# Patient Record
Sex: MALE | Race: WHITE | Employment: OTHER | URBAN - METROPOLITAN AREA
[De-identification: names, ages, dates, MRNs, and addresses within clinical notes are randomized per-mention and may not be internally consistent; named-entity substitution may affect disease eponyms.]

---

## 2018-05-23 ENCOUNTER — HOSPITAL ENCOUNTER (OUTPATIENT)
Dept: GENERAL RADIOLOGY | Age: 83
Discharge: HOME | End: 2018-05-25
Payer: MEDICARE

## 2018-05-23 ENCOUNTER — HOSPITAL ENCOUNTER (OUTPATIENT)
Age: 83
Discharge: HOME | End: 2018-05-25
Payer: MEDICARE

## 2018-05-23 DIAGNOSIS — M25.475: ICD-10-CM

## 2018-05-23 PROCEDURE — 73630 X-RAY EXAM OF FOOT: CPT

## 2018-06-07 ENCOUNTER — HOSPITAL ENCOUNTER (OUTPATIENT)
Dept: MRI IMAGING | Age: 83
Discharge: HOME | End: 2018-06-09
Payer: MEDICARE

## 2018-06-07 DIAGNOSIS — M25.475: ICD-10-CM

## 2018-06-07 DIAGNOSIS — M19.072 PRIMARY LOCALIZED OSTEOARTHRITIS OF LEFT ANKLE: ICD-10-CM

## 2018-06-07 PROCEDURE — 73721 MRI JNT OF LWR EXTRE W/O DYE: CPT

## 2021-01-01 ENCOUNTER — HOSPITAL ENCOUNTER (OUTPATIENT)
Dept: INFUSION THERAPY | Age: 86
Discharge: HOME | End: 2021-11-16
Payer: MEDICARE

## 2021-01-01 ENCOUNTER — HOSPITAL ENCOUNTER (OUTPATIENT)
Dept: INFUSION THERAPY | Age: 86
End: 2021-01-01

## 2021-01-01 ENCOUNTER — TELEPHONE (OUTPATIENT)
Dept: INFUSION THERAPY | Age: 86
End: 2021-01-01

## 2021-01-01 ENCOUNTER — APPOINTMENT (OUTPATIENT)
Dept: ULTRASOUND IMAGING | Age: 86
DRG: 291 | End: 2021-01-01
Payer: MEDICARE

## 2021-01-01 ENCOUNTER — OFFICE VISIT (OUTPATIENT)
Dept: CARDIOLOGY CLINIC | Age: 86
End: 2021-01-01
Payer: MEDICARE

## 2021-01-01 ENCOUNTER — OFFICE VISIT (OUTPATIENT)
Dept: ONCOLOGY | Age: 86
DRG: 543 | End: 2021-01-01
Payer: MEDICARE

## 2021-01-01 ENCOUNTER — TELEPHONE (OUTPATIENT)
Dept: PULMONOLOGY | Age: 86
End: 2021-01-01

## 2021-01-01 ENCOUNTER — HOSPITAL ENCOUNTER (OUTPATIENT)
Dept: RADIATION ONCOLOGY | Age: 86
Discharge: HOME | End: 2021-10-13
Attending: RADIOLOGY
Payer: MEDICARE

## 2021-01-01 ENCOUNTER — HOSPITAL ENCOUNTER (OUTPATIENT)
Dept: CT IMAGING | Age: 86
Discharge: HOME | End: 2021-07-03
Payer: MEDICARE

## 2021-01-01 ENCOUNTER — IMMUNIZATION (OUTPATIENT)
Dept: PRIMARY CARE CLINIC | Age: 86
End: 2021-01-01
Payer: MEDICARE

## 2021-01-01 ENCOUNTER — APPOINTMENT (OUTPATIENT)
Dept: CT IMAGING | Age: 86
DRG: 543 | End: 2021-01-01
Payer: MEDICARE

## 2021-01-01 ENCOUNTER — HOSPITAL ENCOUNTER (OUTPATIENT)
Dept: RADIATION ONCOLOGY | Age: 86
Discharge: HOME | End: 2021-12-10
Attending: RADIOLOGY
Payer: MEDICARE

## 2021-01-01 ENCOUNTER — HOSPITAL ENCOUNTER (OUTPATIENT)
Dept: RADIATION ONCOLOGY | Age: 86
Discharge: HOME | End: 2021-11-18
Attending: RADIOLOGY
Payer: MEDICARE

## 2021-01-01 ENCOUNTER — HOSPITAL ENCOUNTER (OUTPATIENT)
Dept: RADIATION ONCOLOGY | Age: 86
Discharge: HOME | End: 2021-11-16
Attending: RADIOLOGY
Payer: MEDICARE

## 2021-01-01 ENCOUNTER — HOSPITAL ENCOUNTER (OUTPATIENT)
Dept: INFUSION THERAPY | Age: 86
Discharge: HOME | End: 2021-12-15
Payer: MEDICARE

## 2021-01-01 ENCOUNTER — APPOINTMENT (OUTPATIENT)
Dept: RADIATION ONCOLOGY | Age: 86
End: 2021-01-01
Attending: RADIOLOGY
Payer: MEDICARE

## 2021-01-01 ENCOUNTER — TELEPHONE (OUTPATIENT)
Dept: CASE MANAGEMENT | Age: 86
End: 2021-01-01

## 2021-01-01 ENCOUNTER — ANESTHESIA (OUTPATIENT)
Dept: ENDOSCOPY | Age: 86
End: 2021-01-01
Payer: MEDICARE

## 2021-01-01 ENCOUNTER — HOSPITAL ENCOUNTER (OUTPATIENT)
Dept: RADIATION ONCOLOGY | Age: 86
Discharge: HOME | End: 2021-12-08
Attending: RADIOLOGY
Payer: MEDICARE

## 2021-01-01 ENCOUNTER — HOSPITAL ENCOUNTER (OUTPATIENT)
Dept: INFUSION THERAPY | Age: 86
Discharge: HOME | End: 2021-11-23

## 2021-01-01 ENCOUNTER — TELEPHONE (OUTPATIENT)
Dept: VASCULAR SURGERY | Age: 86
End: 2021-01-01

## 2021-01-01 ENCOUNTER — ANESTHESIA (OUTPATIENT)
Dept: INPATIENT UNIT | Age: 86
End: 2021-01-01

## 2021-01-01 ENCOUNTER — HOSPITAL ENCOUNTER (OUTPATIENT)
Dept: RADIATION ONCOLOGY | Age: 86
Discharge: HOME | End: 2021-11-10
Attending: RADIOLOGY
Payer: MEDICARE

## 2021-01-01 ENCOUNTER — CARE COORDINATION (OUTPATIENT)
Dept: CASE MANAGEMENT | Age: 86
End: 2021-01-01

## 2021-01-01 ENCOUNTER — HOSPITAL ENCOUNTER (OUTPATIENT)
Dept: NUCLEAR MEDICINE | Age: 86
Discharge: HOME | End: 2021-09-25
Payer: MEDICARE

## 2021-01-01 ENCOUNTER — OFFICE VISIT (OUTPATIENT)
Dept: ONCOLOGY | Age: 86
End: 2021-01-01
Payer: MEDICARE

## 2021-01-01 ENCOUNTER — APPOINTMENT (OUTPATIENT)
Dept: GENERAL RADIOLOGY | Age: 86
End: 2021-01-01
Payer: MEDICARE

## 2021-01-01 ENCOUNTER — APPOINTMENT (OUTPATIENT)
Dept: GENERAL RADIOLOGY | Age: 86
End: 2021-01-01
Attending: INTERNAL MEDICINE
Payer: MEDICARE

## 2021-01-01 ENCOUNTER — HOSPITAL ENCOUNTER (OUTPATIENT)
Dept: RADIATION ONCOLOGY | Age: 86
Discharge: HOME | End: 2021-12-02
Attending: RADIOLOGY
Payer: MEDICARE

## 2021-01-01 ENCOUNTER — HOSPITAL ENCOUNTER (OUTPATIENT)
Dept: RADIATION ONCOLOGY | Age: 86
Discharge: HOME | End: 2021-11-15
Attending: RADIOLOGY
Payer: MEDICARE

## 2021-01-01 ENCOUNTER — HOSPITAL ENCOUNTER (OUTPATIENT)
Dept: RADIATION ONCOLOGY | Age: 86
Discharge: HOME | End: 2021-11-30
Attending: RADIOLOGY
Payer: MEDICARE

## 2021-01-01 ENCOUNTER — HOSPITAL ENCOUNTER (OUTPATIENT)
Dept: RADIATION ONCOLOGY | Age: 86
Discharge: HOME | End: 2021-11-09
Attending: RADIOLOGY
Payer: MEDICARE

## 2021-01-01 ENCOUNTER — ANESTHESIA EVENT (OUTPATIENT)
Dept: INPATIENT UNIT | Age: 86
End: 2021-01-01

## 2021-01-01 ENCOUNTER — HOSPITAL ENCOUNTER (OUTPATIENT)
Dept: RADIATION ONCOLOGY | Age: 86
Discharge: HOME | End: 2021-11-11
Attending: RADIOLOGY
Payer: MEDICARE

## 2021-01-01 ENCOUNTER — HOSPITAL ENCOUNTER (OUTPATIENT)
Dept: INFUSION THERAPY | Age: 86
Discharge: HOME | End: 2021-11-09
Payer: MEDICARE

## 2021-01-01 ENCOUNTER — HOSPITAL ENCOUNTER (OUTPATIENT)
Dept: RADIATION ONCOLOGY | Age: 86
Discharge: HOME | End: 2021-11-19
Attending: RADIOLOGY
Payer: MEDICARE

## 2021-01-01 ENCOUNTER — HOSPITAL ENCOUNTER (OUTPATIENT)
Dept: RADIATION ONCOLOGY | Age: 86
Discharge: HOME | End: 2021-12-21
Attending: RADIOLOGY
Payer: MEDICARE

## 2021-01-01 ENCOUNTER — HOSPITAL ENCOUNTER (OUTPATIENT)
Dept: RADIATION ONCOLOGY | Age: 86
Discharge: HOME | End: 2021-12-14
Attending: RADIOLOGY
Payer: MEDICARE

## 2021-01-01 ENCOUNTER — HOSPITAL ENCOUNTER (OUTPATIENT)
Dept: INFUSION THERAPY | Age: 86
Discharge: HOME | End: 2021-12-13
Payer: MEDICARE

## 2021-01-01 ENCOUNTER — HOSPITAL ENCOUNTER (OUTPATIENT)
Dept: RADIATION ONCOLOGY | Age: 86
Discharge: HOME | End: 2021-11-12
Attending: RADIOLOGY
Payer: MEDICARE

## 2021-01-01 ENCOUNTER — ANESTHESIA EVENT (OUTPATIENT)
Dept: ENDOSCOPY | Age: 86
End: 2021-01-01
Payer: MEDICARE

## 2021-01-01 ENCOUNTER — HOSPITAL ENCOUNTER (OUTPATIENT)
Dept: PET IMAGING | Age: 86
Discharge: HOME | End: 2021-09-22
Payer: MEDICARE

## 2021-01-01 ENCOUNTER — HOSPITAL ENCOUNTER (OUTPATIENT)
Dept: RADIATION ONCOLOGY | Age: 86
Discharge: HOME | End: 2021-12-01
Attending: RADIOLOGY
Payer: MEDICARE

## 2021-01-01 ENCOUNTER — APPOINTMENT (OUTPATIENT)
Dept: NUCLEAR MEDICINE | Age: 86
DRG: 291 | End: 2021-01-01
Payer: MEDICARE

## 2021-01-01 ENCOUNTER — HOSPITAL ENCOUNTER (OUTPATIENT)
Dept: RADIATION ONCOLOGY | Age: 86
Discharge: HOME | End: 2021-12-09
Attending: RADIOLOGY
Payer: MEDICARE

## 2021-01-01 ENCOUNTER — HOSPITAL ENCOUNTER (EMERGENCY)
Age: 86
Discharge: HOME | End: 2021-11-20
Payer: MEDICARE

## 2021-01-01 ENCOUNTER — HOSPITAL ENCOUNTER (OUTPATIENT)
Dept: RADIATION ONCOLOGY | Age: 86
Discharge: HOME | End: 2021-11-22
Attending: RADIOLOGY
Payer: MEDICARE

## 2021-01-01 ENCOUNTER — HOSPITAL ENCOUNTER (OUTPATIENT)
Dept: INPATIENT UNIT | Age: 86
Setting detail: OUTPATIENT SURGERY
Discharge: HOME | End: 2021-07-27
Payer: MEDICARE

## 2021-01-01 ENCOUNTER — HOSPITAL ENCOUNTER (OUTPATIENT)
Dept: INFUSION THERAPY | Age: 86
Discharge: HOME | End: 2021-12-07
Payer: MEDICARE

## 2021-01-01 ENCOUNTER — APPOINTMENT (OUTPATIENT)
Dept: CT IMAGING | Age: 86
DRG: 291 | End: 2021-01-01
Payer: MEDICARE

## 2021-01-01 ENCOUNTER — HOSPITAL ENCOUNTER (OUTPATIENT)
Dept: INFUSION THERAPY | Age: 86
Discharge: HOME | End: 2021-11-15
Payer: MEDICARE

## 2021-01-01 ENCOUNTER — HOSPITAL ENCOUNTER (OUTPATIENT)
Dept: RADIATION ONCOLOGY | Age: 86
Discharge: HOME | End: 2021-11-17
Attending: RADIOLOGY
Payer: MEDICARE

## 2021-01-01 ENCOUNTER — HOSPITAL ENCOUNTER (OUTPATIENT)
Dept: INFUSION THERAPY | Age: 86
Discharge: HOME | End: 2021-12-14
Payer: MEDICARE

## 2021-01-01 ENCOUNTER — TELEPHONE (OUTPATIENT)
Dept: CARDIOLOGY CLINIC | Age: 86
End: 2021-01-01

## 2021-01-01 ENCOUNTER — HOSPITAL ENCOUNTER (OUTPATIENT)
Dept: RADIATION ONCOLOGY | Age: 86
Discharge: HOME | End: 2021-12-03
Attending: RADIOLOGY
Payer: MEDICARE

## 2021-01-01 ENCOUNTER — HOSPITAL ENCOUNTER (OUTPATIENT)
Dept: GENERAL RADIOLOGY | Age: 86
Discharge: HOME | End: 2021-07-03
Payer: MEDICARE

## 2021-01-01 ENCOUNTER — HOSPITAL ENCOUNTER (OUTPATIENT)
Dept: INFUSION THERAPY | Age: 86
Discharge: HOME | End: 2021-11-08
Payer: MEDICARE

## 2021-01-01 ENCOUNTER — TELEPHONE (OUTPATIENT)
Dept: ONCOLOGY | Age: 86
End: 2021-01-01

## 2021-01-01 ENCOUNTER — HOSPITAL ENCOUNTER (OUTPATIENT)
Dept: RADIATION ONCOLOGY | Age: 86
Discharge: HOME | End: 2021-12-13
Attending: RADIOLOGY
Payer: MEDICARE

## 2021-01-01 ENCOUNTER — APPOINTMENT (OUTPATIENT)
Dept: GENERAL RADIOLOGY | Age: 86
DRG: 543 | End: 2021-01-01
Payer: MEDICARE

## 2021-01-01 ENCOUNTER — HOSPITAL ENCOUNTER (OUTPATIENT)
Dept: RADIATION ONCOLOGY | Age: 86
Discharge: HOME | End: 2021-11-21
Attending: RADIOLOGY
Payer: MEDICARE

## 2021-01-01 ENCOUNTER — HOSPITAL ENCOUNTER (OUTPATIENT)
Dept: RADIATION ONCOLOGY | Age: 86
Discharge: HOME | End: 2021-12-16
Attending: RADIOLOGY
Payer: MEDICARE

## 2021-01-01 ENCOUNTER — HOSPITAL ENCOUNTER (OUTPATIENT)
Dept: RADIATION ONCOLOGY | Age: 86
Discharge: HOME | End: 2021-10-21
Attending: RADIOLOGY
Payer: MEDICARE

## 2021-01-01 ENCOUNTER — HOSPITAL ENCOUNTER (OUTPATIENT)
Age: 86
Setting detail: OUTPATIENT SURGERY
Discharge: HOME | End: 2021-09-01
Attending: INTERNAL MEDICINE | Admitting: INTERNAL MEDICINE
Payer: MEDICARE

## 2021-01-01 ENCOUNTER — HOSPITAL ENCOUNTER (OUTPATIENT)
Age: 86
Setting detail: OBSERVATION
Discharge: HOME | End: 2021-06-25
Attending: EMERGENCY MEDICINE | Admitting: INTERNAL MEDICINE
Payer: MEDICARE

## 2021-01-01 ENCOUNTER — HOSPITAL ENCOUNTER (OUTPATIENT)
Dept: INFUSION THERAPY | Age: 86
Discharge: HOME | End: 2021-12-17
Payer: MEDICARE

## 2021-01-01 ENCOUNTER — HOSPITAL ENCOUNTER (OUTPATIENT)
Dept: INFUSION THERAPY | Age: 86
Discharge: HOME | DRG: 543 | End: 2021-12-27
Payer: MEDICARE

## 2021-01-01 ENCOUNTER — HOSPITAL ENCOUNTER (OUTPATIENT)
Dept: MRI IMAGING | Age: 86
Discharge: HOME | End: 2021-09-23
Payer: MEDICARE

## 2021-01-01 ENCOUNTER — HOSPITAL ENCOUNTER (OUTPATIENT)
Dept: INFUSION THERAPY | Age: 86
End: 2021-01-01
Payer: MEDICARE

## 2021-01-01 ENCOUNTER — HOSPITAL ENCOUNTER (OUTPATIENT)
Dept: INFUSION THERAPY | Age: 86
Discharge: HOME | End: 2021-11-30
Payer: MEDICARE

## 2021-01-01 ENCOUNTER — HOSPITAL ENCOUNTER (OUTPATIENT)
Dept: INFUSION THERAPY | Age: 86
Discharge: HOME | End: 2021-09-28
Payer: MEDICARE

## 2021-01-01 ENCOUNTER — HOSPITAL ENCOUNTER (OUTPATIENT)
Dept: RADIATION ONCOLOGY | Age: 86
Discharge: HOME | End: 2021-12-17
Attending: RADIOLOGY
Payer: MEDICARE

## 2021-01-01 ENCOUNTER — HOSPITAL ENCOUNTER (OUTPATIENT)
Dept: RADIATION ONCOLOGY | Age: 86
Discharge: HOME | End: 2021-12-15
Attending: RADIOLOGY
Payer: MEDICARE

## 2021-01-01 ENCOUNTER — HOSPITAL ENCOUNTER (INPATIENT)
Age: 86
LOS: 7 days | Discharge: NURSING, REHAB OR LONG-TERM CARE | DRG: 543 | End: 2022-01-05
Attending: EMERGENCY MEDICINE | Admitting: INTERNAL MEDICINE
Payer: MEDICARE

## 2021-01-01 ENCOUNTER — TELEPHONE (OUTPATIENT)
Dept: PHARMACY | Age: 86
End: 2021-01-01

## 2021-01-01 ENCOUNTER — HOSPITAL ENCOUNTER (OUTPATIENT)
Dept: RADIATION ONCOLOGY | Age: 86
Discharge: HOME | End: 2021-11-08
Attending: RADIOLOGY
Payer: MEDICARE

## 2021-01-01 ENCOUNTER — HOSPITAL ENCOUNTER (OUTPATIENT)
Dept: ULTRASOUND IMAGING | Age: 86
Discharge: HOME | End: 2021-11-18
Payer: MEDICARE

## 2021-01-01 ENCOUNTER — HOSPITAL ENCOUNTER (OUTPATIENT)
Dept: INFUSION THERAPY | Age: 86
Discharge: HOME | End: 2021-10-15
Payer: MEDICARE

## 2021-01-01 ENCOUNTER — HOSPITAL ENCOUNTER (OUTPATIENT)
Dept: RADIATION ONCOLOGY | Age: 86
Discharge: HOME | End: 2021-12-07
Attending: RADIOLOGY
Payer: MEDICARE

## 2021-01-01 ENCOUNTER — HOSPITAL ENCOUNTER (OUTPATIENT)
Dept: RADIATION ONCOLOGY | Age: 86
Discharge: HOME | End: 2021-11-24
Attending: RADIOLOGY
Payer: MEDICARE

## 2021-01-01 ENCOUNTER — HOSPITAL ENCOUNTER (OUTPATIENT)
Dept: INFUSION THERAPY | Age: 86
Discharge: HOME | End: 2021-11-29
Payer: MEDICARE

## 2021-01-01 ENCOUNTER — HOSPITAL ENCOUNTER (OUTPATIENT)
Dept: INFUSION THERAPY | Age: 86
Discharge: HOME | DRG: 543 | End: 2021-12-28
Payer: MEDICARE

## 2021-01-01 ENCOUNTER — HOSPITAL ENCOUNTER (INPATIENT)
Age: 86
LOS: 3 days | Discharge: HOME | DRG: 291 | End: 2021-06-18
Attending: EMERGENCY MEDICINE | Admitting: INTERNAL MEDICINE
Payer: MEDICARE

## 2021-01-01 ENCOUNTER — HOSPITAL ENCOUNTER (OUTPATIENT)
Dept: INFUSION THERAPY | Age: 86
Discharge: HOME | End: 2021-12-06
Payer: MEDICARE

## 2021-01-01 ENCOUNTER — HOSPITAL ENCOUNTER (OUTPATIENT)
Dept: INFUSION THERAPY | Age: 86
Discharge: HOME | End: 2021-11-22
Payer: MEDICARE

## 2021-01-01 ENCOUNTER — HOSPITAL ENCOUNTER (OUTPATIENT)
Dept: RADIATION ONCOLOGY | Age: 86
Discharge: HOME | End: 2021-10-04
Payer: MEDICARE

## 2021-01-01 ENCOUNTER — HOSPITAL ENCOUNTER (OUTPATIENT)
Dept: RADIATION ONCOLOGY | Age: 86
Discharge: HOME | End: 2021-11-23
Attending: RADIOLOGY
Payer: MEDICARE

## 2021-01-01 ENCOUNTER — HOSPITAL ENCOUNTER (OUTPATIENT)
Dept: RADIATION ONCOLOGY | Age: 86
Discharge: HOME | End: 2021-12-20
Attending: RADIOLOGY
Payer: MEDICARE

## 2021-01-01 ENCOUNTER — HOSPITAL ENCOUNTER (OUTPATIENT)
Dept: INFUSION THERAPY | Age: 86
Discharge: HOME | End: 2021-12-16
Payer: MEDICARE

## 2021-01-01 ENCOUNTER — APPOINTMENT (OUTPATIENT)
Dept: GENERAL RADIOLOGY | Age: 86
DRG: 291 | End: 2021-01-01
Payer: MEDICARE

## 2021-01-01 ENCOUNTER — OFFICE VISIT (OUTPATIENT)
Dept: VASCULAR SURGERY | Age: 86
End: 2021-01-01
Payer: MEDICARE

## 2021-01-01 ENCOUNTER — HOSPITAL ENCOUNTER (OUTPATIENT)
Dept: CT IMAGING | Age: 86
Discharge: HOME | End: 2021-07-17
Payer: MEDICARE

## 2021-01-01 ENCOUNTER — HOSPITAL ENCOUNTER (OUTPATIENT)
Dept: RADIATION ONCOLOGY | Age: 86
Discharge: HOME | End: 2021-11-29
Attending: RADIOLOGY
Payer: MEDICARE

## 2021-01-01 ENCOUNTER — HOSPITAL ENCOUNTER (OUTPATIENT)
Dept: CT IMAGING | Age: 86
Discharge: HOME | End: 2021-08-26
Payer: MEDICARE

## 2021-01-01 ENCOUNTER — HOSPITAL ENCOUNTER (OUTPATIENT)
Dept: RADIATION ONCOLOGY | Age: 86
Discharge: HOME | End: 2021-12-06
Attending: RADIOLOGY
Payer: MEDICARE

## 2021-01-01 ENCOUNTER — APPOINTMENT (OUTPATIENT)
Dept: NON INVASIVE DIAGNOSTICS | Age: 86
DRG: 291 | End: 2021-01-01
Payer: MEDICARE

## 2021-01-01 VITALS
OXYGEN SATURATION: 99 % | SYSTOLIC BLOOD PRESSURE: 131 MMHG | HEART RATE: 76 BPM | TEMPERATURE: 97.4 F | DIASTOLIC BLOOD PRESSURE: 79 MMHG | RESPIRATION RATE: 16 BRPM

## 2021-01-01 VITALS
WEIGHT: 149.5 LBS | RESPIRATION RATE: 16 BRPM | TEMPERATURE: 97.1 F | DIASTOLIC BLOOD PRESSURE: 71 MMHG | OXYGEN SATURATION: 97 % | BODY MASS INDEX: 22.08 KG/M2 | HEART RATE: 85 BPM | SYSTOLIC BLOOD PRESSURE: 123 MMHG

## 2021-01-01 VITALS
HEART RATE: 74 BPM | DIASTOLIC BLOOD PRESSURE: 78 MMHG | WEIGHT: 159.5 LBS | OXYGEN SATURATION: 96 % | SYSTOLIC BLOOD PRESSURE: 120 MMHG | TEMPERATURE: 97.8 F | BODY MASS INDEX: 23.55 KG/M2 | RESPIRATION RATE: 18 BRPM

## 2021-01-01 VITALS
RESPIRATION RATE: 18 BRPM | TEMPERATURE: 97.1 F | DIASTOLIC BLOOD PRESSURE: 78 MMHG | WEIGHT: 150 LBS | OXYGEN SATURATION: 98 % | SYSTOLIC BLOOD PRESSURE: 122 MMHG | HEART RATE: 103 BPM | BODY MASS INDEX: 22.15 KG/M2

## 2021-01-01 VITALS
HEART RATE: 106 BPM | DIASTOLIC BLOOD PRESSURE: 74 MMHG | WEIGHT: 148.6 LBS | RESPIRATION RATE: 18 BRPM | SYSTOLIC BLOOD PRESSURE: 118 MMHG | TEMPERATURE: 97.1 F | BODY MASS INDEX: 21.94 KG/M2 | OXYGEN SATURATION: 93 %

## 2021-01-01 VITALS
SYSTOLIC BLOOD PRESSURE: 142 MMHG | BODY MASS INDEX: 21.03 KG/M2 | HEART RATE: 59 BPM | DIASTOLIC BLOOD PRESSURE: 78 MMHG | WEIGHT: 142 LBS | HEIGHT: 69 IN | RESPIRATION RATE: 18 BRPM

## 2021-01-01 VITALS — DIASTOLIC BLOOD PRESSURE: 72 MMHG | SYSTOLIC BLOOD PRESSURE: 122 MMHG

## 2021-01-01 VITALS
WEIGHT: 143.7 LBS | HEART RATE: 74 BPM | SYSTOLIC BLOOD PRESSURE: 124 MMHG | OXYGEN SATURATION: 94 % | TEMPERATURE: 97.1 F | BODY MASS INDEX: 21.22 KG/M2 | DIASTOLIC BLOOD PRESSURE: 84 MMHG | RESPIRATION RATE: 18 BRPM

## 2021-01-01 VITALS
TEMPERATURE: 97.6 F | RESPIRATION RATE: 18 BRPM | SYSTOLIC BLOOD PRESSURE: 154 MMHG | DIASTOLIC BLOOD PRESSURE: 92 MMHG | HEART RATE: 72 BPM

## 2021-01-01 VITALS
SYSTOLIC BLOOD PRESSURE: 131 MMHG | DIASTOLIC BLOOD PRESSURE: 92 MMHG | RESPIRATION RATE: 16 BRPM | DIASTOLIC BLOOD PRESSURE: 64 MMHG | OXYGEN SATURATION: 100 % | HEART RATE: 66 BPM | SYSTOLIC BLOOD PRESSURE: 176 MMHG

## 2021-01-01 VITALS
OXYGEN SATURATION: 97 % | TEMPERATURE: 97.8 F | SYSTOLIC BLOOD PRESSURE: 124 MMHG | RESPIRATION RATE: 18 BRPM | BODY MASS INDEX: 23.07 KG/M2 | HEART RATE: 88 BPM | DIASTOLIC BLOOD PRESSURE: 82 MMHG | WEIGHT: 156.2 LBS

## 2021-01-01 VITALS
RESPIRATION RATE: 20 BRPM | BODY MASS INDEX: 21.12 KG/M2 | WEIGHT: 143 LBS | DIASTOLIC BLOOD PRESSURE: 85 MMHG | TEMPERATURE: 98.4 F | HEART RATE: 62 BPM | OXYGEN SATURATION: 92 % | SYSTOLIC BLOOD PRESSURE: 136 MMHG

## 2021-01-01 VITALS
RESPIRATION RATE: 18 BRPM | TEMPERATURE: 97.8 F | SYSTOLIC BLOOD PRESSURE: 147 MMHG | DIASTOLIC BLOOD PRESSURE: 82 MMHG | HEART RATE: 72 BPM

## 2021-01-01 VITALS
OXYGEN SATURATION: 98 % | HEART RATE: 85 BPM | DIASTOLIC BLOOD PRESSURE: 67 MMHG | TEMPERATURE: 97.2 F | SYSTOLIC BLOOD PRESSURE: 114 MMHG | RESPIRATION RATE: 17 BRPM | BODY MASS INDEX: 21.53 KG/M2 | WEIGHT: 145.8 LBS

## 2021-01-01 VITALS
HEART RATE: 52 BPM | BODY MASS INDEX: 21.03 KG/M2 | WEIGHT: 142 LBS | OXYGEN SATURATION: 96 % | RESPIRATION RATE: 22 BRPM | TEMPERATURE: 97.4 F | DIASTOLIC BLOOD PRESSURE: 70 MMHG | HEIGHT: 69 IN | SYSTOLIC BLOOD PRESSURE: 140 MMHG

## 2021-01-01 VITALS
RESPIRATION RATE: 16 BRPM | DIASTOLIC BLOOD PRESSURE: 71 MMHG | HEART RATE: 111 BPM | HEIGHT: 69 IN | TEMPERATURE: 97 F | WEIGHT: 150 LBS | SYSTOLIC BLOOD PRESSURE: 119 MMHG | BODY MASS INDEX: 22.22 KG/M2 | OXYGEN SATURATION: 96 %

## 2021-01-01 VITALS
DIASTOLIC BLOOD PRESSURE: 70 MMHG | RESPIRATION RATE: 18 BRPM | BODY MASS INDEX: 22.31 KG/M2 | HEIGHT: 69 IN | WEIGHT: 150.6 LBS | SYSTOLIC BLOOD PRESSURE: 110 MMHG | HEART RATE: 70 BPM

## 2021-01-01 VITALS
SYSTOLIC BLOOD PRESSURE: 131 MMHG | HEART RATE: 82 BPM | WEIGHT: 149.2 LBS | DIASTOLIC BLOOD PRESSURE: 71 MMHG | HEIGHT: 69 IN | TEMPERATURE: 97.3 F | BODY MASS INDEX: 22.1 KG/M2 | OXYGEN SATURATION: 99 %

## 2021-01-01 VITALS
HEART RATE: 81 BPM | WEIGHT: 148.5 LBS | DIASTOLIC BLOOD PRESSURE: 71 MMHG | TEMPERATURE: 97.2 F | SYSTOLIC BLOOD PRESSURE: 133 MMHG | OXYGEN SATURATION: 96 % | BODY MASS INDEX: 22 KG/M2 | HEIGHT: 69 IN

## 2021-01-01 VITALS
SYSTOLIC BLOOD PRESSURE: 158 MMHG | DIASTOLIC BLOOD PRESSURE: 102 MMHG | OXYGEN SATURATION: 97 % | WEIGHT: 156 LBS | HEIGHT: 68 IN | BODY MASS INDEX: 23.64 KG/M2 | RESPIRATION RATE: 16 BRPM | HEART RATE: 112 BPM

## 2021-01-01 VITALS
DIASTOLIC BLOOD PRESSURE: 83 MMHG | HEIGHT: 69 IN | WEIGHT: 143.9 LBS | BODY MASS INDEX: 21.31 KG/M2 | RESPIRATION RATE: 16 BRPM | SYSTOLIC BLOOD PRESSURE: 155 MMHG | HEART RATE: 122 BPM

## 2021-01-01 VITALS
TEMPERATURE: 97.5 F | SYSTOLIC BLOOD PRESSURE: 151 MMHG | WEIGHT: 147.13 LBS | BODY MASS INDEX: 21.79 KG/M2 | HEART RATE: 71 BPM | OXYGEN SATURATION: 96 % | RESPIRATION RATE: 16 BRPM | DIASTOLIC BLOOD PRESSURE: 92 MMHG | HEIGHT: 69 IN

## 2021-01-01 VITALS
WEIGHT: 145 LBS | BODY MASS INDEX: 21.48 KG/M2 | RESPIRATION RATE: 14 BRPM | DIASTOLIC BLOOD PRESSURE: 72 MMHG | SYSTOLIC BLOOD PRESSURE: 142 MMHG | OXYGEN SATURATION: 96 % | HEIGHT: 69 IN | HEART RATE: 96 BPM | TEMPERATURE: 97 F

## 2021-01-01 VITALS
WEIGHT: 145 LBS | HEIGHT: 69 IN | BODY MASS INDEX: 21.48 KG/M2 | SYSTOLIC BLOOD PRESSURE: 182 MMHG | DIASTOLIC BLOOD PRESSURE: 86 MMHG | OXYGEN SATURATION: 99 % | RESPIRATION RATE: 14 BRPM | HEART RATE: 57 BPM

## 2021-01-01 VITALS
SYSTOLIC BLOOD PRESSURE: 115 MMHG | TEMPERATURE: 97.4 F | WEIGHT: 142 LBS | RESPIRATION RATE: 20 BRPM | DIASTOLIC BLOOD PRESSURE: 64 MMHG | HEIGHT: 69 IN | HEART RATE: 69 BPM | OXYGEN SATURATION: 96 % | BODY MASS INDEX: 21.03 KG/M2

## 2021-01-01 VITALS
BODY MASS INDEX: 23.31 KG/M2 | OXYGEN SATURATION: 95 % | TEMPERATURE: 97.2 F | WEIGHT: 157.4 LBS | HEIGHT: 69 IN | HEART RATE: 95 BPM | SYSTOLIC BLOOD PRESSURE: 155 MMHG | DIASTOLIC BLOOD PRESSURE: 89 MMHG

## 2021-01-01 VITALS
TEMPERATURE: 98 F | OXYGEN SATURATION: 97 % | HEIGHT: 69 IN | BODY MASS INDEX: 21.8 KG/M2 | HEART RATE: 94 BPM | SYSTOLIC BLOOD PRESSURE: 121 MMHG | DIASTOLIC BLOOD PRESSURE: 72 MMHG | WEIGHT: 147.2 LBS | RESPIRATION RATE: 16 BRPM

## 2021-01-01 VITALS
RESPIRATION RATE: 16 BRPM | DIASTOLIC BLOOD PRESSURE: 97 MMHG | WEIGHT: 159 LBS | SYSTOLIC BLOOD PRESSURE: 165 MMHG | HEART RATE: 93 BPM | OXYGEN SATURATION: 93 % | BODY MASS INDEX: 23.55 KG/M2 | HEIGHT: 69 IN | TEMPERATURE: 97.3 F

## 2021-01-01 VITALS — SYSTOLIC BLOOD PRESSURE: 120 MMHG | DIASTOLIC BLOOD PRESSURE: 64 MMHG | OXYGEN SATURATION: 100 %

## 2021-01-01 VITALS
SYSTOLIC BLOOD PRESSURE: 161 MMHG | RESPIRATION RATE: 16 BRPM | HEART RATE: 68 BPM | DIASTOLIC BLOOD PRESSURE: 93 MMHG | TEMPERATURE: 96.5 F

## 2021-01-01 VITALS
DIASTOLIC BLOOD PRESSURE: 68 MMHG | TEMPERATURE: 97.1 F | BODY MASS INDEX: 22.3 KG/M2 | OXYGEN SATURATION: 97 % | SYSTOLIC BLOOD PRESSURE: 148 MMHG | WEIGHT: 151 LBS | RESPIRATION RATE: 18 BRPM | HEART RATE: 87 BPM

## 2021-01-01 VITALS
HEART RATE: 87 BPM | DIASTOLIC BLOOD PRESSURE: 74 MMHG | SYSTOLIC BLOOD PRESSURE: 128 MMHG | TEMPERATURE: 97.1 F | WEIGHT: 148 LBS | BODY MASS INDEX: 21.86 KG/M2 | RESPIRATION RATE: 18 BRPM | OXYGEN SATURATION: 99 %

## 2021-01-01 VITALS
SYSTOLIC BLOOD PRESSURE: 156 MMHG | HEART RATE: 70 BPM | TEMPERATURE: 98.2 F | DIASTOLIC BLOOD PRESSURE: 97 MMHG | RESPIRATION RATE: 18 BRPM

## 2021-01-01 DIAGNOSIS — C34.91 NON-SMALL CELL CANCER OF RIGHT LUNG (HCC): Primary | ICD-10-CM

## 2021-01-01 DIAGNOSIS — N18.30 STAGE 3 CHRONIC KIDNEY DISEASE, UNSPECIFIED WHETHER STAGE 3A OR 3B CKD (HCC): ICD-10-CM

## 2021-01-01 DIAGNOSIS — I71.40 ABDOMINAL AORTIC ANEURYSM (AAA) WITHOUT RUPTURE: ICD-10-CM

## 2021-01-01 DIAGNOSIS — Z01.812 PRE-OPERATIVE LABORATORY EXAMINATION: Primary | ICD-10-CM

## 2021-01-01 DIAGNOSIS — J44.9 CHRONIC OBSTRUCTIVE PULMONARY DISEASE, UNSPECIFIED COPD TYPE (HCC): Chronic | ICD-10-CM

## 2021-01-01 DIAGNOSIS — C34.90 MALIGNANT NEOPLASM OF LUNG, UNSPECIFIED LATERALITY, UNSPECIFIED PART OF LUNG (HCC): Primary | ICD-10-CM

## 2021-01-01 DIAGNOSIS — J96.01 ACUTE RESPIRATORY FAILURE WITH HYPOXIA (HCC): ICD-10-CM

## 2021-01-01 DIAGNOSIS — R91.8 LUNG MASS: ICD-10-CM

## 2021-01-01 DIAGNOSIS — J41.1 MUCOPURULENT CHRONIC BRONCHITIS (HCC): Chronic | ICD-10-CM

## 2021-01-01 DIAGNOSIS — I10 ESSENTIAL HYPERTENSION: Primary | ICD-10-CM

## 2021-01-01 DIAGNOSIS — R06.02 SHORTNESS OF BREATH: ICD-10-CM

## 2021-01-01 DIAGNOSIS — R91.8 LUNG MASS: Primary | ICD-10-CM

## 2021-01-01 DIAGNOSIS — I48.91 ATRIAL FIBRILLATION, UNSPECIFIED TYPE (HCC): Chronic | ICD-10-CM

## 2021-01-01 DIAGNOSIS — C34.90 MALIGNANT NEOPLASM OF LUNG, UNSPECIFIED LATERALITY, UNSPECIFIED PART OF LUNG (HCC): ICD-10-CM

## 2021-01-01 DIAGNOSIS — R60.0 LOWER LEG EDEMA: ICD-10-CM

## 2021-01-01 DIAGNOSIS — C34.90 NON-SMALL CELL LUNG CANCER, UNSPECIFIED LATERALITY (HCC): ICD-10-CM

## 2021-01-01 DIAGNOSIS — C34.90 NON-SMALL CELL LUNG CANCER, UNSPECIFIED LATERALITY (HCC): Primary | ICD-10-CM

## 2021-01-01 DIAGNOSIS — I48.91 ATRIAL FIBRILLATION, UNSPECIFIED TYPE (HCC): Primary | ICD-10-CM

## 2021-01-01 DIAGNOSIS — R60.0 LOWER LEG EDEMA: Primary | ICD-10-CM

## 2021-01-01 DIAGNOSIS — I50.22 CHRONIC SYSTOLIC CONGESTIVE HEART FAILURE (HCC): ICD-10-CM

## 2021-01-01 DIAGNOSIS — I48.91 ATRIAL FIBRILLATION, UNSPECIFIED TYPE (HCC): ICD-10-CM

## 2021-01-01 DIAGNOSIS — E78.5 HYPERLIPIDEMIA LDL GOAL <100: ICD-10-CM

## 2021-01-01 DIAGNOSIS — E78.5 HYPERLIPIDEMIA LDL GOAL <100: Chronic | ICD-10-CM

## 2021-01-01 DIAGNOSIS — I10 ESSENTIAL HYPERTENSION: Chronic | ICD-10-CM

## 2021-01-01 DIAGNOSIS — R94.31 ABNORMAL EKG: ICD-10-CM

## 2021-01-01 DIAGNOSIS — C76.0 HEAD AND NECK CANCER (HCC): Primary | ICD-10-CM

## 2021-01-01 DIAGNOSIS — R06.09 DYSPNEA ON EXERTION: ICD-10-CM

## 2021-01-01 DIAGNOSIS — I50.9 CONGESTIVE HEART FAILURE, UNSPECIFIED HF CHRONICITY, UNSPECIFIED HEART FAILURE TYPE (HCC): ICD-10-CM

## 2021-01-01 DIAGNOSIS — C34.91 NON-SMALL CELL CANCER OF RIGHT LUNG (HCC): ICD-10-CM

## 2021-01-01 DIAGNOSIS — N17.9 ACUTE KIDNEY INJURY SUPERIMPOSED ON CKD (HCC): ICD-10-CM

## 2021-01-01 DIAGNOSIS — R91.8 OTHER NONSPECIFIC ABNORMAL FINDING OF LUNG FIELD: ICD-10-CM

## 2021-01-01 DIAGNOSIS — I71.40 AAA (ABDOMINAL AORTIC ANEURYSM) WITHOUT RUPTURE: Primary | ICD-10-CM

## 2021-01-01 DIAGNOSIS — I50.9 ACUTE DECOMPENSATED HEART FAILURE (HCC): Primary | ICD-10-CM

## 2021-01-01 DIAGNOSIS — I50.42 CHRONIC COMBINED SYSTOLIC AND DIASTOLIC CONGESTIVE HEART FAILURE (HCC): ICD-10-CM

## 2021-01-01 DIAGNOSIS — M48.56XA NONTRAUMATIC COMPRESSION FRACTURE OF L1 VERTEBRA, INITIAL ENCOUNTER (HCC): Primary | ICD-10-CM

## 2021-01-01 DIAGNOSIS — K59.00 CONSTIPATION, UNSPECIFIED CONSTIPATION TYPE: Primary | ICD-10-CM

## 2021-01-01 DIAGNOSIS — R06.09 DOE (DYSPNEA ON EXERTION): ICD-10-CM

## 2021-01-01 DIAGNOSIS — J44.1 COPD EXACERBATION (HCC): Primary | ICD-10-CM

## 2021-01-01 DIAGNOSIS — I50.30 DIASTOLIC CONGESTIVE HEART FAILURE, UNSPECIFIED HF CHRONICITY (HCC): ICD-10-CM

## 2021-01-01 DIAGNOSIS — I71.40 AAA (ABDOMINAL AORTIC ANEURYSM) WITHOUT RUPTURE: ICD-10-CM

## 2021-01-01 DIAGNOSIS — I10 ESSENTIAL HYPERTENSION: ICD-10-CM

## 2021-01-01 DIAGNOSIS — T14.8XXA ABRASION: ICD-10-CM

## 2021-01-01 DIAGNOSIS — N18.9 ACUTE KIDNEY INJURY SUPERIMPOSED ON CKD (HCC): ICD-10-CM

## 2021-01-01 LAB
AFB CULTURE (MYCOBACTERIA): NORMAL
AFB SMEAR: NORMAL
ALBUMIN SERPL-MCNC: 2.9 G/DL (ref 3.5–5.2)
ALBUMIN SERPL-MCNC: 3.4 G/DL (ref 3.5–5.2)
ALBUMIN SERPL-MCNC: 3.6 G/DL (ref 3.5–5.2)
ALBUMIN SERPL-MCNC: 3.6 G/DL (ref 3.5–5.2)
ALBUMIN SERPL-MCNC: 3.7 G/DL (ref 3.5–5.2)
ALBUMIN SERPL-MCNC: 3.7 G/DL (ref 3.5–5.2)
ALBUMIN SERPL-MCNC: 3.8 G/DL (ref 3.5–5.2)
ALBUMIN SERPL-MCNC: 3.9 G/DL (ref 3.5–5.2)
ALBUMIN SERPL-MCNC: 3.9 G/DL (ref 3.5–5.2)
ALP BLD-CCNC: 105 U/L (ref 40–129)
ALP BLD-CCNC: 81 U/L (ref 40–129)
ALP BLD-CCNC: 83 U/L (ref 40–129)
ALP BLD-CCNC: 84 U/L (ref 40–129)
ALP BLD-CCNC: 88 U/L (ref 40–129)
ALP BLD-CCNC: 88 U/L (ref 40–129)
ALP BLD-CCNC: 89 U/L (ref 40–129)
ALP BLD-CCNC: 90 U/L (ref 40–129)
ALP BLD-CCNC: 96 U/L (ref 40–129)
ALP BLD-CCNC: 97 U/L (ref 40–129)
ALP BLD-CCNC: 97 U/L (ref 40–129)
ALT SERPL-CCNC: 13 U/L (ref 0–40)
ALT SERPL-CCNC: 13 U/L (ref 0–40)
ALT SERPL-CCNC: 14 U/L (ref 0–40)
ALT SERPL-CCNC: 16 U/L (ref 0–40)
ALT SERPL-CCNC: 17 U/L (ref 0–40)
ALT SERPL-CCNC: 17 U/L (ref 0–40)
ALT SERPL-CCNC: 19 U/L (ref 0–40)
ALT SERPL-CCNC: 19 U/L (ref 0–40)
ALT SERPL-CCNC: 20 U/L (ref 0–40)
ALT SERPL-CCNC: 21 U/L (ref 0–40)
ALT SERPL-CCNC: 22 U/L (ref 0–40)
ANION GAP SERPL CALCULATED.3IONS-SCNC: 10 MMOL/L (ref 7–16)
ANION GAP SERPL CALCULATED.3IONS-SCNC: 11 MMOL/L (ref 7–16)
ANION GAP SERPL CALCULATED.3IONS-SCNC: 12 MMOL/L (ref 7–16)
ANION GAP SERPL CALCULATED.3IONS-SCNC: 13 MMOL/L (ref 7–16)
ANION GAP SERPL CALCULATED.3IONS-SCNC: 7 MMOL/L (ref 7–16)
ANION GAP SERPL CALCULATED.3IONS-SCNC: 8 MMOL/L (ref 7–16)
ANION GAP SERPL CALCULATED.3IONS-SCNC: 9 MMOL/L (ref 7–16)
ANISOCYTOSIS: ABNORMAL
APPEARANCE FLUID: NORMAL
APTT: 30.8 SEC (ref 24.5–35.1)
AST SERPL-CCNC: 16 U/L (ref 0–39)
AST SERPL-CCNC: 16 U/L (ref 0–39)
AST SERPL-CCNC: 17 U/L (ref 0–39)
AST SERPL-CCNC: 18 U/L (ref 0–39)
AST SERPL-CCNC: 21 U/L (ref 0–39)
AST SERPL-CCNC: 30 U/L (ref 0–39)
ATYPICAL LYMPHOCYTE RELATIVE PERCENT: 2 % (ref 0–4)
ATYPICAL LYMPHOCYTE RELATIVE PERCENT: 3.5 % (ref 0–4)
B.E.: 2 MMOL/L (ref -3–3)
BACTERIA: ABNORMAL /HPF
BASO FLUID: 0 %
BASOPHILS ABSOLUTE: 0 E9/L (ref 0–0.2)
BASOPHILS ABSOLUTE: 0 E9/L (ref 0–0.2)
BASOPHILS ABSOLUTE: 0.02 E9/L (ref 0–0.2)
BASOPHILS ABSOLUTE: 0.03 E9/L (ref 0–0.2)
BASOPHILS ABSOLUTE: 0.04 E9/L (ref 0–0.2)
BASOPHILS ABSOLUTE: 0.05 E9/L (ref 0–0.2)
BASOPHILS ABSOLUTE: 0.06 E9/L (ref 0–0.2)
BASOPHILS ABSOLUTE: 0.06 E9/L (ref 0–0.2)
BASOPHILS ABSOLUTE: 0.07 E9/L (ref 0–0.2)
BASOPHILS ABSOLUTE: 0.07 E9/L (ref 0–0.2)
BASOPHILS ABSOLUTE: 0.08 E9/L (ref 0–0.2)
BASOPHILS ABSOLUTE: 0.09 E9/L (ref 0–0.2)
BASOPHILS RELATIVE PERCENT: 0 % (ref 0–2)
BASOPHILS RELATIVE PERCENT: 0.7 % (ref 0–2)
BASOPHILS RELATIVE PERCENT: 0.7 % (ref 0–2)
BASOPHILS RELATIVE PERCENT: 0.8 % (ref 0–2)
BASOPHILS RELATIVE PERCENT: 0.8 % (ref 0–2)
BASOPHILS RELATIVE PERCENT: 0.9 % (ref 0–2)
BASOPHILS RELATIVE PERCENT: 0.9 % (ref 0–2)
BASOPHILS RELATIVE PERCENT: 1.1 % (ref 0–2)
BASOPHILS RELATIVE PERCENT: 1.6 % (ref 0–2)
BASOPHILS RELATIVE PERCENT: 1.6 % (ref 0–2)
BASOPHILS RELATIVE PERCENT: 1.7 % (ref 0–2)
BASOPHILS RELATIVE PERCENT: 1.8 % (ref 0–2)
BILIRUB SERPL-MCNC: 1 MG/DL (ref 0–1.2)
BILIRUB SERPL-MCNC: 1 MG/DL (ref 0–1.2)
BILIRUB SERPL-MCNC: 1.1 MG/DL (ref 0–1.2)
BILIRUB SERPL-MCNC: 1.2 MG/DL (ref 0–1.2)
BILIRUB SERPL-MCNC: 1.3 MG/DL (ref 0–1.2)
BILIRUB SERPL-MCNC: 1.3 MG/DL (ref 0–1.2)
BILIRUB SERPL-MCNC: 1.4 MG/DL (ref 0–1.2)
BILIRUB SERPL-MCNC: 1.4 MG/DL (ref 0–1.2)
BILIRUB SERPL-MCNC: 1.6 MG/DL (ref 0–1.2)
BILIRUBIN DIRECT: 0.6 MG/DL (ref 0–0.3)
BILIRUBIN URINE: NEGATIVE
BILIRUBIN URINE: NEGATIVE
BILIRUBIN, INDIRECT: 0.7 MG/DL (ref 0–1)
BLASTS RELATIVE PERCENT: 0.9 % (ref 0–0)
BLOOD, URINE: NEGATIVE
BLOOD, URINE: NORMAL
BUN BLDV-MCNC: 22 MG/DL (ref 6–23)
BUN BLDV-MCNC: 24 MG/DL (ref 6–23)
BUN BLDV-MCNC: 26 MG/DL (ref 6–23)
BUN BLDV-MCNC: 27 MG/DL (ref 6–23)
BUN BLDV-MCNC: 28 MG/DL (ref 6–23)
BUN BLDV-MCNC: 30 MG/DL (ref 6–23)
BUN BLDV-MCNC: 32 MG/DL (ref 6–23)
BUN BLDV-MCNC: 33 MG/DL (ref 6–23)
BUN BLDV-MCNC: 34 MG/DL (ref 6–23)
BUN BLDV-MCNC: 35 MG/DL (ref 6–23)
BUN BLDV-MCNC: 35 MG/DL (ref 6–23)
BUN BLDV-MCNC: 36 MG/DL (ref 6–23)
BUN BLDV-MCNC: 37 MG/DL (ref 6–23)
BUN BLDV-MCNC: 40 MG/DL (ref 6–23)
BUN BLDV-MCNC: 43 MG/DL (ref 6–23)
BUN BLDV-MCNC: 51 MG/DL (ref 6–23)
BURR CELLS: ABNORMAL
CALCIUM SERPL-MCNC: 8.4 MG/DL (ref 8.6–10.2)
CALCIUM SERPL-MCNC: 8.8 MG/DL (ref 8.6–10.2)
CALCIUM SERPL-MCNC: 8.9 MG/DL (ref 8.6–10.2)
CALCIUM SERPL-MCNC: 8.9 MG/DL (ref 8.6–10.2)
CALCIUM SERPL-MCNC: 9 MG/DL (ref 8.6–10.2)
CALCIUM SERPL-MCNC: 9.1 MG/DL (ref 8.6–10.2)
CALCIUM SERPL-MCNC: 9.2 MG/DL (ref 8.6–10.2)
CALCIUM SERPL-MCNC: 9.2 MG/DL (ref 8.6–10.2)
CALCIUM SERPL-MCNC: 9.3 MG/DL (ref 8.6–10.2)
CALCIUM SERPL-MCNC: 9.4 MG/DL (ref 8.6–10.2)
CALCIUM SERPL-MCNC: 9.4 MG/DL (ref 8.6–10.2)
CALCIUM SERPL-MCNC: 9.5 MG/DL (ref 8.6–10.2)
CALCIUM SERPL-MCNC: 9.5 MG/DL (ref 8.6–10.2)
CELL COUNT FLUID TYPE: NORMAL
CHLORIDE BLD-SCNC: 100 MMOL/L (ref 98–107)
CHLORIDE BLD-SCNC: 101 MMOL/L (ref 98–107)
CHLORIDE BLD-SCNC: 102 MMOL/L (ref 98–107)
CHLORIDE BLD-SCNC: 103 MMOL/L (ref 98–107)
CHLORIDE BLD-SCNC: 97 MMOL/L (ref 98–107)
CHLORIDE BLD-SCNC: 97 MMOL/L (ref 98–107)
CHLORIDE BLD-SCNC: 98 MMOL/L (ref 98–107)
CHLORIDE BLD-SCNC: 98 MMOL/L (ref 98–107)
CHLORIDE BLD-SCNC: 99 MMOL/L (ref 98–107)
CHLORIDE URINE RANDOM: 76 MMOL/L
CHOLESTEROL, TOTAL: 95 MG/DL (ref 0–199)
CLARITY: CLEAR
CLARITY: CLEAR
CO2: 25 MMOL/L (ref 22–29)
CO2: 26 MMOL/L (ref 22–29)
CO2: 27 MMOL/L (ref 22–29)
CO2: 28 MMOL/L (ref 22–29)
CO2: 29 MMOL/L (ref 22–29)
CO2: 29 MMOL/L (ref 22–29)
CO2: 30 MMOL/L (ref 22–29)
CO2: 30 MMOL/L (ref 22–29)
CO2: 31 MMOL/L (ref 22–29)
CO2: 32 MMOL/L (ref 22–29)
CO2: 32 MMOL/L (ref 22–29)
CO2: 34 MMOL/L (ref 22–29)
COHB: 0.5 % (ref 0–1.5)
COLOR FLUID: NORMAL
COLOR: YELLOW
COLOR: YELLOW
CREAT SERPL-MCNC: 1.6 MG/DL (ref 0.7–1.2)
CREAT SERPL-MCNC: 1.6 MG/DL (ref 0.7–1.2)
CREAT SERPL-MCNC: 1.7 MG/DL (ref 0.7–1.2)
CREAT SERPL-MCNC: 1.8 MG/DL (ref 0.7–1.2)
CREAT SERPL-MCNC: 1.9 MG/DL (ref 0.7–1.2)
CREAT SERPL-MCNC: 2 MG/DL (ref 0.7–1.2)
CREATININE URINE: 79 MG/DL (ref 40–278)
CRITICAL: ABNORMAL
CULTURE, RESPIRATORY: NORMAL
DATE ANALYZED: ABNORMAL
DATE OF COLLECTION: ABNORMAL
EKG ATRIAL RATE: 125 BPM
EKG ATRIAL RATE: 150 BPM
EKG ATRIAL RATE: 153 BPM
EKG ATRIAL RATE: 92 BPM
EKG P AXIS: 77 DEGREES
EKG P-R INTERVAL: 168 MS
EKG Q-T INTERVAL: 300 MS
EKG Q-T INTERVAL: 328 MS
EKG Q-T INTERVAL: 370 MS
EKG Q-T INTERVAL: 438 MS
EKG QRS DURATION: 100 MS
EKG QRS DURATION: 104 MS
EKG QRS DURATION: 92 MS
EKG QRS DURATION: 94 MS
EKG QTC CALCULATION (BAZETT): 424 MS
EKG QTC CALCULATION (BAZETT): 437 MS
EKG QTC CALCULATION (BAZETT): 452 MS
EKG QTC CALCULATION (BAZETT): 459 MS
EKG R AXIS: 20 DEGREES
EKG R AXIS: 23 DEGREES
EKG R AXIS: 29 DEGREES
EKG R AXIS: 37 DEGREES
EKG T AXIS: -120 DEGREES
EKG T AXIS: 17 DEGREES
EKG T AXIS: 22 DEGREES
EKG T AXIS: 4 DEGREES
EKG VENTRICULAR RATE: 107 BPM
EKG VENTRICULAR RATE: 120 BPM
EKG VENTRICULAR RATE: 66 BPM
EKG VENTRICULAR RATE: 90 BPM
EOSINOPHIL FLUID: 0 %
EOSINOPHILS ABSOLUTE: 0 E9/L (ref 0.05–0.5)
EOSINOPHILS ABSOLUTE: 0.01 E9/L (ref 0.05–0.5)
EOSINOPHILS ABSOLUTE: 0.01 E9/L (ref 0.05–0.5)
EOSINOPHILS ABSOLUTE: 0.03 E9/L (ref 0.05–0.5)
EOSINOPHILS ABSOLUTE: 0.05 E9/L (ref 0.05–0.5)
EOSINOPHILS ABSOLUTE: 0.05 E9/L (ref 0.05–0.5)
EOSINOPHILS ABSOLUTE: 0.06 E9/L (ref 0.05–0.5)
EOSINOPHILS ABSOLUTE: 0.07 E9/L (ref 0.05–0.5)
EOSINOPHILS ABSOLUTE: 0.08 E9/L (ref 0.05–0.5)
EOSINOPHILS ABSOLUTE: 0.09 E9/L (ref 0.05–0.5)
EOSINOPHILS ABSOLUTE: 0.1 E9/L (ref 0.05–0.5)
EOSINOPHILS ABSOLUTE: 0.11 E9/L (ref 0.05–0.5)
EOSINOPHILS RELATIVE PERCENT: 0 % (ref 0–6)
EOSINOPHILS RELATIVE PERCENT: 0.3 % (ref 0–6)
EOSINOPHILS RELATIVE PERCENT: 0.3 % (ref 0–6)
EOSINOPHILS RELATIVE PERCENT: 0.6 % (ref 0–6)
EOSINOPHILS RELATIVE PERCENT: 0.7 % (ref 0–6)
EOSINOPHILS RELATIVE PERCENT: 0.8 % (ref 0–6)
EOSINOPHILS RELATIVE PERCENT: 0.9 % (ref 0–6)
EOSINOPHILS RELATIVE PERCENT: 0.9 % (ref 0–6)
EOSINOPHILS RELATIVE PERCENT: 1 % (ref 0–6)
EOSINOPHILS RELATIVE PERCENT: 1.6 % (ref 0–6)
EOSINOPHILS RELATIVE PERCENT: 1.8 % (ref 0–6)
EOSINOPHILS RELATIVE PERCENT: 2.7 % (ref 0–6)
FUNGUS (MYCOLOGY) CULTURE: NORMAL
FUNGUS STAIN: NORMAL
GFR AFRICAN AMERICAN: 39
GFR AFRICAN AMERICAN: 41
GFR AFRICAN AMERICAN: 44
GFR AFRICAN AMERICAN: 47
GFR AFRICAN AMERICAN: 50
GFR AFRICAN AMERICAN: 50
GFR NON-AFRICAN AMERICAN: 32 ML/MIN/1.73
GFR NON-AFRICAN AMERICAN: 34 ML/MIN/1.73
GFR NON-AFRICAN AMERICAN: 36 ML/MIN/1.73
GFR NON-AFRICAN AMERICAN: 38 ML/MIN/1.73
GFR NON-AFRICAN AMERICAN: 38 ML/MIN/1.73
GFR NON-AFRICAN AMERICAN: 39 ML/MIN/1.73
GFR NON-AFRICAN AMERICAN: 41 ML/MIN/1.73
GFR NON-AFRICAN AMERICAN: 41 ML/MIN/1.73
GLUCOSE BLD-MCNC: 101 MG/DL (ref 74–99)
GLUCOSE BLD-MCNC: 103 MG/DL (ref 74–99)
GLUCOSE BLD-MCNC: 104 MG/DL (ref 74–99)
GLUCOSE BLD-MCNC: 106 MG/DL (ref 74–99)
GLUCOSE BLD-MCNC: 108 MG/DL (ref 74–99)
GLUCOSE BLD-MCNC: 109 MG/DL (ref 74–99)
GLUCOSE BLD-MCNC: 111 MG/DL (ref 74–99)
GLUCOSE BLD-MCNC: 111 MG/DL (ref 74–99)
GLUCOSE BLD-MCNC: 117 MG/DL (ref 74–99)
GLUCOSE BLD-MCNC: 119 MG/DL (ref 74–99)
GLUCOSE BLD-MCNC: 124 MG/DL (ref 74–99)
GLUCOSE BLD-MCNC: 129 MG/DL (ref 74–99)
GLUCOSE BLD-MCNC: 141 MG/DL (ref 74–99)
GLUCOSE BLD-MCNC: 91 MG/DL (ref 74–99)
GLUCOSE BLD-MCNC: 98 MG/DL (ref 74–99)
GLUCOSE BLD-MCNC: 98 MG/DL (ref 74–99)
GLUCOSE URINE: NEGATIVE MG/DL
GLUCOSE URINE: NEGATIVE MG/DL
GRAM STAIN ORDERABLE: NORMAL
HCO3: 27.5 MMOL/L (ref 22–26)
HCT VFR BLD CALC: 36.2 % (ref 37–54)
HCT VFR BLD CALC: 37.5 % (ref 37–54)
HCT VFR BLD CALC: 38 % (ref 37–54)
HCT VFR BLD CALC: 38.9 % (ref 37–54)
HCT VFR BLD CALC: 39.4 % (ref 37–54)
HCT VFR BLD CALC: 39.8 % (ref 37–54)
HCT VFR BLD CALC: 40.3 % (ref 37–54)
HCT VFR BLD CALC: 42.6 % (ref 37–54)
HCT VFR BLD CALC: 43.5 % (ref 37–54)
HCT VFR BLD CALC: 45.4 % (ref 37–54)
HCT VFR BLD CALC: 45.5 % (ref 37–54)
HCT VFR BLD CALC: 50.1 % (ref 37–54)
HDLC SERPL-MCNC: 38 MG/DL
HEMOGLOBIN: 12.1 G/DL (ref 12.5–16.5)
HEMOGLOBIN: 12.4 G/DL (ref 12.5–16.5)
HEMOGLOBIN: 12.5 G/DL (ref 12.5–16.5)
HEMOGLOBIN: 12.7 G/DL (ref 12.5–16.5)
HEMOGLOBIN: 13.1 G/DL (ref 12.5–16.5)
HEMOGLOBIN: 13.4 G/DL (ref 12.5–16.5)
HEMOGLOBIN: 13.5 G/DL (ref 12.5–16.5)
HEMOGLOBIN: 14.4 G/DL (ref 12.5–16.5)
HEMOGLOBIN: 14.5 G/DL (ref 12.5–16.5)
HEMOGLOBIN: 14.7 G/DL (ref 12.5–16.5)
HEMOGLOBIN: 14.8 G/DL (ref 12.5–16.5)
HEMOGLOBIN: 16 G/DL (ref 12.5–16.5)
HHB: 1 % (ref 0–5)
IMMATURE GRANULOCYTES #: 0.06 E9/L
IMMATURE GRANULOCYTES #: 0.06 E9/L
IMMATURE GRANULOCYTES #: 0.07 E9/L
IMMATURE GRANULOCYTES #: 0.09 E9/L
IMMATURE GRANULOCYTES #: 0.11 E9/L
IMMATURE GRANULOCYTES #: 0.13 E9/L
IMMATURE GRANULOCYTES %: 0.7 % (ref 0–5)
IMMATURE GRANULOCYTES %: 0.7 % (ref 0–5)
IMMATURE GRANULOCYTES %: 1.1 % (ref 0–5)
IMMATURE GRANULOCYTES %: 1.1 % (ref 0–5)
IMMATURE GRANULOCYTES %: 1.2 % (ref 0–5)
IMMATURE GRANULOCYTES %: 3.5 % (ref 0–5)
INR BLD: 1.1
INR BLD: 2.2
KETONES, URINE: NEGATIVE MG/DL
KETONES, URINE: NEGATIVE MG/DL
LAB: ABNORMAL
LDL CHOLESTEROL CALCULATED: 43 MG/DL (ref 0–99)
LEUKOCYTE ESTERASE, URINE: NEGATIVE
LEUKOCYTE ESTERASE, URINE: NEGATIVE
LV EF: 43 %
LV EF: 50 %
LV EF: 66 %
LVEF MODALITY: NORMAL
LYMPHOCYTES ABSOLUTE: 0.16 E9/L (ref 1.5–4)
LYMPHOCYTES ABSOLUTE: 0.29 E9/L (ref 1.5–4)
LYMPHOCYTES ABSOLUTE: 0.34 E9/L (ref 1.5–4)
LYMPHOCYTES ABSOLUTE: 0.43 E9/L (ref 1.5–4)
LYMPHOCYTES ABSOLUTE: 0.47 E9/L (ref 1.5–4)
LYMPHOCYTES ABSOLUTE: 0.64 E9/L (ref 1.5–4)
LYMPHOCYTES ABSOLUTE: 0.67 E9/L (ref 1.5–4)
LYMPHOCYTES ABSOLUTE: 0.71 E9/L (ref 1.5–4)
LYMPHOCYTES ABSOLUTE: 0.89 E9/L (ref 1.5–4)
LYMPHOCYTES ABSOLUTE: 1.26 E9/L (ref 1.5–4)
LYMPHOCYTES ABSOLUTE: 1.32 E9/L (ref 1.5–4)
LYMPHOCYTES ABSOLUTE: 1.41 E9/L (ref 1.5–4)
LYMPHOCYTES RELATIVE PERCENT: 10.3 % (ref 20–42)
LYMPHOCYTES RELATIVE PERCENT: 11.7 % (ref 20–42)
LYMPHOCYTES RELATIVE PERCENT: 12.8 % (ref 20–42)
LYMPHOCYTES RELATIVE PERCENT: 14.7 % (ref 20–42)
LYMPHOCYTES RELATIVE PERCENT: 16.3 % (ref 20–42)
LYMPHOCYTES RELATIVE PERCENT: 17.9 % (ref 20–42)
LYMPHOCYTES RELATIVE PERCENT: 2.6 % (ref 20–42)
LYMPHOCYTES RELATIVE PERCENT: 2.6 % (ref 20–42)
LYMPHOCYTES RELATIVE PERCENT: 22.9 % (ref 20–42)
LYMPHOCYTES RELATIVE PERCENT: 4 % (ref 20–42)
LYMPHOCYTES RELATIVE PERCENT: 9.2 % (ref 20–42)
LYMPHOCYTES RELATIVE PERCENT: 9.6 % (ref 20–42)
LYMPHOCYTES, BODY FLUID: 85 %
Lab: ABNORMAL
MAGNESIUM: 2.1 MG/DL (ref 1.6–2.6)
MAGNESIUM: 2.1 MG/DL (ref 1.6–2.6)
MCH RBC QN AUTO: 30.8 PG (ref 26–35)
MCH RBC QN AUTO: 30.9 PG (ref 26–35)
MCH RBC QN AUTO: 31.1 PG (ref 26–35)
MCH RBC QN AUTO: 31.2 PG (ref 26–35)
MCH RBC QN AUTO: 31.3 PG (ref 26–35)
MCH RBC QN AUTO: 31.5 PG (ref 26–35)
MCH RBC QN AUTO: 31.5 PG (ref 26–35)
MCH RBC QN AUTO: 31.7 PG (ref 26–35)
MCH RBC QN AUTO: 31.9 PG (ref 26–35)
MCH RBC QN AUTO: 32 PG (ref 26–35)
MCH RBC QN AUTO: 32 PG (ref 26–35)
MCH RBC QN AUTO: 33.6 PG (ref 26–35)
MCHC RBC AUTO-ENTMCNC: 31.9 % (ref 32–34.5)
MCHC RBC AUTO-ENTMCNC: 32.2 % (ref 32–34.5)
MCHC RBC AUTO-ENTMCNC: 32.3 % (ref 32–34.5)
MCHC RBC AUTO-ENTMCNC: 32.6 % (ref 32–34.5)
MCHC RBC AUTO-ENTMCNC: 32.9 % (ref 32–34.5)
MCHC RBC AUTO-ENTMCNC: 33.1 % (ref 32–34.5)
MCHC RBC AUTO-ENTMCNC: 33.1 % (ref 32–34.5)
MCHC RBC AUTO-ENTMCNC: 33.4 % (ref 32–34.5)
MCHC RBC AUTO-ENTMCNC: 33.5 % (ref 32–34.5)
MCHC RBC AUTO-ENTMCNC: 33.7 % (ref 32–34.5)
MCHC RBC AUTO-ENTMCNC: 33.7 % (ref 32–34.5)
MCHC RBC AUTO-ENTMCNC: 34 % (ref 32–34.5)
MCV RBC AUTO: 94.2 FL (ref 80–99.9)
MCV RBC AUTO: 94.3 FL (ref 80–99.9)
MCV RBC AUTO: 94.5 FL (ref 80–99.9)
MCV RBC AUTO: 94.6 FL (ref 80–99.9)
MCV RBC AUTO: 95 FL (ref 80–99.9)
MCV RBC AUTO: 95.1 FL (ref 80–99.9)
MCV RBC AUTO: 95.6 FL (ref 80–99.9)
MCV RBC AUTO: 96.2 FL (ref 80–99.9)
MCV RBC AUTO: 96.4 FL (ref 80–99.9)
MCV RBC AUTO: 97 FL (ref 80–99.9)
MCV RBC AUTO: 97.3 FL (ref 80–99.9)
MCV RBC AUTO: 98.6 FL (ref 80–99.9)
METAMYELOCYTES RELATIVE PERCENT: 0.9 % (ref 0–1)
METAMYELOCYTES RELATIVE PERCENT: 4 % (ref 0–1)
METAMYELOCYTES RELATIVE PERCENT: 6.1 % (ref 0–1)
METER GLUCOSE: 89 MG/DL (ref 74–99)
METHB: 0.5 % (ref 0–1.5)
MODE: ABNORMAL
MONOCYTE, FLUID: 4 %
MONOCYTES ABSOLUTE: 0.05 E9/L (ref 0.1–0.95)
MONOCYTES ABSOLUTE: 0.14 E9/L (ref 0.1–0.95)
MONOCYTES ABSOLUTE: 0.19 E9/L (ref 0.1–0.95)
MONOCYTES ABSOLUTE: 0.26 E9/L (ref 0.1–0.95)
MONOCYTES ABSOLUTE: 0.64 E9/L (ref 0.1–0.95)
MONOCYTES ABSOLUTE: 0.85 E9/L (ref 0.1–0.95)
MONOCYTES ABSOLUTE: 1.04 E9/L (ref 0.1–0.95)
MONOCYTES ABSOLUTE: 1.07 E9/L (ref 0.1–0.95)
MONOCYTES ABSOLUTE: 1.07 E9/L (ref 0.1–0.95)
MONOCYTES ABSOLUTE: 1.3 E9/L (ref 0.1–0.95)
MONOCYTES ABSOLUTE: 1.37 E9/L (ref 0.1–0.95)
MONOCYTES ABSOLUTE: 1.44 E9/L (ref 0.1–0.95)
MONOCYTES RELATIVE PERCENT: 12.1 % (ref 2–12)
MONOCYTES RELATIVE PERCENT: 12.5 % (ref 2–12)
MONOCYTES RELATIVE PERCENT: 12.6 % (ref 2–12)
MONOCYTES RELATIVE PERCENT: 13.2 % (ref 2–12)
MONOCYTES RELATIVE PERCENT: 13.6 % (ref 2–12)
MONOCYTES RELATIVE PERCENT: 14 % (ref 2–12)
MONOCYTES RELATIVE PERCENT: 2.6 % (ref 2–12)
MONOCYTES RELATIVE PERCENT: 29.4 % (ref 2–12)
MONOCYTES RELATIVE PERCENT: 4.3 % (ref 2–12)
MONOCYTES RELATIVE PERCENT: 5.2 % (ref 2–12)
MONOCYTES RELATIVE PERCENT: 6 % (ref 2–12)
MONOCYTES RELATIVE PERCENT: 7.4 % (ref 2–12)
MYELOCYTE PERCENT: 0.9 % (ref 0–0)
MYELOCYTE PERCENT: 2.6 % (ref 0–0)
MYELOCYTE PERCENT: 5.3 % (ref 0–0)
NEUTROPHIL, FLUID: 11 %
NEUTROPHILS ABSOLUTE: 1.23 E9/L (ref 1.8–7.3)
NEUTROPHILS ABSOLUTE: 1.34 E9/L (ref 1.8–7.3)
NEUTROPHILS ABSOLUTE: 1.37 E9/L (ref 1.8–7.3)
NEUTROPHILS ABSOLUTE: 2.95 E9/L (ref 1.8–7.3)
NEUTROPHILS ABSOLUTE: 4.9 E9/L (ref 1.8–7.3)
NEUTROPHILS ABSOLUTE: 5.17 E9/L (ref 1.8–7.3)
NEUTROPHILS ABSOLUTE: 6.04 E9/L (ref 1.8–7.3)
NEUTROPHILS ABSOLUTE: 6.51 E9/L (ref 1.8–7.3)
NEUTROPHILS ABSOLUTE: 7.46 E9/L (ref 1.8–7.3)
NEUTROPHILS ABSOLUTE: 8.04 E9/L (ref 1.8–7.3)
NEUTROPHILS ABSOLUTE: 8.88 E9/L (ref 1.8–7.3)
NEUTROPHILS ABSOLUTE: 9.22 E9/L (ref 1.8–7.3)
NEUTROPHILS RELATIVE PERCENT: 47.5 % (ref 43–80)
NEUTROPHILS RELATIVE PERCENT: 65.4 % (ref 43–80)
NEUTROPHILS RELATIVE PERCENT: 65.7 % (ref 43–80)
NEUTROPHILS RELATIVE PERCENT: 70.4 % (ref 43–80)
NEUTROPHILS RELATIVE PERCENT: 71.1 % (ref 43–80)
NEUTROPHILS RELATIVE PERCENT: 72 % (ref 43–80)
NEUTROPHILS RELATIVE PERCENT: 75.4 % (ref 43–80)
NEUTROPHILS RELATIVE PERCENT: 75.6 % (ref 43–80)
NEUTROPHILS RELATIVE PERCENT: 80.2 % (ref 43–80)
NEUTROPHILS RELATIVE PERCENT: 80.5 % (ref 43–80)
NEUTROPHILS RELATIVE PERCENT: 83 % (ref 43–80)
NEUTROPHILS RELATIVE PERCENT: 83.3 % (ref 43–80)
NITRITE, URINE: NEGATIVE
NITRITE, URINE: NEGATIVE
NUCLEATED CELLS FLUID: <3 /UL
NUCLEATED RED BLOOD CELLS: 2.6 /100 WBC
NUCLEATED RED BLOOD CELLS: 2.6 /100 WBC
O2 CONTENT: 21.8 ML/DL
O2 SATURATION: 99 % (ref 92–98.5)
O2HB: 98 % (ref 94–97)
OPERATOR ID: 1023
OSMOLALITY URINE: 399 MOSM/KG (ref 300–900)
OVALOCYTES: ABNORMAL
PATIENT TEMP: 37 C
PCO2: 46.1 MMHG (ref 35–45)
PDW BLD-RTO: 13.4 FL (ref 11.5–15)
PDW BLD-RTO: 13.8 FL (ref 11.5–15)
PDW BLD-RTO: 14 FL (ref 11.5–15)
PDW BLD-RTO: 14.4 FL (ref 11.5–15)
PDW BLD-RTO: 14.5 FL (ref 11.5–15)
PDW BLD-RTO: 14.6 FL (ref 11.5–15)
PDW BLD-RTO: 14.8 FL (ref 11.5–15)
PDW BLD-RTO: 14.9 FL (ref 11.5–15)
PDW BLD-RTO: 15.3 FL (ref 11.5–15)
PDW BLD-RTO: 18.5 FL (ref 11.5–15)
PDW BLD-RTO: 18.7 FL (ref 11.5–15)
PDW BLD-RTO: 18.7 FL (ref 11.5–15)
PH BLOOD GAS: 7.39 (ref 7.35–7.45)
PH UA: 5.5 (ref 5–9)
PH UA: 6 (ref 5–9)
PLATELET # BLD: 108 E9/L (ref 130–450)
PLATELET # BLD: 116 E9/L (ref 130–450)
PLATELET # BLD: 147 E9/L (ref 130–450)
PLATELET # BLD: 156 E9/L (ref 130–450)
PLATELET # BLD: 165 E9/L (ref 130–450)
PLATELET # BLD: 186 E9/L (ref 130–450)
PLATELET # BLD: 197 E9/L (ref 130–450)
PLATELET # BLD: 198 E9/L (ref 130–450)
PLATELET # BLD: 202 E9/L (ref 130–450)
PLATELET # BLD: 207 E9/L (ref 130–450)
PLATELET # BLD: 213 E9/L (ref 130–450)
PLATELET # BLD: 224 E9/L (ref 130–450)
PMV BLD AUTO: 10.2 FL (ref 7–12)
PMV BLD AUTO: 10.3 FL (ref 7–12)
PMV BLD AUTO: 10.6 FL (ref 7–12)
PMV BLD AUTO: 10.7 FL (ref 7–12)
PMV BLD AUTO: 10.7 FL (ref 7–12)
PMV BLD AUTO: 10.8 FL (ref 7–12)
PMV BLD AUTO: 11.2 FL (ref 7–12)
PMV BLD AUTO: 11.5 FL (ref 7–12)
PMV BLD AUTO: 11.5 FL (ref 7–12)
PMV BLD AUTO: 11.6 FL (ref 7–12)
PMV BLD AUTO: 9.9 FL (ref 7–12)
PMV BLD AUTO: 9.9 FL (ref 7–12)
PO2: 180.4 MMHG (ref 75–100)
POIKILOCYTES: ABNORMAL
POLYCHROMASIA: ABNORMAL
POTASSIUM REFLEX MAGNESIUM: 4.4 MMOL/L (ref 3.5–5)
POTASSIUM REFLEX MAGNESIUM: 4.6 MMOL/L (ref 3.5–5)
POTASSIUM REFLEX MAGNESIUM: 4.7 MMOL/L (ref 3.5–5)
POTASSIUM REFLEX MAGNESIUM: 5 MMOL/L (ref 3.5–5)
POTASSIUM REFLEX MAGNESIUM: 5.2 MMOL/L (ref 3.5–5)
POTASSIUM SERPL-SCNC: 4 MMOL/L (ref 3.5–5)
POTASSIUM SERPL-SCNC: 4.1 MMOL/L (ref 3.5–5)
POTASSIUM SERPL-SCNC: 4.1 MMOL/L (ref 3.5–5)
POTASSIUM SERPL-SCNC: 4.2 MMOL/L (ref 3.5–5)
POTASSIUM SERPL-SCNC: 4.3 MMOL/L (ref 3.5–5)
POTASSIUM SERPL-SCNC: 4.3 MMOL/L (ref 3.5–5)
POTASSIUM SERPL-SCNC: 4.5 MMOL/L (ref 3.5–5)
POTASSIUM SERPL-SCNC: 4.5 MMOL/L (ref 3.5–5)
POTASSIUM SERPL-SCNC: 4.6 MMOL/L (ref 3.5–5)
POTASSIUM SERPL-SCNC: 4.7 MMOL/L (ref 3.5–5)
POTASSIUM SERPL-SCNC: 5 MMOL/L (ref 3.5–5)
POTASSIUM, UR: 37 MMOL/L
PRO-BNP: ABNORMAL PG/ML (ref 0–450)
PROTEIN UA: NEGATIVE MG/DL
PROTEIN UA: NEGATIVE MG/DL
PROTHROMBIN TIME: 12.3 SEC (ref 9.3–12.4)
PROTHROMBIN TIME: 24.2 SEC (ref 9.3–12.4)
RBC # BLD: 3.84 E12/L (ref 3.8–5.8)
RBC # BLD: 3.94 E12/L (ref 3.8–5.8)
RBC # BLD: 3.97 E12/L (ref 3.8–5.8)
RBC # BLD: 4.06 E12/L (ref 3.8–5.8)
RBC # BLD: 4.09 E12/L (ref 3.8–5.8)
RBC # BLD: 4.19 E12/L (ref 3.8–5.8)
RBC # BLD: 4.28 E12/L (ref 3.8–5.8)
RBC # BLD: 4.32 E12/L (ref 3.8–5.8)
RBC # BLD: 4.52 E12/L (ref 3.8–5.8)
RBC # BLD: 4.76 E12/L (ref 3.8–5.8)
RBC # BLD: 4.8 E12/L (ref 3.8–5.8)
RBC # BLD: 5.15 E12/L (ref 3.8–5.8)
RBC FLUID: <2000 /UL
RBC UA: ABNORMAL /HPF (ref 0–2)
SARS-COV-2, NAAT: NOT DETECTED
SMEAR, RESPIRATORY: NORMAL
SODIUM BLD-SCNC: 137 MMOL/L (ref 132–146)
SODIUM BLD-SCNC: 138 MMOL/L (ref 132–146)
SODIUM BLD-SCNC: 138 MMOL/L (ref 132–146)
SODIUM BLD-SCNC: 139 MMOL/L (ref 132–146)
SODIUM BLD-SCNC: 140 MMOL/L (ref 132–146)
SODIUM BLD-SCNC: 141 MMOL/L (ref 132–146)
SODIUM BLD-SCNC: 142 MMOL/L (ref 132–146)
SODIUM BLD-SCNC: 144 MMOL/L (ref 132–146)
SODIUM URINE: 77 MMOL/L
SOURCE, BLOOD GAS: ABNORMAL
SPECIFIC GRAVITY UA: 1.01 (ref 1–1.03)
SPECIFIC GRAVITY UA: 1.02 (ref 1–1.03)
T4 FREE: 1.31 NG/DL (ref 0.93–1.7)
TARGET CELLS: ABNORMAL
TEAR DROP CELLS: ABNORMAL
THB: 15.6 G/DL (ref 11.5–16.5)
TIME ANALYZED: 818
TOTAL PROTEIN: 5.1 G/DL (ref 6.4–8.3)
TOTAL PROTEIN: 5.7 G/DL (ref 6.4–8.3)
TOTAL PROTEIN: 5.8 G/DL (ref 6.4–8.3)
TOTAL PROTEIN: 5.9 G/DL (ref 6.4–8.3)
TOTAL PROTEIN: 5.9 G/DL (ref 6.4–8.3)
TOTAL PROTEIN: 6 G/DL (ref 6.4–8.3)
TOTAL PROTEIN: 6 G/DL (ref 6.4–8.3)
TOTAL PROTEIN: 6.1 G/DL (ref 6.4–8.3)
TOTAL PROTEIN: 6.2 G/DL (ref 6.4–8.3)
TOTAL PROTEIN: 6.4 G/DL (ref 6.4–8.3)
TOTAL PROTEIN: 6.6 G/DL (ref 6.4–8.3)
TRIGL SERPL-MCNC: 68 MG/DL (ref 0–149)
TROPONIN, HIGH SENSITIVITY: 27 NG/L (ref 0–11)
TROPONIN, HIGH SENSITIVITY: 29 NG/L (ref 0–11)
TROPONIN, HIGH SENSITIVITY: 30 NG/L (ref 0–11)
TROPONIN, HIGH SENSITIVITY: 30 NG/L (ref 0–11)
TROPONIN, HIGH SENSITIVITY: 32 NG/L (ref 0–11)
TROPONIN, HIGH SENSITIVITY: 32 NG/L (ref 0–11)
TROPONIN, HIGH SENSITIVITY: 37 NG/L (ref 0–11)
TSH SERPL DL<=0.05 MIU/L-ACNC: 3.13 UIU/ML (ref 0.27–4.2)
UREA NITROGEN, UR: 452 MG/DL (ref 800–1666)
UROBILINOGEN, URINE: 0.2 E.U./DL
UROBILINOGEN, URINE: 0.2 E.U./DL
VLDLC SERPL CALC-MCNC: 14 MG/DL
WBC # BLD: 1.6 E9/L (ref 4.5–11.5)
WBC # BLD: 1.9 E9/L (ref 4.5–11.5)
WBC # BLD: 10.3 E9/L (ref 4.5–11.5)
WBC # BLD: 10.6 E9/L (ref 4.5–11.5)
WBC # BLD: 11.1 E9/L (ref 4.5–11.5)
WBC # BLD: 2.9 E9/L (ref 4.5–11.5)
WBC # BLD: 3.7 E9/L (ref 4.5–11.5)
WBC # BLD: 6.1 E9/L (ref 4.5–11.5)
WBC # BLD: 7.9 E9/L (ref 4.5–11.5)
WBC # BLD: 8.6 E9/L (ref 4.5–11.5)
WBC # BLD: 8.6 E9/L (ref 4.5–11.5)
WBC # BLD: 9.8 E9/L (ref 4.5–11.5)
WBC UA: ABNORMAL /HPF (ref 0–5)

## 2021-01-01 PROCEDURE — 36415 COLL VENOUS BLD VENIPUNCTURE: CPT

## 2021-01-01 PROCEDURE — 2500000003 HC RX 250 WO HCPCS: Performed by: EMERGENCY MEDICINE

## 2021-01-01 PROCEDURE — 99213 OFFICE O/P EST LOW 20 MIN: CPT

## 2021-01-01 PROCEDURE — 99214 OFFICE O/P EST MOD 30 MIN: CPT | Performed by: INTERNAL MEDICINE

## 2021-01-01 PROCEDURE — 77427 RADIATION TX MANAGEMENT X5: CPT | Performed by: RADIOLOGY

## 2021-01-01 PROCEDURE — 1036F TOBACCO NON-USER: CPT | Performed by: INTERNAL MEDICINE

## 2021-01-01 PROCEDURE — 2700000000 HC OXYGEN THERAPY PER DAY

## 2021-01-01 PROCEDURE — 99283 EMERGENCY DEPT VISIT LOW MDM: CPT

## 2021-01-01 PROCEDURE — 87015 SPECIMEN INFECT AGNT CONCNTJ: CPT

## 2021-01-01 PROCEDURE — 7100000001 HC PACU RECOVERY - ADDTL 15 MIN: Performed by: INTERNAL MEDICINE

## 2021-01-01 PROCEDURE — 77386 HC NTSTY MODUL RAD TX DLVR CPLX: CPT | Performed by: RADIOLOGY

## 2021-01-01 PROCEDURE — 2060000000 HC ICU INTERMEDIATE R&B

## 2021-01-01 PROCEDURE — 3609011900 HC BRONCHOSCOPY NEEDLE BX TRACHEA MAIN STEM&/BRON: Performed by: INTERNAL MEDICINE

## 2021-01-01 PROCEDURE — 93005 ELECTROCARDIOGRAM TRACING: CPT | Performed by: INTERNAL MEDICINE

## 2021-01-01 PROCEDURE — 80048 BASIC METABOLIC PNL TOTAL CA: CPT

## 2021-01-01 PROCEDURE — 94664 DEMO&/EVAL PT USE INHALER: CPT

## 2021-01-01 PROCEDURE — 92960 CARDIOVERSION ELECTRIC EXT: CPT

## 2021-01-01 PROCEDURE — 4040F PNEUMOC VAC/ADMIN/RCVD: CPT | Performed by: INTERNAL MEDICINE

## 2021-01-01 PROCEDURE — G0378 HOSPITAL OBSERVATION PER HR: HCPCS

## 2021-01-01 PROCEDURE — G8484 FLU IMMUNIZE NO ADMIN: HCPCS | Performed by: INTERNAL MEDICINE

## 2021-01-01 PROCEDURE — 6360000002 HC RX W HCPCS

## 2021-01-01 PROCEDURE — 84484 ASSAY OF TROPONIN QUANT: CPT

## 2021-01-01 PROCEDURE — 96374 THER/PROPH/DIAG INJ IV PUSH: CPT

## 2021-01-01 PROCEDURE — G8427 DOCREV CUR MEDS BY ELIG CLIN: HCPCS | Performed by: INTERNAL MEDICINE

## 2021-01-01 PROCEDURE — 93005 ELECTROCARDIOGRAM TRACING: CPT | Performed by: PHYSICIAN ASSISTANT

## 2021-01-01 PROCEDURE — 6360000002 HC RX W HCPCS: Performed by: INTERNAL MEDICINE

## 2021-01-01 PROCEDURE — 96413 CHEMO IV INFUSION 1 HR: CPT

## 2021-01-01 PROCEDURE — 6370000000 HC RX 637 (ALT 250 FOR IP): Performed by: STUDENT IN AN ORGANIZED HEALTH CARE EDUCATION/TRAINING PROGRAM

## 2021-01-01 PROCEDURE — 85025 COMPLETE CBC W/AUTO DIFF WBC: CPT

## 2021-01-01 PROCEDURE — 77014 PR CT GUIDANCE PLACEMENT RAD THERAPY FIELDS: CPT | Performed by: RADIOLOGY

## 2021-01-01 PROCEDURE — 6370000000 HC RX 637 (ALT 250 FOR IP): Performed by: FAMILY MEDICINE

## 2021-01-01 PROCEDURE — 99223 1ST HOSP IP/OBS HIGH 75: CPT | Performed by: INTERNAL MEDICINE

## 2021-01-01 PROCEDURE — 77300 RADIATION THERAPY DOSE PLAN: CPT | Performed by: RADIOLOGY

## 2021-01-01 PROCEDURE — 99215 OFFICE O/P EST HI 40 MIN: CPT | Performed by: INTERNAL MEDICINE

## 2021-01-01 PROCEDURE — 2580000003 HC RX 258: Performed by: FAMILY MEDICINE

## 2021-01-01 PROCEDURE — G8420 CALC BMI NORM PARAMETERS: HCPCS | Performed by: INTERNAL MEDICINE

## 2021-01-01 PROCEDURE — 77301 RADIOTHERAPY DOSE PLAN IMRT: CPT | Performed by: RADIOLOGY

## 2021-01-01 PROCEDURE — 80053 COMPREHEN METABOLIC PANEL: CPT

## 2021-01-01 PROCEDURE — 1123F ACP DISCUSS/DSCN MKR DOCD: CPT | Performed by: INTERNAL MEDICINE

## 2021-01-01 PROCEDURE — 82962 GLUCOSE BLOOD TEST: CPT

## 2021-01-01 PROCEDURE — 99233 SBSQ HOSP IP/OBS HIGH 50: CPT | Performed by: INTERNAL MEDICINE

## 2021-01-01 PROCEDURE — 99203 OFFICE O/P NEW LOW 30 MIN: CPT | Performed by: INTERNAL MEDICINE

## 2021-01-01 PROCEDURE — 77336 RADIATION PHYSICS CONSULT: CPT | Performed by: RADIOLOGY

## 2021-01-01 PROCEDURE — 31652 BRONCH EBUS SAMPLNG 1/2 NODE: CPT | Performed by: INTERNAL MEDICINE

## 2021-01-01 PROCEDURE — 2709999900 HC NON-CHARGEABLE SUPPLY: Performed by: INTERNAL MEDICINE

## 2021-01-01 PROCEDURE — G8926 SPIRO NO PERF OR DOC: HCPCS | Performed by: INTERNAL MEDICINE

## 2021-01-01 PROCEDURE — 6360000002 HC RX W HCPCS: Performed by: NURSE ANESTHETIST, CERTIFIED REGISTERED

## 2021-01-01 PROCEDURE — 3700000001 HC ADD 15 MINUTES (ANESTHESIA)

## 2021-01-01 PROCEDURE — 97161 PT EVAL LOW COMPLEX 20 MIN: CPT

## 2021-01-01 PROCEDURE — 31627 NAVIGATIONAL BRONCHOSCOPY: CPT | Performed by: INTERNAL MEDICINE

## 2021-01-01 PROCEDURE — 78815 PET IMAGE W/CT SKULL-THIGH: CPT

## 2021-01-01 PROCEDURE — 96372 THER/PROPH/DIAG INJ SC/IM: CPT

## 2021-01-01 PROCEDURE — 96375 TX/PRO/DX INJ NEW DRUG ADDON: CPT

## 2021-01-01 PROCEDURE — 72100 X-RAY EXAM L-S SPINE 2/3 VWS: CPT

## 2021-01-01 PROCEDURE — 93018 CV STRESS TEST I&R ONLY: CPT | Performed by: INTERNAL MEDICINE

## 2021-01-01 PROCEDURE — 87205 SMEAR GRAM STAIN: CPT

## 2021-01-01 PROCEDURE — 88112 CYTOPATH CELL ENHANCE TECH: CPT

## 2021-01-01 PROCEDURE — 84300 ASSAY OF URINE SODIUM: CPT

## 2021-01-01 PROCEDURE — 2500000003 HC RX 250 WO HCPCS: Performed by: INTERNAL MEDICINE

## 2021-01-01 PROCEDURE — 82805 BLOOD GASES W/O2 SATURATION: CPT

## 2021-01-01 PROCEDURE — 99204 OFFICE O/P NEW MOD 45 MIN: CPT | Performed by: SURGERY

## 2021-01-01 PROCEDURE — 72131 CT LUMBAR SPINE W/O DYE: CPT

## 2021-01-01 PROCEDURE — 93000 ELECTROCARDIOGRAM COMPLETE: CPT | Performed by: INTERNAL MEDICINE

## 2021-01-01 PROCEDURE — 2580000003 HC RX 258: Performed by: INTERNAL MEDICINE

## 2021-01-01 PROCEDURE — 3430000000 HC RX DIAGNOSTIC RADIOPHARMACEUTICAL: Performed by: RADIOLOGY

## 2021-01-01 PROCEDURE — 84540 ASSAY OF URINE/UREA-N: CPT

## 2021-01-01 PROCEDURE — 76775 US EXAM ABDO BACK WALL LIM: CPT | Performed by: RADIOLOGY

## 2021-01-01 PROCEDURE — 2709999900 CT NEEDLE BIOPSY LUNG PERCUTANEOUS W IMAGING GUIDANCE

## 2021-01-01 PROCEDURE — 3609155400 HC ADD ON COMPUTER ASSISTED NAVIGATION: Performed by: INTERNAL MEDICINE

## 2021-01-01 PROCEDURE — 96417 CHEMO IV INFUS EACH ADDL SEQ: CPT

## 2021-01-01 PROCEDURE — 32408 CORE NDL BX LNG/MED PERQ: CPT | Performed by: RADIOLOGY

## 2021-01-01 PROCEDURE — 77338 DESIGN MLC DEVICE FOR IMRT: CPT | Performed by: RADIOLOGY

## 2021-01-01 PROCEDURE — 1123F ACP DISCUSS/DSCN MKR DOCD: CPT | Performed by: SURGERY

## 2021-01-01 PROCEDURE — 7100000011 HC PHASE II RECOVERY - ADDTL 15 MIN: Performed by: INTERNAL MEDICINE

## 2021-01-01 PROCEDURE — 93312 ECHO TRANSESOPHAGEAL: CPT

## 2021-01-01 PROCEDURE — 83880 ASSAY OF NATRIURETIC PEPTIDE: CPT

## 2021-01-01 PROCEDURE — 87116 MYCOBACTERIA CULTURE: CPT

## 2021-01-01 PROCEDURE — 83735 ASSAY OF MAGNESIUM: CPT

## 2021-01-01 PROCEDURE — 31629 BRONCHOSCOPY/NEEDLE BX EACH: CPT | Performed by: INTERNAL MEDICINE

## 2021-01-01 PROCEDURE — 87635 SARS-COV-2 COVID-19 AMP PRB: CPT

## 2021-01-01 PROCEDURE — 3609020000 HC BRONCHOSCOPY W/EBUS FNA: Performed by: INTERNAL MEDICINE

## 2021-01-01 PROCEDURE — 99232 SBSQ HOSP IP/OBS MODERATE 35: CPT | Performed by: INTERNAL MEDICINE

## 2021-01-01 PROCEDURE — C1725 CATH, TRANSLUMIN NON-LASER: HCPCS | Performed by: INTERNAL MEDICINE

## 2021-01-01 PROCEDURE — 88173 CYTOPATH EVAL FNA REPORT: CPT

## 2021-01-01 PROCEDURE — 78306 BONE IMAGING WHOLE BODY: CPT | Performed by: RADIOLOGY

## 2021-01-01 PROCEDURE — 80061 LIPID PANEL: CPT

## 2021-01-01 PROCEDURE — 71045 X-RAY EXAM CHEST 1 VIEW: CPT

## 2021-01-01 PROCEDURE — 3700000001 HC ADD 15 MINUTES (ANESTHESIA): Performed by: INTERNAL MEDICINE

## 2021-01-01 PROCEDURE — 6370000000 HC RX 637 (ALT 250 FOR IP): Performed by: NURSE PRACTITIONER

## 2021-01-01 PROCEDURE — 3700000000 HC ANESTHESIA ATTENDED CARE: Performed by: INTERNAL MEDICINE

## 2021-01-01 PROCEDURE — 77334 RADIATION TREATMENT AID(S): CPT | Performed by: RADIOLOGY

## 2021-01-01 PROCEDURE — 99284 EMERGENCY DEPT VISIT MOD MDM: CPT

## 2021-01-01 PROCEDURE — 71250 CT THORAX DX C-: CPT

## 2021-01-01 PROCEDURE — 93325 DOPPLER ECHO COLOR FLOW MAPG: CPT | Performed by: INTERNAL MEDICINE

## 2021-01-01 PROCEDURE — 93970 EXTREMITY STUDY: CPT

## 2021-01-01 PROCEDURE — 99999 PR OFFICE/OUTPT VISIT,PROCEDURE ONLY: CPT | Performed by: RADIOLOGY

## 2021-01-01 PROCEDURE — 99222 1ST HOSP IP/OBS MODERATE 55: CPT | Performed by: NEUROLOGICAL SURGERY

## 2021-01-01 PROCEDURE — 87070 CULTURE OTHR SPECIMN AEROBIC: CPT

## 2021-01-01 PROCEDURE — 78452 HT MUSCLE IMAGE SPECT MULT: CPT

## 2021-01-01 PROCEDURE — 2500000003 HC RX 250 WO HCPCS: Performed by: NURSE ANESTHETIST, CERTIFIED REGISTERED

## 2021-01-01 PROCEDURE — 94640 AIRWAY INHALATION TREATMENT: CPT

## 2021-01-01 PROCEDURE — 6370000000 HC RX 637 (ALT 250 FOR IP): Performed by: INTERNAL MEDICINE

## 2021-01-01 PROCEDURE — 89051 BODY FLUID CELL COUNT: CPT

## 2021-01-01 PROCEDURE — 78306 BONE IMAGING WHOLE BODY: CPT

## 2021-01-01 PROCEDURE — 2140000000 HC CCU INTERMEDIATE R&B

## 2021-01-01 PROCEDURE — 2580000003 HC RX 258: Performed by: NURSE PRACTITIONER

## 2021-01-01 PROCEDURE — 7100000010 HC PHASE II RECOVERY - FIRST 15 MIN: Performed by: INTERNAL MEDICINE

## 2021-01-01 PROCEDURE — 6360000002 HC RX W HCPCS: Performed by: STUDENT IN AN ORGANIZED HEALTH CARE EDUCATION/TRAINING PROGRAM

## 2021-01-01 PROCEDURE — 81001 URINALYSIS AUTO W/SCOPE: CPT

## 2021-01-01 PROCEDURE — G8427 DOCREV CUR MEDS BY ELIG CLIN: HCPCS | Performed by: SURGERY

## 2021-01-01 PROCEDURE — 99205 OFFICE O/P NEW HI 60 MIN: CPT | Performed by: RADIOLOGY

## 2021-01-01 PROCEDURE — 4040F PNEUMOC VAC/ADMIN/RCVD: CPT | Performed by: SURGERY

## 2021-01-01 PROCEDURE — 3700000000 HC ANESTHESIA ATTENDED CARE

## 2021-01-01 PROCEDURE — 96376 TX/PRO/DX INJ SAME DRUG ADON: CPT

## 2021-01-01 PROCEDURE — 85610 PROTHROMBIN TIME: CPT

## 2021-01-01 PROCEDURE — 92960 CARDIOVERSION ELECTRIC EXT: CPT | Performed by: INTERNAL MEDICINE

## 2021-01-01 PROCEDURE — 99205 OFFICE O/P NEW HI 60 MIN: CPT | Performed by: INTERNAL MEDICINE

## 2021-01-01 PROCEDURE — 2580000003 HC RX 258: Performed by: NURSE ANESTHETIST, CERTIFIED REGISTERED

## 2021-01-01 PROCEDURE — 93312 ECHO TRANSESOPHAGEAL: CPT | Performed by: INTERNAL MEDICINE

## 2021-01-01 PROCEDURE — 84439 ASSAY OF FREE THYROXINE: CPT

## 2021-01-01 PROCEDURE — 88305 TISSUE EXAM BY PATHOLOGIST: CPT

## 2021-01-01 PROCEDURE — 91300 COVID-19, PFIZER VACCINE 30MCG/0.3ML DOSE: CPT | Performed by: NURSE PRACTITIONER

## 2021-01-01 PROCEDURE — 3209999900 FLUORO FOR SURGICAL PROCEDURES

## 2021-01-01 PROCEDURE — 80076 HEPATIC FUNCTION PANEL: CPT

## 2021-01-01 PROCEDURE — A9500 TC99M SESTAMIBI: HCPCS | Performed by: RADIOLOGY

## 2021-01-01 PROCEDURE — 82570 ASSAY OF URINE CREATININE: CPT

## 2021-01-01 PROCEDURE — 6360000002 HC RX W HCPCS: Performed by: FAMILY MEDICINE

## 2021-01-01 PROCEDURE — 1111F DSCHRG MED/CURRENT MED MERGE: CPT | Performed by: INTERNAL MEDICINE

## 2021-01-01 PROCEDURE — 99212 OFFICE O/P EST SF 10 MIN: CPT

## 2021-01-01 PROCEDURE — G8420 CALC BMI NORM PARAMETERS: HCPCS | Performed by: SURGERY

## 2021-01-01 PROCEDURE — 0002A COVID-19, PFIZER VACCINE 30MCG/0.3ML DOSE: CPT | Performed by: NURSE PRACTITIONER

## 2021-01-01 PROCEDURE — A9552 F18 FDG: HCPCS | Performed by: RADIOLOGY

## 2021-01-01 PROCEDURE — 6360000002 HC RX W HCPCS: Performed by: EMERGENCY MEDICINE

## 2021-01-01 PROCEDURE — 3023F SPIROM DOC REV: CPT | Performed by: INTERNAL MEDICINE

## 2021-01-01 PROCEDURE — 77263 THER RADIOLOGY TX PLNG CPLX: CPT | Performed by: RADIOLOGY

## 2021-01-01 PROCEDURE — 93320 DOPPLER ECHO COMPLETE: CPT

## 2021-01-01 PROCEDURE — 81003 URINALYSIS AUTO W/O SCOPE: CPT

## 2021-01-01 PROCEDURE — 71046 X-RAY EXAM CHEST 2 VIEWS: CPT

## 2021-01-01 PROCEDURE — 85730 THROMBOPLASTIN TIME PARTIAL: CPT

## 2021-01-01 PROCEDURE — 6360000002 HC RX W HCPCS: Performed by: NURSE PRACTITIONER

## 2021-01-01 PROCEDURE — 71045 X-RAY EXAM CHEST 1 VIEW: CPT | Performed by: RADIOLOGY

## 2021-01-01 PROCEDURE — 83935 ASSAY OF URINE OSMOLALITY: CPT

## 2021-01-01 PROCEDURE — 1111F DSCHRG MED/CURRENT MED MERGE: CPT | Performed by: SURGERY

## 2021-01-01 PROCEDURE — 97165 OT EVAL LOW COMPLEX 30 MIN: CPT

## 2021-01-01 PROCEDURE — 93306 TTE W/DOPPLER COMPLETE: CPT

## 2021-01-01 PROCEDURE — 1036F TOBACCO NON-USER: CPT | Performed by: SURGERY

## 2021-01-01 PROCEDURE — 7100000000 HC PACU RECOVERY - FIRST 15 MIN: Performed by: INTERNAL MEDICINE

## 2021-01-01 PROCEDURE — 93325 DOPPLER ECHO COLOR FLOW MAPG: CPT

## 2021-01-01 PROCEDURE — 84443 ASSAY THYROID STIM HORMONE: CPT

## 2021-01-01 PROCEDURE — 7100000010 HC PHASE II RECOVERY - FIRST 15 MIN

## 2021-01-01 PROCEDURE — 93005 ELECTROCARDIOGRAM TRACING: CPT

## 2021-01-01 PROCEDURE — 2720000010 HC SURG SUPPLY STERILE: Performed by: INTERNAL MEDICINE

## 2021-01-01 PROCEDURE — 70551 MRI BRAIN STEM W/O DYE: CPT

## 2021-01-01 PROCEDURE — A9503 TC99M MEDRONATE: HCPCS | Performed by: RADIOLOGY

## 2021-01-01 PROCEDURE — 87102 FUNGUS ISOLATION CULTURE: CPT

## 2021-01-01 PROCEDURE — 76775 US EXAM ABDO BACK WALL LIM: CPT

## 2021-01-01 PROCEDURE — 3609010800 HC BRONCHOSCOPY ALVEOLAR LAVAGE: Performed by: INTERNAL MEDICINE

## 2021-01-01 PROCEDURE — 2500000003 HC RX 250 WO HCPCS: Performed by: FAMILY MEDICINE

## 2021-01-01 PROCEDURE — 93005 ELECTROCARDIOGRAM TRACING: CPT | Performed by: STUDENT IN AN ORGANIZED HEALTH CARE EDUCATION/TRAINING PROGRAM

## 2021-01-01 PROCEDURE — 0001A COVID-19, PFIZER VACCINE 30MCG/0.3ML DOSE: CPT | Performed by: NURSE PRACTITIONER

## 2021-01-01 PROCEDURE — 93017 CV STRESS TEST TRACING ONLY: CPT

## 2021-01-01 PROCEDURE — 31624 DX BRONCHOSCOPE/LAVAGE: CPT | Performed by: INTERNAL MEDICINE

## 2021-01-01 PROCEDURE — 82436 ASSAY OF URINE CHLORIDE: CPT

## 2021-01-01 PROCEDURE — 74176 CT ABD & PELVIS W/O CONTRAST: CPT

## 2021-01-01 PROCEDURE — 2500000003 HC RX 250 WO HCPCS: Performed by: RADIOLOGY

## 2021-01-01 PROCEDURE — 93016 CV STRESS TEST SUPVJ ONLY: CPT | Performed by: INTERNAL MEDICINE

## 2021-01-01 PROCEDURE — APPSS45 APP SPLIT SHARED TIME 31-45 MINUTES: Performed by: NURSE PRACTITIONER

## 2021-01-01 PROCEDURE — 99205 OFFICE O/P NEW HI 60 MIN: CPT

## 2021-01-01 PROCEDURE — 99213 OFFICE O/P EST LOW 20 MIN: CPT | Performed by: INTERNAL MEDICINE

## 2021-01-01 PROCEDURE — 93320 DOPPLER ECHO COMPLETE: CPT | Performed by: INTERNAL MEDICINE

## 2021-01-01 PROCEDURE — 84133 ASSAY OF URINE POTASSIUM: CPT

## 2021-01-01 PROCEDURE — 7100000011 HC PHASE II RECOVERY - ADDTL 15 MIN

## 2021-01-01 PROCEDURE — 74022 RADEX COMPL AQT ABD SERIES: CPT

## 2021-01-01 PROCEDURE — 93010 ELECTROCARDIOGRAM REPORT: CPT | Performed by: INTERNAL MEDICINE

## 2021-01-01 PROCEDURE — 87206 SMEAR FLUORESCENT/ACID STAI: CPT

## 2021-01-01 PROCEDURE — 99285 EMERGENCY DEPT VISIT HI MDM: CPT

## 2021-01-01 PROCEDURE — 78452 HT MUSCLE IMAGE SPECT MULT: CPT | Performed by: INTERNAL MEDICINE

## 2021-01-01 PROCEDURE — 93970 EXTREMITY STUDY: CPT | Performed by: RADIOLOGY

## 2021-01-01 RX ORDER — SODIUM CHLORIDE 9 MG/ML
INJECTION, SOLUTION INTRAVENOUS CONTINUOUS
Status: CANCELLED | OUTPATIENT
Start: 2021-01-01

## 2021-01-01 RX ORDER — SODIUM CHLORIDE 9 MG/ML
20 INJECTION, SOLUTION INTRAVENOUS ONCE
Status: DISCONTINUED | OUTPATIENT
Start: 2021-01-01 | End: 2021-01-01 | Stop reason: HOSPADM

## 2021-01-01 RX ORDER — SODIUM CHLORIDE 9 MG/ML
25 INJECTION, SOLUTION INTRAVENOUS PRN
Status: CANCELLED | OUTPATIENT
Start: 2021-01-01

## 2021-01-01 RX ORDER — MEPERIDINE HYDROCHLORIDE 25 MG/ML
12.5 INJECTION INTRAMUSCULAR; INTRAVENOUS; SUBCUTANEOUS EVERY 5 MIN PRN
Status: DISCONTINUED | OUTPATIENT
Start: 2021-01-01 | End: 2021-01-01 | Stop reason: HOSPADM

## 2021-01-01 RX ORDER — METHYLPREDNISOLONE SODIUM SUCCINATE 125 MG/2ML
125 INJECTION, POWDER, LYOPHILIZED, FOR SOLUTION INTRAMUSCULAR; INTRAVENOUS ONCE
Status: CANCELLED | OUTPATIENT
Start: 2021-01-01 | End: 2021-01-01

## 2021-01-01 RX ORDER — ACETAMINOPHEN 325 MG/1
650 TABLET ORAL EVERY 6 HOURS PRN
Status: DISCONTINUED | OUTPATIENT
Start: 2021-01-01 | End: 2021-01-01 | Stop reason: HOSPADM

## 2021-01-01 RX ORDER — METOPROLOL SUCCINATE 25 MG/1
100 TABLET, EXTENDED RELEASE ORAL ONCE
Status: DISCONTINUED | OUTPATIENT
Start: 2021-01-01 | End: 2021-01-01

## 2021-01-01 RX ORDER — METOPROLOL TARTRATE 5 MG/5ML
5 INJECTION INTRAVENOUS ONCE
Status: COMPLETED | OUTPATIENT
Start: 2021-01-01 | End: 2021-01-01

## 2021-01-01 RX ORDER — SODIUM CHLORIDE 9 MG/ML
20 INJECTION, SOLUTION INTRAVENOUS ONCE
Status: CANCELLED | OUTPATIENT
Start: 2021-01-01 | End: 2021-01-01

## 2021-01-01 RX ORDER — SODIUM CHLORIDE 0.9 % (FLUSH) 0.9 %
5-40 SYRINGE (ML) INJECTION PRN
Status: DISCONTINUED | OUTPATIENT
Start: 2021-01-01 | End: 2021-01-01 | Stop reason: HOSPADM

## 2021-01-01 RX ORDER — PROCHLORPERAZINE MALEATE 10 MG
10 TABLET ORAL EVERY 6 HOURS PRN
Qty: 60 TABLET | Refills: 1 | Status: ON HOLD | OUTPATIENT
Start: 2021-01-01 | End: 2022-01-01 | Stop reason: HOSPADM

## 2021-01-01 RX ORDER — KETAMINE HCL IN NACL, ISO-OSM 100MG/10ML
SYRINGE (ML) INJECTION PRN
Status: DISCONTINUED | OUTPATIENT
Start: 2021-01-01 | End: 2021-01-01 | Stop reason: SDUPTHER

## 2021-01-01 RX ORDER — DEXAMETHASONE SODIUM PHOSPHATE 10 MG/ML
10 INJECTION, SOLUTION INTRAMUSCULAR; INTRAVENOUS ONCE
Status: COMPLETED | OUTPATIENT
Start: 2021-01-01 | End: 2021-01-01

## 2021-01-01 RX ORDER — ALFENTANIL HYDROCHLORIDE 500 UG/ML
INJECTION INTRAVENOUS PRN
Status: DISCONTINUED | OUTPATIENT
Start: 2021-01-01 | End: 2021-01-01 | Stop reason: SDUPTHER

## 2021-01-01 RX ORDER — EPINEPHRINE 1 MG/ML
0.3 INJECTION, SOLUTION, CONCENTRATE INTRAVENOUS PRN
Status: CANCELLED | OUTPATIENT
Start: 2021-01-01

## 2021-01-01 RX ORDER — LIDOCAINE HYDROCHLORIDE 20 MG/ML
INJECTION, SOLUTION INTRAVENOUS PRN
Status: DISCONTINUED | OUTPATIENT
Start: 2021-01-01 | End: 2021-01-01 | Stop reason: SDUPTHER

## 2021-01-01 RX ORDER — ACETAMINOPHEN 650 MG/1
650 SUPPOSITORY RECTAL EVERY 6 HOURS PRN
Status: DISCONTINUED | OUTPATIENT
Start: 2021-01-01 | End: 2022-01-01 | Stop reason: HOSPADM

## 2021-01-01 RX ORDER — METOPROLOL TARTRATE 50 MG/1
100 TABLET, FILM COATED ORAL 2 TIMES DAILY
Status: DISCONTINUED | OUTPATIENT
Start: 2021-01-01 | End: 2021-01-01

## 2021-01-01 RX ORDER — SODIUM CHLORIDE 0.9 % (FLUSH) 0.9 %
5-40 SYRINGE (ML) INJECTION EVERY 12 HOURS SCHEDULED
Status: DISCONTINUED | OUTPATIENT
Start: 2021-01-01 | End: 2022-01-01 | Stop reason: HOSPADM

## 2021-01-01 RX ORDER — DOCUSATE SODIUM 100 MG/1
100 CAPSULE, LIQUID FILLED ORAL ONCE
Status: DISCONTINUED | OUTPATIENT
Start: 2021-01-01 | End: 2021-01-01 | Stop reason: HOSPADM

## 2021-01-01 RX ORDER — DIPHENHYDRAMINE HYDROCHLORIDE 50 MG/ML
50 INJECTION INTRAMUSCULAR; INTRAVENOUS ONCE
Status: COMPLETED | OUTPATIENT
Start: 2021-01-01 | End: 2021-01-01

## 2021-01-01 RX ORDER — FUROSEMIDE 10 MG/ML
20 INJECTION INTRAMUSCULAR; INTRAVENOUS ONCE
Status: COMPLETED | OUTPATIENT
Start: 2021-01-01 | End: 2021-01-01

## 2021-01-01 RX ORDER — PALONOSETRON HYDROCHLORIDE 0.05 MG/ML
0.25 INJECTION, SOLUTION INTRAVENOUS ONCE
Status: COMPLETED | OUTPATIENT
Start: 2021-01-01 | End: 2021-01-01

## 2021-01-01 RX ORDER — FENTANYL CITRATE 50 UG/ML
25 INJECTION, SOLUTION INTRAMUSCULAR; INTRAVENOUS EVERY 5 MIN PRN
Status: DISCONTINUED | OUTPATIENT
Start: 2021-01-01 | End: 2021-01-01 | Stop reason: HOSPADM

## 2021-01-01 RX ORDER — SODIUM CHLORIDE 0.9 % (FLUSH) 0.9 %
5-40 SYRINGE (ML) INJECTION PRN
Status: CANCELLED | OUTPATIENT
Start: 2021-01-01

## 2021-01-01 RX ORDER — APIXABAN 5 MG/1
5 TABLET, FILM COATED ORAL 2 TIMES DAILY
Status: ON HOLD | COMMUNITY
Start: 2021-01-01 | End: 2021-01-01 | Stop reason: HOSPADM

## 2021-01-01 RX ORDER — SODIUM CHLORIDE 9 MG/ML
25 INJECTION, SOLUTION INTRAVENOUS PRN
Status: DISCONTINUED | OUTPATIENT
Start: 2021-01-01 | End: 2021-01-01 | Stop reason: HOSPADM

## 2021-01-01 RX ORDER — PALONOSETRON HYDROCHLORIDE 0.05 MG/ML
0.25 INJECTION, SOLUTION INTRAVENOUS ONCE
Status: CANCELLED | OUTPATIENT
Start: 2021-01-01

## 2021-01-01 RX ORDER — HEPARIN SODIUM (PORCINE) LOCK FLUSH IV SOLN 100 UNIT/ML 100 UNIT/ML
500 SOLUTION INTRAVENOUS PRN
Status: CANCELLED | OUTPATIENT
Start: 2021-01-01

## 2021-01-01 RX ORDER — METOPROLOL SUCCINATE 50 MG/1
75 TABLET, EXTENDED RELEASE ORAL 2 TIMES DAILY
Status: ON HOLD | COMMUNITY
Start: 2021-01-01 | End: 2021-01-01 | Stop reason: HOSPADM

## 2021-01-01 RX ORDER — SODIUM CHLORIDE 9 MG/ML
25 INJECTION, SOLUTION INTRAVENOUS PRN
Status: DISCONTINUED | OUTPATIENT
Start: 2021-01-01 | End: 2022-01-01 | Stop reason: HOSPADM

## 2021-01-01 RX ORDER — DIPHENHYDRAMINE HYDROCHLORIDE 50 MG/ML
50 INJECTION INTRAMUSCULAR; INTRAVENOUS ONCE
Status: CANCELLED | OUTPATIENT
Start: 2021-01-01 | End: 2021-01-01

## 2021-01-01 RX ORDER — POTASSIUM CHLORIDE 7.45 MG/ML
10 INJECTION INTRAVENOUS PRN
Status: DISCONTINUED | OUTPATIENT
Start: 2021-01-01 | End: 2021-01-01 | Stop reason: HOSPADM

## 2021-01-01 RX ORDER — ONDANSETRON 2 MG/ML
4 INJECTION INTRAMUSCULAR; INTRAVENOUS EVERY 6 HOURS PRN
Status: DISCONTINUED | OUTPATIENT
Start: 2021-01-01 | End: 2021-01-01 | Stop reason: HOSPADM

## 2021-01-01 RX ORDER — ISOSORBIDE MONONITRATE 30 MG/1
60 TABLET, EXTENDED RELEASE ORAL DAILY
Qty: 30 TABLET | Refills: 3 | Status: SHIPPED
Start: 2021-01-01 | End: 2021-01-01 | Stop reason: SDUPTHER

## 2021-01-01 RX ORDER — MEPERIDINE HYDROCHLORIDE 25 MG/ML
12.5 INJECTION INTRAMUSCULAR; INTRAVENOUS; SUBCUTANEOUS ONCE
Status: CANCELLED | OUTPATIENT
Start: 2021-01-01 | End: 2021-01-01

## 2021-01-01 RX ORDER — DEXAMETHASONE SODIUM PHOSPHATE 10 MG/ML
10 INJECTION, SOLUTION INTRAMUSCULAR; INTRAVENOUS ONCE
Status: CANCELLED | OUTPATIENT
Start: 2021-01-01

## 2021-01-01 RX ORDER — ONDANSETRON 2 MG/ML
4 INJECTION INTRAMUSCULAR; INTRAVENOUS EVERY 6 HOURS PRN
Status: DISCONTINUED | OUTPATIENT
Start: 2021-01-01 | End: 2022-01-01 | Stop reason: HOSPADM

## 2021-01-01 RX ORDER — GLYCOPYRROLATE 1 MG/5 ML
SYRINGE (ML) INTRAVENOUS PRN
Status: DISCONTINUED | OUTPATIENT
Start: 2021-01-01 | End: 2021-01-01 | Stop reason: SDUPTHER

## 2021-01-01 RX ORDER — METOPROLOL SUCCINATE 100 MG/1
100 TABLET, EXTENDED RELEASE ORAL 2 TIMES DAILY
Status: DISCONTINUED | OUTPATIENT
Start: 2021-01-01 | End: 2021-01-01 | Stop reason: HOSPADM

## 2021-01-01 RX ORDER — FLUDEOXYGLUCOSE F 18 200 MCI/ML
15 INJECTION, SOLUTION INTRAVENOUS
Status: COMPLETED | OUTPATIENT
Start: 2021-01-01 | End: 2021-01-01

## 2021-01-01 RX ORDER — ACETAMINOPHEN 325 MG/1
650 TABLET ORAL EVERY 6 HOURS PRN
Status: DISCONTINUED | OUTPATIENT
Start: 2021-01-01 | End: 2022-01-01 | Stop reason: HOSPADM

## 2021-01-01 RX ORDER — EPHEDRINE SULFATE/0.9% NACL/PF 50 MG/5 ML
SYRINGE (ML) INTRAVENOUS PRN
Status: DISCONTINUED | OUTPATIENT
Start: 2021-01-01 | End: 2021-01-01 | Stop reason: SDUPTHER

## 2021-01-01 RX ORDER — DIPHENHYDRAMINE HYDROCHLORIDE 50 MG/ML
25 INJECTION INTRAMUSCULAR; INTRAVENOUS ONCE
Status: CANCELLED | OUTPATIENT
Start: 2021-01-01

## 2021-01-01 RX ORDER — POTASSIUM CHLORIDE 7.45 MG/ML
10 INJECTION INTRAVENOUS PRN
Status: DISCONTINUED | OUTPATIENT
Start: 2021-01-01 | End: 2022-01-01 | Stop reason: HOSPADM

## 2021-01-01 RX ORDER — DIPHENHYDRAMINE HYDROCHLORIDE 50 MG/ML
25 INJECTION INTRAMUSCULAR; INTRAVENOUS ONCE
Status: COMPLETED | OUTPATIENT
Start: 2021-01-01 | End: 2021-01-01

## 2021-01-01 RX ORDER — BUMETANIDE 0.25 MG/ML
0.5 INJECTION, SOLUTION INTRAMUSCULAR; INTRAVENOUS DAILY
Status: DISCONTINUED | OUTPATIENT
Start: 2021-01-01 | End: 2021-01-01

## 2021-01-01 RX ORDER — DIAPER,BRIEF,INFANT-TODD,DISP
EACH MISCELLANEOUS ONCE
Status: DISCONTINUED | OUTPATIENT
Start: 2021-01-01 | End: 2021-01-01 | Stop reason: HOSPADM

## 2021-01-01 RX ORDER — ROCURONIUM BROMIDE 10 MG/ML
INJECTION, SOLUTION INTRAVENOUS PRN
Status: DISCONTINUED | OUTPATIENT
Start: 2021-01-01 | End: 2021-01-01 | Stop reason: SDUPTHER

## 2021-01-01 RX ORDER — SODIUM CHLORIDE 9 MG/ML
INJECTION, SOLUTION INTRAVENOUS CONTINUOUS PRN
Status: DISCONTINUED | OUTPATIENT
Start: 2021-01-01 | End: 2021-01-01 | Stop reason: SDUPTHER

## 2021-01-01 RX ORDER — BUMETANIDE 0.25 MG/ML
0.5 INJECTION, SOLUTION INTRAMUSCULAR; INTRAVENOUS 2 TIMES DAILY
Status: DISCONTINUED | OUTPATIENT
Start: 2021-01-01 | End: 2021-01-01 | Stop reason: HOSPADM

## 2021-01-01 RX ORDER — DIPHENHYDRAMINE HYDROCHLORIDE 50 MG/ML
50 INJECTION INTRAMUSCULAR; INTRAVENOUS ONCE
Status: CANCELLED | OUTPATIENT
Start: 2021-01-01

## 2021-01-01 RX ORDER — LACTULOSE 10 G/15ML
10 SOLUTION ORAL 2 TIMES DAILY
Status: DISCONTINUED | OUTPATIENT
Start: 2021-01-01 | End: 2022-01-01 | Stop reason: HOSPADM

## 2021-01-01 RX ORDER — POTASSIUM CHLORIDE 20 MEQ/1
40 TABLET, EXTENDED RELEASE ORAL PRN
Status: DISCONTINUED | OUTPATIENT
Start: 2021-01-01 | End: 2021-01-01 | Stop reason: HOSPADM

## 2021-01-01 RX ORDER — POLYETHYLENE GLYCOL 3350 17 G/17G
17 POWDER, FOR SOLUTION ORAL DAILY PRN
Status: DISCONTINUED | OUTPATIENT
Start: 2021-01-01 | End: 2021-01-01 | Stop reason: HOSPADM

## 2021-01-01 RX ORDER — ACETAMINOPHEN 650 MG/1
650 SUPPOSITORY RECTAL EVERY 6 HOURS PRN
Status: DISCONTINUED | OUTPATIENT
Start: 2021-01-01 | End: 2021-01-01 | Stop reason: HOSPADM

## 2021-01-01 RX ORDER — FUROSEMIDE 10 MG/ML
40 INJECTION INTRAMUSCULAR; INTRAVENOUS ONCE
Status: COMPLETED | OUTPATIENT
Start: 2021-01-01 | End: 2021-01-01

## 2021-01-01 RX ORDER — BUMETANIDE 1 MG/1
1 TABLET ORAL DAILY
Status: DISCONTINUED | OUTPATIENT
Start: 2021-01-01 | End: 2022-01-01 | Stop reason: HOSPADM

## 2021-01-01 RX ORDER — DEXAMETHASONE SODIUM PHOSPHATE 10 MG/ML
INJECTION, SOLUTION INTRAMUSCULAR; INTRAVENOUS PRN
Status: DISCONTINUED | OUTPATIENT
Start: 2021-01-01 | End: 2021-01-01 | Stop reason: SDUPTHER

## 2021-01-01 RX ORDER — ONDANSETRON 4 MG/1
4 TABLET, ORALLY DISINTEGRATING ORAL EVERY 8 HOURS PRN
Status: DISCONTINUED | OUTPATIENT
Start: 2021-01-01 | End: 2021-01-01 | Stop reason: HOSPADM

## 2021-01-01 RX ORDER — FUROSEMIDE 20 MG/1
20 TABLET ORAL DAILY
Status: DISCONTINUED | OUTPATIENT
Start: 2021-01-01 | End: 2021-01-01

## 2021-01-01 RX ORDER — SODIUM CHLORIDE 9 MG/ML
20 INJECTION, SOLUTION INTRAVENOUS ONCE
Status: COMPLETED | OUTPATIENT
Start: 2021-01-01 | End: 2021-01-01

## 2021-01-01 RX ORDER — POTASSIUM CHLORIDE 20 MEQ/1
40 TABLET, EXTENDED RELEASE ORAL PRN
Status: DISCONTINUED | OUTPATIENT
Start: 2021-01-01 | End: 2022-01-01 | Stop reason: HOSPADM

## 2021-01-01 RX ORDER — SENNA PLUS 8.6 MG/1
1 TABLET ORAL DAILY PRN
Status: DISCONTINUED | OUTPATIENT
Start: 2021-01-01 | End: 2022-01-01 | Stop reason: HOSPADM

## 2021-01-01 RX ORDER — LOSARTAN POTASSIUM 50 MG/1
50 TABLET ORAL DAILY
Status: ON HOLD | COMMUNITY
Start: 2021-01-01 | End: 2021-01-01 | Stop reason: HOSPADM

## 2021-01-01 RX ORDER — PROCHLORPERAZINE EDISYLATE 5 MG/ML
5 INJECTION INTRAMUSCULAR; INTRAVENOUS
Status: DISCONTINUED | OUTPATIENT
Start: 2021-01-01 | End: 2021-01-01 | Stop reason: HOSPADM

## 2021-01-01 RX ORDER — PROPOFOL 10 MG/ML
INJECTION, EMULSION INTRAVENOUS PRN
Status: DISCONTINUED | OUTPATIENT
Start: 2021-01-01 | End: 2021-01-01 | Stop reason: SDUPTHER

## 2021-01-01 RX ORDER — ONDANSETRON 4 MG/1
4 TABLET, ORALLY DISINTEGRATING ORAL EVERY 8 HOURS PRN
Status: DISCONTINUED | OUTPATIENT
Start: 2021-01-01 | End: 2022-01-01 | Stop reason: HOSPADM

## 2021-01-01 RX ORDER — IPRATROPIUM BROMIDE AND ALBUTEROL SULFATE 2.5; .5 MG/3ML; MG/3ML
1 SOLUTION RESPIRATORY (INHALATION) EVERY 6 HOURS PRN
Status: DISCONTINUED | OUTPATIENT
Start: 2021-01-01 | End: 2021-01-01

## 2021-01-01 RX ORDER — LACTULOSE 10 G/10G
10 SOLUTION ORAL 2 TIMES DAILY
COMMUNITY

## 2021-01-01 RX ORDER — ATORVASTATIN CALCIUM 10 MG/1
10 TABLET, FILM COATED ORAL EVERY OTHER DAY
Status: DISCONTINUED | OUTPATIENT
Start: 2021-01-01 | End: 2022-01-01 | Stop reason: HOSPADM

## 2021-01-01 RX ORDER — CHOLECALCIFEROL (VITAMIN D3) 50 MCG
2000 TABLET ORAL DAILY
Status: DISCONTINUED | OUTPATIENT
Start: 2021-01-01 | End: 2021-01-01 | Stop reason: HOSPADM

## 2021-01-01 RX ORDER — ISOSORBIDE MONONITRATE 60 MG/1
60 TABLET, EXTENDED RELEASE ORAL DAILY
Status: DISCONTINUED | OUTPATIENT
Start: 2021-01-01 | End: 2022-01-01 | Stop reason: HOSPADM

## 2021-01-01 RX ORDER — METOPROLOL SUCCINATE 100 MG/1
100 TABLET, EXTENDED RELEASE ORAL 2 TIMES DAILY
Qty: 60 TABLET | Refills: 3 | Status: SHIPPED | OUTPATIENT
Start: 2021-01-01

## 2021-01-01 RX ORDER — ISOSORBIDE MONONITRATE 30 MG/1
60 TABLET, EXTENDED RELEASE ORAL DAILY
Qty: 60 TABLET | Refills: 5 | Status: SHIPPED | OUTPATIENT
Start: 2021-01-01

## 2021-01-01 RX ORDER — IPRATROPIUM BROMIDE AND ALBUTEROL SULFATE 2.5; .5 MG/3ML; MG/3ML
1 SOLUTION RESPIRATORY (INHALATION) EVERY 6 HOURS PRN
Status: DISCONTINUED | OUTPATIENT
Start: 2021-01-01 | End: 2021-01-01 | Stop reason: HOSPADM

## 2021-01-01 RX ORDER — SODIUM CHLORIDE 0.9 % (FLUSH) 0.9 %
5-40 SYRINGE (ML) INJECTION PRN
Status: DISCONTINUED | OUTPATIENT
Start: 2021-01-01 | End: 2022-01-01 | Stop reason: HOSPADM

## 2021-01-01 RX ORDER — DILTIAZEM HYDROCHLORIDE 120 MG/1
120 CAPSULE, COATED, EXTENDED RELEASE ORAL DAILY
Qty: 30 CAPSULE | Refills: 3 | Status: SHIPPED | OUTPATIENT
Start: 2021-01-01

## 2021-01-01 RX ORDER — METHYLPREDNISOLONE SODIUM SUCCINATE 125 MG/2ML
125 INJECTION, POWDER, LYOPHILIZED, FOR SOLUTION INTRAMUSCULAR; INTRAVENOUS ONCE
Status: COMPLETED | OUTPATIENT
Start: 2021-01-01 | End: 2021-01-01

## 2021-01-01 RX ORDER — ATORVASTATIN CALCIUM 10 MG/1
10 TABLET, FILM COATED ORAL EVERY OTHER DAY
Status: DISCONTINUED | OUTPATIENT
Start: 2021-01-01 | End: 2021-01-01 | Stop reason: HOSPADM

## 2021-01-01 RX ORDER — MAGNESIUM SULFATE IN WATER 40 MG/ML
2000 INJECTION, SOLUTION INTRAVENOUS PRN
Status: DISCONTINUED | OUTPATIENT
Start: 2021-01-01 | End: 2021-01-01 | Stop reason: HOSPADM

## 2021-01-01 RX ORDER — LIDOCAINE HYDROCHLORIDE 20 MG/ML
INJECTION, SOLUTION INFILTRATION; PERINEURAL
Status: COMPLETED | OUTPATIENT
Start: 2021-01-01 | End: 2021-01-01

## 2021-01-01 RX ORDER — SODIUM CHLORIDE 0.9 % (FLUSH) 0.9 %
5-40 SYRINGE (ML) INJECTION EVERY 12 HOURS SCHEDULED
Status: DISCONTINUED | OUTPATIENT
Start: 2021-01-01 | End: 2021-01-01 | Stop reason: HOSPADM

## 2021-01-01 RX ORDER — ONDANSETRON 4 MG/1
4 TABLET, ORALLY DISINTEGRATING ORAL EVERY 8 HOURS PRN
Qty: 60 TABLET | Refills: 1 | Status: SHIPPED | OUTPATIENT
Start: 2021-01-01

## 2021-01-01 RX ORDER — METOPROLOL SUCCINATE 100 MG/1
100 TABLET, EXTENDED RELEASE ORAL 2 TIMES DAILY
Status: DISCONTINUED | OUTPATIENT
Start: 2021-01-01 | End: 2022-01-01 | Stop reason: HOSPADM

## 2021-01-01 RX ORDER — ONDANSETRON 2 MG/ML
INJECTION INTRAMUSCULAR; INTRAVENOUS PRN
Status: DISCONTINUED | OUTPATIENT
Start: 2021-01-01 | End: 2021-01-01 | Stop reason: SDUPTHER

## 2021-01-01 RX ORDER — IPRATROPIUM BROMIDE AND ALBUTEROL SULFATE 2.5; .5 MG/3ML; MG/3ML
1 SOLUTION RESPIRATORY (INHALATION)
Status: DISCONTINUED | OUTPATIENT
Start: 2021-01-01 | End: 2022-01-01 | Stop reason: HOSPADM

## 2021-01-01 RX ORDER — METOPROLOL TARTRATE 50 MG/1
75 TABLET, FILM COATED ORAL 2 TIMES DAILY
Qty: 90 TABLET | Refills: 5 | Status: SHIPPED
Start: 2021-01-01 | End: 2021-01-01

## 2021-01-01 RX ORDER — SODIUM CHLORIDE 0.9 % (FLUSH) 0.9 %
10 SYRINGE (ML) INJECTION PRN
Status: DISCONTINUED | OUTPATIENT
Start: 2021-01-01 | End: 2021-01-01 | Stop reason: HOSPADM

## 2021-01-01 RX ORDER — IPRATROPIUM BROMIDE AND ALBUTEROL SULFATE 2.5; .5 MG/3ML; MG/3ML
1 SOLUTION RESPIRATORY (INHALATION)
Status: COMPLETED | OUTPATIENT
Start: 2021-01-01 | End: 2021-01-01

## 2021-01-01 RX ORDER — OXYCODONE HYDROCHLORIDE 5 MG/1
5 TABLET ORAL EVERY 6 HOURS PRN
Status: DISCONTINUED | OUTPATIENT
Start: 2021-01-01 | End: 2022-01-01 | Stop reason: HOSPADM

## 2021-01-01 RX ORDER — DIPHENHYDRAMINE HYDROCHLORIDE 50 MG/ML
12.5 INJECTION INTRAMUSCULAR; INTRAVENOUS
Status: DISCONTINUED | OUTPATIENT
Start: 2021-01-01 | End: 2021-01-01 | Stop reason: HOSPADM

## 2021-01-01 RX ORDER — ESMOLOL HYDROCHLORIDE 10 MG/ML
INJECTION INTRAVENOUS PRN
Status: DISCONTINUED | OUTPATIENT
Start: 2021-01-01 | End: 2021-01-01 | Stop reason: SDUPTHER

## 2021-01-01 RX ORDER — HYDROCODONE BITARTRATE AND ACETAMINOPHEN 5; 325 MG/1; MG/1
1 TABLET ORAL
Status: DISCONTINUED | OUTPATIENT
Start: 2021-01-01 | End: 2021-01-01 | Stop reason: HOSPADM

## 2021-01-01 RX ORDER — DOCUSATE SODIUM 100 MG/1
100 CAPSULE, LIQUID FILLED ORAL 2 TIMES DAILY PRN
Qty: 30 CAPSULE | Refills: 0 | Status: SHIPPED | OUTPATIENT
Start: 2021-01-01

## 2021-01-01 RX ORDER — VASOPRESSIN 20 U/ML
INJECTION PARENTERAL
Status: DISCONTINUED
Start: 2021-01-01 | End: 2021-01-01 | Stop reason: HOSPADM

## 2021-01-01 RX ORDER — IPRATROPIUM BROMIDE AND ALBUTEROL SULFATE 2.5; .5 MG/3ML; MG/3ML
1 SOLUTION RESPIRATORY (INHALATION)
Status: DISCONTINUED | OUTPATIENT
Start: 2021-01-01 | End: 2021-01-01 | Stop reason: HOSPADM

## 2021-01-01 RX ORDER — METHYLPREDNISOLONE SODIUM SUCCINATE 125 MG/2ML
60 INJECTION, POWDER, LYOPHILIZED, FOR SOLUTION INTRAMUSCULAR; INTRAVENOUS ONCE
Status: COMPLETED | OUTPATIENT
Start: 2021-01-01 | End: 2021-01-01

## 2021-01-01 RX ORDER — ATORVASTATIN CALCIUM 10 MG/1
10 TABLET, FILM COATED ORAL DAILY
Status: DISCONTINUED | OUTPATIENT
Start: 2021-01-01 | End: 2021-01-01

## 2021-01-01 RX ORDER — ATORVASTATIN CALCIUM 20 MG/1
20 TABLET, FILM COATED ORAL DAILY
Status: DISCONTINUED | OUTPATIENT
Start: 2021-01-01 | End: 2021-01-01 | Stop reason: HOSPADM

## 2021-01-01 RX ORDER — DIPHENHYDRAMINE HYDROCHLORIDE 50 MG/ML
12.5 INJECTION INTRAMUSCULAR; INTRAVENOUS ONCE
Status: COMPLETED | OUTPATIENT
Start: 2021-01-01 | End: 2021-01-01

## 2021-01-01 RX ORDER — BUMETANIDE 1 MG/1
1 TABLET ORAL DAILY
Qty: 30 TABLET | Refills: 0 | Status: SHIPPED | OUTPATIENT
Start: 2021-01-01

## 2021-01-01 RX ORDER — TC 99M MEDRONATE 20 MG/10ML
25 INJECTION, POWDER, LYOPHILIZED, FOR SOLUTION INTRAVENOUS
Status: COMPLETED | OUTPATIENT
Start: 2021-01-01 | End: 2021-01-01

## 2021-01-01 RX ORDER — DILTIAZEM HYDROCHLORIDE 120 MG/1
120 CAPSULE, COATED, EXTENDED RELEASE ORAL DAILY
Status: DISCONTINUED | OUTPATIENT
Start: 2021-01-01 | End: 2022-01-01 | Stop reason: HOSPADM

## 2021-01-01 RX ORDER — DIPHENHYDRAMINE HYDROCHLORIDE 50 MG/ML
12.5 INJECTION INTRAMUSCULAR; INTRAVENOUS ONCE
Status: CANCELLED | OUTPATIENT
Start: 2021-01-01

## 2021-01-01 RX ADMIN — DIPHENHYDRAMINE HYDROCHLORIDE 25 MG: 50 INJECTION, SOLUTION INTRAMUSCULAR; INTRAVENOUS at 09:06

## 2021-01-01 RX ADMIN — METOPROLOL SUCCINATE 100 MG: 100 TABLET, EXTENDED RELEASE ORAL at 21:18

## 2021-01-01 RX ADMIN — IPRATROPIUM BROMIDE AND ALBUTEROL SULFATE 1 AMPULE: .5; 3 SOLUTION RESPIRATORY (INHALATION) at 09:28

## 2021-01-01 RX ADMIN — APIXABAN 2.5 MG: 2.5 TABLET, FILM COATED ORAL at 21:00

## 2021-01-01 RX ADMIN — ATORVASTATIN CALCIUM 10 MG: 10 TABLET, FILM COATED ORAL at 21:00

## 2021-01-01 RX ADMIN — IPRATROPIUM BROMIDE AND ALBUTEROL SULFATE 1 AMPULE: .5; 3 SOLUTION RESPIRATORY (INHALATION) at 07:37

## 2021-01-01 RX ADMIN — SODIUM CHLORIDE, PRESERVATIVE FREE 10 ML: 5 INJECTION INTRAVENOUS at 07:58

## 2021-01-01 RX ADMIN — Medication 5 MG: at 10:49

## 2021-01-01 RX ADMIN — IPRATROPIUM BROMIDE AND ALBUTEROL SULFATE 1 AMPULE: .5; 2.5 SOLUTION RESPIRATORY (INHALATION) at 17:50

## 2021-01-01 RX ADMIN — IPRATROPIUM BROMIDE AND ALBUTEROL SULFATE 1 AMPULE: .5; 2.5 SOLUTION RESPIRATORY (INHALATION) at 09:05

## 2021-01-01 RX ADMIN — SODIUM CHLORIDE, PRESERVATIVE FREE 10 ML: 5 INJECTION INTRAVENOUS at 09:23

## 2021-01-01 RX ADMIN — METOPROLOL SUCCINATE 100 MG: 100 TABLET, EXTENDED RELEASE ORAL at 21:00

## 2021-01-01 RX ADMIN — METOPROLOL TARTRATE 5 MG: 1 INJECTION, SOLUTION INTRAVENOUS at 11:27

## 2021-01-01 RX ADMIN — DILTIAZEM HYDROCHLORIDE 120 MG: 120 CAPSULE, COATED, EXTENDED RELEASE ORAL at 08:36

## 2021-01-01 RX ADMIN — PALONOSETRON 0.25 MG: 0.25 INJECTION, SOLUTION INTRAVENOUS at 10:45

## 2021-01-01 RX ADMIN — SODIUM CHLORIDE 20 ML/HR: 9 INJECTION, SOLUTION INTRAVENOUS at 09:50

## 2021-01-01 RX ADMIN — Medication 10 ML: at 08:56

## 2021-01-01 RX ADMIN — Medication 10 ML: at 20:48

## 2021-01-01 RX ADMIN — LACTULOSE 10 G: 20 SOLUTION ORAL at 20:47

## 2021-01-01 RX ADMIN — Medication 10 MG: at 10:18

## 2021-01-01 RX ADMIN — TC 99M MEDRONATE 27 MILLICURIE: 20 INJECTION, POWDER, LYOPHILIZED, FOR SOLUTION INTRAVENOUS at 09:00

## 2021-01-01 RX ADMIN — ATORVASTATIN CALCIUM 10 MG: 10 TABLET, FILM COATED ORAL at 09:08

## 2021-01-01 RX ADMIN — SODIUM CHLORIDE 20 ML/HR: 9 INJECTION, SOLUTION INTRAVENOUS at 09:31

## 2021-01-01 RX ADMIN — IPRATROPIUM BROMIDE AND ALBUTEROL SULFATE 1 AMPULE: .5; 3 SOLUTION RESPIRATORY (INHALATION) at 07:38

## 2021-01-01 RX ADMIN — SODIUM CHLORIDE, PRESERVATIVE FREE 10 ML: 5 INJECTION INTRAVENOUS at 10:20

## 2021-01-01 RX ADMIN — METOPROLOL SUCCINATE 100 MG: 100 TABLET, EXTENDED RELEASE ORAL at 20:47

## 2021-01-01 RX ADMIN — Medication 2000 UNITS: at 08:36

## 2021-01-01 RX ADMIN — PACLITAXEL 72 MG: 6 INJECTION, SOLUTION, CONCENTRATE INTRAVENOUS at 10:45

## 2021-01-01 RX ADMIN — METOPROLOL SUCCINATE 100 MG: 100 TABLET, EXTENDED RELEASE ORAL at 14:34

## 2021-01-01 RX ADMIN — IPRATROPIUM BROMIDE AND ALBUTEROL SULFATE 1 AMPULE: .5; 3 SOLUTION RESPIRATORY (INHALATION) at 13:44

## 2021-01-01 RX ADMIN — REGADENOSON 0.4 MG: 0.08 INJECTION, SOLUTION INTRAVENOUS at 10:23

## 2021-01-01 RX ADMIN — SODIUM CHLORIDE, PRESERVATIVE FREE 10 ML: 5 INJECTION INTRAVENOUS at 09:08

## 2021-01-01 RX ADMIN — IPRATROPIUM BROMIDE AND ALBUTEROL SULFATE 1 AMPULE: .5; 3 SOLUTION RESPIRATORY (INHALATION) at 16:29

## 2021-01-01 RX ADMIN — METOPROLOL SUCCINATE 100 MG: 100 TABLET, EXTENDED RELEASE ORAL at 20:07

## 2021-01-01 RX ADMIN — METHYLPREDNISOLONE SODIUM SUCCINATE 125 MG: 125 INJECTION, POWDER, FOR SOLUTION INTRAMUSCULAR; INTRAVENOUS at 08:11

## 2021-01-01 RX ADMIN — ATORVASTATIN CALCIUM 10 MG: 10 TABLET, FILM COATED ORAL at 21:18

## 2021-01-01 RX ADMIN — PALONOSETRON 0.25 MG: 0.25 INJECTION, SOLUTION INTRAVENOUS at 09:15

## 2021-01-01 RX ADMIN — FILGRASTIM-AAFI 300 MCG: 300 INJECTION, SOLUTION SUBCUTANEOUS at 10:05

## 2021-01-01 RX ADMIN — FILGRASTIM-AAFI 300 MCG: 300 INJECTION, SOLUTION SUBCUTANEOUS at 10:10

## 2021-01-01 RX ADMIN — SODIUM CHLORIDE, PRESERVATIVE FREE 10 ML: 5 INJECTION INTRAVENOUS at 08:37

## 2021-01-01 RX ADMIN — Medication 10 MG: at 11:02

## 2021-01-01 RX ADMIN — DIPHENHYDRAMINE HYDROCHLORIDE 50 MG: 50 INJECTION, SOLUTION INTRAMUSCULAR; INTRAVENOUS at 09:25

## 2021-01-01 RX ADMIN — ENOXAPARIN SODIUM 30 MG: 30 INJECTION, SOLUTION INTRAVENOUS; SUBCUTANEOUS at 08:36

## 2021-01-01 RX ADMIN — SODIUM CHLORIDE, PRESERVATIVE FREE 10 ML: 5 INJECTION INTRAVENOUS at 09:22

## 2021-01-01 RX ADMIN — SODIUM CHLORIDE 20 ML/HR: 9 INJECTION, SOLUTION INTRAVENOUS at 09:01

## 2021-01-01 RX ADMIN — PROPOFOL 20 MG: 10 INJECTION, EMULSION INTRAVENOUS at 10:59

## 2021-01-01 RX ADMIN — APIXABAN 2.5 MG: 2.5 TABLET, FILM COATED ORAL at 07:58

## 2021-01-01 RX ADMIN — IPRATROPIUM BROMIDE AND ALBUTEROL SULFATE 1 AMPULE: .5; 2.5 SOLUTION RESPIRATORY (INHALATION) at 21:02

## 2021-01-01 RX ADMIN — FAMOTIDINE 20 MG: 10 INJECTION INTRAVENOUS at 10:14

## 2021-01-01 RX ADMIN — Medication 15 MG: at 10:22

## 2021-01-01 RX ADMIN — METOPROLOL SUCCINATE 100 MG: 100 TABLET, EXTENDED RELEASE ORAL at 07:58

## 2021-01-01 RX ADMIN — DEXAMETHASONE SODIUM PHOSPHATE 10 MG: 10 INJECTION, SOLUTION INTRAMUSCULAR; INTRAVENOUS at 09:29

## 2021-01-01 RX ADMIN — BUMETANIDE 0.5 MG: 0.25 INJECTION, SOLUTION INTRAMUSCULAR; INTRAVENOUS at 14:35

## 2021-01-01 RX ADMIN — Medication 5 MG: at 11:20

## 2021-01-01 RX ADMIN — METOPROLOL SUCCINATE 100 MG: 100 TABLET, EXTENDED RELEASE ORAL at 08:55

## 2021-01-01 RX ADMIN — DIPHENHYDRAMINE HYDROCHLORIDE 12.5 MG: 50 INJECTION INTRAMUSCULAR; INTRAVENOUS at 09:37

## 2021-01-01 RX ADMIN — METOPROLOL SUCCINATE 100 MG: 100 TABLET, EXTENDED RELEASE ORAL at 01:01

## 2021-01-01 RX ADMIN — SODIUM CHLORIDE, PRESERVATIVE FREE 10 ML: 5 INJECTION INTRAVENOUS at 09:46

## 2021-01-01 RX ADMIN — ROCURONIUM BROMIDE 10 MG: 10 INJECTION, SOLUTION INTRAVENOUS at 11:26

## 2021-01-01 RX ADMIN — FILGRASTIM-AAFI 300 MCG: 300 INJECTION, SOLUTION SUBCUTANEOUS at 09:43

## 2021-01-01 RX ADMIN — SODIUM CHLORIDE, PRESERVATIVE FREE 10 ML: 5 INJECTION INTRAVENOUS at 21:19

## 2021-01-01 RX ADMIN — METOPROLOL SUCCINATE 100 MG: 100 TABLET, EXTENDED RELEASE ORAL at 08:36

## 2021-01-01 RX ADMIN — BUMETANIDE 1 MG: 1 TABLET ORAL at 08:36

## 2021-01-01 RX ADMIN — SODIUM CHLORIDE, PRESERVATIVE FREE 10 ML: 5 INJECTION INTRAVENOUS at 09:06

## 2021-01-01 RX ADMIN — Medication 0.1 MG: at 10:41

## 2021-01-01 RX ADMIN — PROPOFOL 60 MG: 10 INJECTION, EMULSION INTRAVENOUS at 10:18

## 2021-01-01 RX ADMIN — PALONOSETRON 0.25 MG: 0.25 INJECTION, SOLUTION INTRAVENOUS at 09:32

## 2021-01-01 RX ADMIN — FUROSEMIDE 40 MG: 10 INJECTION, SOLUTION INTRAMUSCULAR; INTRAVENOUS at 11:26

## 2021-01-01 RX ADMIN — ESMOLOL HYDROCHLORIDE 10 MG: 10 INJECTION, SOLUTION INTRAVENOUS at 10:18

## 2021-01-01 RX ADMIN — METHYLPREDNISOLONE SODIUM SUCCINATE 60 MG: 125 INJECTION, POWDER, FOR SOLUTION INTRAMUSCULAR; INTRAVENOUS at 09:19

## 2021-01-01 RX ADMIN — Medication 0.2 MG: at 10:15

## 2021-01-01 RX ADMIN — SODIUM CHLORIDE, PRESERVATIVE FREE 10 ML: 5 INJECTION INTRAVENOUS at 10:14

## 2021-01-01 RX ADMIN — IPRATROPIUM BROMIDE AND ALBUTEROL SULFATE 1 AMPULE: .5; 3 SOLUTION RESPIRATORY (INHALATION) at 07:36

## 2021-01-01 RX ADMIN — ATORVASTATIN CALCIUM 10 MG: 10 TABLET, FILM COATED ORAL at 07:58

## 2021-01-01 RX ADMIN — ACETAMINOPHEN 650 MG: 325 TABLET ORAL at 20:47

## 2021-01-01 RX ADMIN — FUROSEMIDE 40 MG: 10 INJECTION, SOLUTION INTRAVENOUS at 12:00

## 2021-01-01 RX ADMIN — ISOSORBIDE MONONITRATE 60 MG: 60 TABLET ORAL at 08:36

## 2021-01-01 RX ADMIN — SODIUM CHLORIDE 72 MG: 9 INJECTION, SOLUTION INTRAVENOUS at 10:17

## 2021-01-01 RX ADMIN — FAMOTIDINE 20 MG: 10 INJECTION INTRAVENOUS at 09:33

## 2021-01-01 RX ADMIN — CARBOPLATIN 80 MG: 10 INJECTION, SOLUTION INTRAVENOUS at 11:31

## 2021-01-01 RX ADMIN — SODIUM CHLORIDE 72 MG: 9 INJECTION, SOLUTION INTRAVENOUS at 09:34

## 2021-01-01 RX ADMIN — LACTULOSE 10 G: 20 SOLUTION ORAL at 21:00

## 2021-01-01 RX ADMIN — SODIUM CHLORIDE: 9 INJECTION, SOLUTION INTRAVENOUS at 10:02

## 2021-01-01 RX ADMIN — SODIUM CHLORIDE: 9 INJECTION, SOLUTION INTRAVENOUS at 13:50

## 2021-01-01 RX ADMIN — SODIUM CHLORIDE, PRESERVATIVE FREE 10 ML: 5 INJECTION INTRAVENOUS at 20:44

## 2021-01-01 RX ADMIN — SODIUM CHLORIDE, PRESERVATIVE FREE 10 ML: 5 INJECTION INTRAVENOUS at 10:53

## 2021-01-01 RX ADMIN — METOPROLOL TARTRATE 5 MG: 1 INJECTION, SOLUTION INTRAVENOUS at 11:14

## 2021-01-01 RX ADMIN — DILTIAZEM HYDROCHLORIDE 120 MG: 120 CAPSULE, COATED, EXTENDED RELEASE ORAL at 08:55

## 2021-01-01 RX ADMIN — ATORVASTATIN CALCIUM 10 MG: 10 TABLET, FILM COATED ORAL at 21:36

## 2021-01-01 RX ADMIN — SODIUM CHLORIDE, PRESERVATIVE FREE 10 ML: 5 INJECTION INTRAVENOUS at 10:25

## 2021-01-01 RX ADMIN — OXYCODONE 5 MG: 5 TABLET ORAL at 20:07

## 2021-01-01 RX ADMIN — FLUDEOXYGLUCOSE F 18 15 MILLICURIE: 200 INJECTION, SOLUTION INTRAVENOUS at 10:41

## 2021-01-01 RX ADMIN — BUMETANIDE 0.5 MG: 0.25 INJECTION, SOLUTION INTRAMUSCULAR; INTRAVENOUS at 08:36

## 2021-01-01 RX ADMIN — ROCURONIUM BROMIDE 30 MG: 10 INJECTION, SOLUTION INTRAVENOUS at 10:18

## 2021-01-01 RX ADMIN — ONDANSETRON HYDROCHLORIDE 4 MG: 2 INJECTION, SOLUTION INTRAMUSCULAR; INTRAVENOUS at 10:18

## 2021-01-01 RX ADMIN — ISOSORBIDE MONONITRATE 60 MG: 60 TABLET ORAL at 08:55

## 2021-01-01 RX ADMIN — Medication 10 MG: at 10:25

## 2021-01-01 RX ADMIN — Medication 2000 UNITS: at 14:34

## 2021-01-01 RX ADMIN — METOPROLOL TARTRATE 75 MG: 50 TABLET, FILM COATED ORAL at 21:36

## 2021-01-01 RX ADMIN — SODIUM CHLORIDE, PRESERVATIVE FREE 10 ML: 5 INJECTION INTRAVENOUS at 09:42

## 2021-01-01 RX ADMIN — OXYCODONE 5 MG: 5 TABLET ORAL at 01:01

## 2021-01-01 RX ADMIN — SODIUM CHLORIDE, PRESERVATIVE FREE 10 ML: 5 INJECTION INTRAVENOUS at 09:37

## 2021-01-01 RX ADMIN — METOPROLOL TARTRATE 5 MG: 1 INJECTION, SOLUTION INTRAVENOUS at 16:58

## 2021-01-01 RX ADMIN — FUROSEMIDE 20 MG: 10 INJECTION, SOLUTION INTRAMUSCULAR; INTRAVENOUS at 09:19

## 2021-01-01 RX ADMIN — FAMOTIDINE 20 MG: 10 INJECTION INTRAVENOUS at 09:21

## 2021-01-01 RX ADMIN — SODIUM CHLORIDE 20 ML/HR: 9 INJECTION, SOLUTION INTRAVENOUS at 09:06

## 2021-01-01 RX ADMIN — METOPROLOL SUCCINATE 100 MG: 100 TABLET, EXTENDED RELEASE ORAL at 09:08

## 2021-01-01 RX ADMIN — IPRATROPIUM BROMIDE AND ALBUTEROL SULFATE 1 AMPULE: .5; 3 SOLUTION RESPIRATORY (INHALATION) at 20:06

## 2021-01-01 RX ADMIN — DEXAMETHASONE SODIUM PHOSPHATE 10 MG: 10 INJECTION, SOLUTION INTRAMUSCULAR; INTRAVENOUS at 10:19

## 2021-01-01 RX ADMIN — SODIUM CHLORIDE 72 MG: 9 INJECTION, SOLUTION INTRAVENOUS at 11:36

## 2021-01-01 RX ADMIN — DIPHENHYDRAMINE HYDROCHLORIDE 50 MG: 50 INJECTION, SOLUTION INTRAMUSCULAR; INTRAVENOUS at 10:25

## 2021-01-01 RX ADMIN — PALONOSETRON 0.25 MG: 0.25 INJECTION, SOLUTION INTRAVENOUS at 09:47

## 2021-01-01 RX ADMIN — CARBOPLATIN 75 MG: 10 INJECTION, SOLUTION INTRAVENOUS at 12:49

## 2021-01-01 RX ADMIN — Medication 35 MILLICURIE: at 10:28

## 2021-01-01 RX ADMIN — SODIUM CHLORIDE 20 ML/HR: 9 INJECTION, SOLUTION INTRAVENOUS at 10:34

## 2021-01-01 RX ADMIN — DIPHENHYDRAMINE HYDROCHLORIDE 50 MG: 50 INJECTION, SOLUTION INTRAMUSCULAR; INTRAVENOUS at 10:40

## 2021-01-01 RX ADMIN — SODIUM CHLORIDE, PRESERVATIVE FREE 10 ML: 5 INJECTION INTRAVENOUS at 16:58

## 2021-01-01 RX ADMIN — ATORVASTATIN CALCIUM 10 MG: 10 TABLET, FILM COATED ORAL at 08:35

## 2021-01-01 RX ADMIN — PROPOFOL 150 MG: 10 INJECTION, EMULSION INTRAVENOUS at 14:48

## 2021-01-01 RX ADMIN — FAMOTIDINE 20 MG: 10 INJECTION INTRAVENOUS at 10:36

## 2021-01-01 RX ADMIN — IPRATROPIUM BROMIDE AND ALBUTEROL SULFATE 1 AMPULE: .5; 3 SOLUTION RESPIRATORY (INHALATION) at 06:28

## 2021-01-01 RX ADMIN — METOPROLOL SUCCINATE 100 MG: 100 TABLET, EXTENDED RELEASE ORAL at 20:44

## 2021-01-01 RX ADMIN — APIXABAN 2.5 MG: 5 TABLET, FILM COATED ORAL at 20:47

## 2021-01-01 RX ADMIN — ENOXAPARIN SODIUM 30 MG: 30 INJECTION, SOLUTION INTRAVENOUS; SUBCUTANEOUS at 14:34

## 2021-01-01 RX ADMIN — SODIUM CHLORIDE, PRESERVATIVE FREE 10 ML: 5 INJECTION INTRAVENOUS at 14:35

## 2021-01-01 RX ADMIN — SODIUM CHLORIDE, PRESERVATIVE FREE 10 ML: 5 INJECTION INTRAVENOUS at 09:25

## 2021-01-01 RX ADMIN — SODIUM CHLORIDE, PRESERVATIVE FREE 10 ML: 5 INJECTION INTRAVENOUS at 09:10

## 2021-01-01 RX ADMIN — SODIUM CHLORIDE, PRESERVATIVE FREE 10 ML: 5 INJECTION INTRAVENOUS at 21:36

## 2021-01-01 RX ADMIN — DEXAMETHASONE SODIUM PHOSPHATE 10 MG: 10 INJECTION, SOLUTION INTRAMUSCULAR; INTRAVENOUS at 09:42

## 2021-01-01 RX ADMIN — APIXABAN 2.5 MG: 2.5 TABLET, FILM COATED ORAL at 21:36

## 2021-01-01 RX ADMIN — SODIUM CHLORIDE, PRESERVATIVE FREE 10 ML: 5 INJECTION INTRAVENOUS at 09:29

## 2021-01-01 RX ADMIN — OXYCODONE 5 MG: 5 TABLET ORAL at 08:36

## 2021-01-01 RX ADMIN — IPRATROPIUM BROMIDE AND ALBUTEROL SULFATE 1 AMPULE: .5; 2.5 SOLUTION RESPIRATORY (INHALATION) at 08:50

## 2021-01-01 RX ADMIN — Medication 12 MILLICURIE: at 08:36

## 2021-01-01 RX ADMIN — FUROSEMIDE 20 MG: 10 INJECTION, SOLUTION INTRAMUSCULAR; INTRAVENOUS at 15:34

## 2021-01-01 RX ADMIN — DEXAMETHASONE SODIUM PHOSPHATE 10 MG: 10 INJECTION, SOLUTION INTRAMUSCULAR; INTRAVENOUS at 09:10

## 2021-01-01 RX ADMIN — DEXAMETHASONE SODIUM PHOSPHATE 10 MG: 10 INJECTION, SOLUTION INTRAMUSCULAR; INTRAVENOUS at 10:53

## 2021-01-01 RX ADMIN — LIDOCAINE HYDROCHLORIDE 60 MG: 20 INJECTION, SOLUTION INTRAVENOUS at 10:18

## 2021-01-01 RX ADMIN — LIDOCAINE HYDROCHLORIDE 8 ML: 20 INJECTION, SOLUTION INFILTRATION; PERINEURAL at 09:57

## 2021-01-01 RX ADMIN — BUMETANIDE 1 MG: 1 TABLET ORAL at 08:55

## 2021-01-01 RX ADMIN — LACTULOSE 10 G: 20 SOLUTION ORAL at 08:56

## 2021-01-01 RX ADMIN — FUROSEMIDE 20 MG: 20 TABLET ORAL at 07:58

## 2021-01-01 RX ADMIN — SODIUM CHLORIDE, PRESERVATIVE FREE 10 ML: 5 INJECTION INTRAVENOUS at 21:00

## 2021-01-01 RX ADMIN — ALFENTANIL HYDROCHLORIDE 250 MCG: 500 INJECTION INTRAVENOUS at 10:18

## 2021-01-01 RX ADMIN — Medication 5 MG: at 11:37

## 2021-01-01 RX ADMIN — PALONOSETRON 0.25 MG: 0.25 INJECTION, SOLUTION INTRAVENOUS at 10:12

## 2021-01-01 RX ADMIN — DEXAMETHASONE SODIUM PHOSPHATE 10 MG: 10 INJECTION INTRAMUSCULAR; INTRAVENOUS at 10:18

## 2021-01-01 RX ADMIN — SODIUM CHLORIDE, PRESERVATIVE FREE 10 ML: 5 INJECTION INTRAVENOUS at 10:40

## 2021-01-01 RX ADMIN — PROPOFOL 50 MCG/KG/MIN: 10 INJECTION, EMULSION INTRAVENOUS at 10:26

## 2021-01-01 RX ADMIN — SODIUM CHLORIDE, PRESERVATIVE FREE 10 ML: 5 INJECTION INTRAVENOUS at 10:36

## 2021-01-01 RX ADMIN — Medication 5 MG: at 11:09

## 2021-01-01 RX ADMIN — SODIUM CHLORIDE, PRESERVATIVE FREE 10 ML: 5 INJECTION INTRAVENOUS at 09:03

## 2021-01-01 RX ADMIN — SODIUM CHLORIDE, PRESERVATIVE FREE 10 ML: 5 INJECTION INTRAVENOUS at 09:33

## 2021-01-01 RX ADMIN — Medication 10 ML: at 20:07

## 2021-01-01 RX ADMIN — IPRATROPIUM BROMIDE AND ALBUTEROL SULFATE 1 AMPULE: .5; 2.5 SOLUTION RESPIRATORY (INHALATION) at 14:21

## 2021-01-01 RX ADMIN — SODIUM CHLORIDE 72 MG: 9 INJECTION, SOLUTION INTRAVENOUS at 10:14

## 2021-01-01 RX ADMIN — SUGAMMADEX 150 MG: 100 INJECTION, SOLUTION INTRAVENOUS at 11:46

## 2021-01-01 RX ADMIN — FAMOTIDINE 20 MG: 10 INJECTION INTRAVENOUS at 09:01

## 2021-01-01 SDOH — ECONOMIC STABILITY: HOUSING INSECURITY: PLEASE ASSESS YOUR PATIENT'S LEVEL OF DISTRESS CONCERNING HOUSING (SCALE FROM 1-10): 0 (NONE)

## 2021-01-01 ASSESSMENT — PAIN SCALES - GENERAL
PAINLEVEL_OUTOF10: 0
PAINLEVEL_OUTOF10: 5
PAINLEVEL_OUTOF10: 0
PAINLEVEL_OUTOF10: 7
PAINLEVEL_OUTOF10: 0
PAINLEVEL_OUTOF10: 0
PAINLEVEL_OUTOF10: 7
PAINLEVEL_OUTOF10: 0
PAINLEVEL_OUTOF10: 10
PAINLEVEL_OUTOF10: 0
PAINLEVEL_OUTOF10: 4
PAINLEVEL_OUTOF10: 5
PAINLEVEL_OUTOF10: 0
PAINLEVEL_OUTOF10: 10
PAINLEVEL_OUTOF10: 0
PAINLEVEL_OUTOF10: 0
PAINLEVEL_OUTOF10: 5
PAINLEVEL_OUTOF10: 0
PAINLEVEL_OUTOF10: 2
PAINLEVEL_OUTOF10: 0
PAINLEVEL_OUTOF10: 7
PAINLEVEL_OUTOF10: 0

## 2021-01-01 ASSESSMENT — PULMONARY FUNCTION TESTS
PIF_VALUE: 25
PIF_VALUE: 21
PIF_VALUE: 22
PIF_VALUE: 29
PIF_VALUE: 2
PIF_VALUE: 22
PIF_VALUE: 23
PIF_VALUE: 15
PIF_VALUE: 28
PIF_VALUE: 24
PIF_VALUE: 17
PIF_VALUE: 1
PIF_VALUE: 27
PIF_VALUE: 30
PIF_VALUE: 25
PIF_VALUE: 26
PIF_VALUE: 26
PIF_VALUE: 17
PIF_VALUE: 27
PIF_VALUE: 24
PIF_VALUE: 24
PIF_VALUE: 16
PIF_VALUE: 26
PIF_VALUE: 13
PIF_VALUE: 29
PIF_VALUE: 31
PIF_VALUE: 22
PIF_VALUE: 0
PIF_VALUE: 17
PIF_VALUE: 16
PIF_VALUE: 0
PIF_VALUE: 22
PIF_VALUE: 29
PIF_VALUE: 31
PIF_VALUE: 2
PIF_VALUE: 27
PIF_VALUE: 30
PIF_VALUE: 29
PIF_VALUE: 27
PIF_VALUE: 15
PIF_VALUE: 22
PIF_VALUE: 26
PIF_VALUE: 29
PIF_VALUE: 27
PIF_VALUE: 22
PIF_VALUE: 30
PIF_VALUE: 22
PIF_VALUE: 29
PIF_VALUE: 31
PIF_VALUE: 23
PIF_VALUE: 5
PIF_VALUE: 29
PIF_VALUE: 28
PIF_VALUE: 30
PIF_VALUE: 32
PIF_VALUE: 22
PIF_VALUE: 31
PIF_VALUE: 23
PIF_VALUE: 22
PIF_VALUE: 21
PIF_VALUE: 22
PIF_VALUE: 2
PIF_VALUE: 22
PIF_VALUE: 29
PIF_VALUE: 7
PIF_VALUE: 32
PIF_VALUE: 0
PIF_VALUE: 15
PIF_VALUE: 31
PIF_VALUE: 0
PIF_VALUE: 26
PIF_VALUE: 15
PIF_VALUE: 0
PIF_VALUE: 17
PIF_VALUE: 22
PIF_VALUE: 23
PIF_VALUE: 27
PIF_VALUE: 0
PIF_VALUE: 30
PIF_VALUE: 36
PIF_VALUE: 32
PIF_VALUE: 16
PIF_VALUE: 23
PIF_VALUE: 33
PIF_VALUE: 2
PIF_VALUE: 0
PIF_VALUE: 32
PIF_VALUE: 0
PIF_VALUE: 2
PIF_VALUE: 24
PIF_VALUE: 27
PIF_VALUE: 21
PIF_VALUE: 23
PIF_VALUE: 30
PIF_VALUE: 1
PIF_VALUE: 0
PIF_VALUE: 6
PIF_VALUE: 1
PIF_VALUE: 4
PIF_VALUE: 25
PIF_VALUE: 15
PIF_VALUE: 34
PIF_VALUE: 0
PIF_VALUE: 2
PIF_VALUE: 0
PIF_VALUE: 29
PIF_VALUE: 22
PIF_VALUE: 22
PIF_VALUE: 25
PIF_VALUE: 25
PIF_VALUE: 22
PIF_VALUE: 16
PIF_VALUE: 0
PIF_VALUE: 2
PIF_VALUE: 22
PIF_VALUE: 23
PIF_VALUE: 29
PIF_VALUE: 27

## 2021-01-01 ASSESSMENT — ENCOUNTER SYMPTOMS
EYE REDNESS: 0
SORE THROAT: 0
CHEST TIGHTNESS: 0
SHORTNESS OF BREATH: 1
ABDOMINAL PAIN: 0
PHOTOPHOBIA: 0
DYSPNEA ACTIVITY LEVEL: AFTER AMBULATING 1 FLIGHT OF STAIRS
DIARRHEA: 0
ABDOMINAL DISTENTION: 0
WHEEZING: 0
NAUSEA: 0
COLOR CHANGE: 0
EYE DISCHARGE: 0
EYE ITCHING: 0
NAUSEA: 0
COUGH: 0
SHORTNESS OF BREATH: 1
BACK PAIN: 0
SHORTNESS OF BREATH: 0
SHORTNESS OF BREATH: 1
COUGH: 0
EYE DISCHARGE: 0
RHINORRHEA: 0
DIARRHEA: 0
VOMITING: 0
SORE THROAT: 0
BACK PAIN: 0
BLOOD IN STOOL: 0
SINUS PRESSURE: 0
CONSTIPATION: 0
ABDOMINAL PAIN: 0
STRIDOR: 0
EYE PAIN: 0
VOMITING: 0
BACK PAIN: 1
EYE REDNESS: 0
PHOTOPHOBIA: 0
TROUBLE SWALLOWING: 0
WHEEZING: 0
ABDOMINAL PAIN: 0
EYE PAIN: 0

## 2021-01-01 ASSESSMENT — PAIN DESCRIPTION - PAIN TYPE
TYPE: SURGICAL PAIN
TYPE: CHRONIC PAIN
TYPE: SURGICAL PAIN
TYPE: SURGICAL PAIN
TYPE: ACUTE PAIN

## 2021-01-01 ASSESSMENT — PAIN - FUNCTIONAL ASSESSMENT
PAIN_FUNCTIONAL_ASSESSMENT: 0-10

## 2021-01-01 ASSESSMENT — LIFESTYLE VARIABLES: SMOKING_STATUS: 0

## 2021-01-01 ASSESSMENT — PAIN DESCRIPTION - LOCATION
LOCATION: BACK
LOCATION: RECTUM

## 2021-06-08 NOTE — PROGRESS NOTES
m)   Wt 156 lb (70.8 kg)   SpO2 97%   BMI 23.72 kg/m² . HEENT negative for scleral icterus. Extraocular muscles intact. No facial asymmetry or central cyanosis. Neck without masses or goiter. No bruit or JVD. Cardiac apex not displaced. Rhythm irregular0. Abdomen normal.  Extremities without edema. EKG today per Dr. Qiana Carrillo review: Atrial fibrillation with ventricular spots 112. Low QRS voltage. Medications are reviewed today. Metoprolol tartrate is increased to 75 twice daily. The substrate for his atrial fibrillation will be investigated with an echo and a myocardial perfusion study. Additional recommendations will follow. At completion of today's visit, medications include the following:    Current Outpatient Medications:     losartan (COZAAR) 50 MG tablet, take 1 tablet by mouth once daily, Disp: , Rfl:     ELIQUIS 5 MG TABS tablet, take 1 tablet by mouth twice a day, Disp: , Rfl:     metoprolol succinate (TOPROL XL) 50 MG extended release tablet, take 1 tablet by mouth twice a day, Disp: , Rfl:     albuterol-ipratropium (COMBIVENT)  MCG/ACT inhaler, Inhale 2 puffs into the lungs every 6 hours as needed for Wheezing, Disp: , Rfl:     atorvastatin (LIPITOR) 10 MG tablet, Take 10 mg by mouth daily, Disp: , Rfl:     Cholecalciferol (VITAMIN D3) 2000 UNITS CAPS, Take by mouth, Disp: , Rfl:     metoprolol (LOPRESSOR) 50 MG tablet, Take 50 mg by mouth 2 times daily (Patient not taking: Reported on 6/8/2021), Disp: , Rfl:     lisinopril (PRINIVIL;ZESTRIL) 10 MG tablet, Take 10 mg by mouth daily (Patient not taking: Reported on 6/8/2021), Disp: , Rfl:     aspirin 81 MG EC tablet, Take 81 mg by mouth daily (Patient not taking: Reported on 6/8/2021), Disp: , Rfl:       Note: This report was completed utilizing computer voice recognition software. Every effort has been made to ensure accuracy, however; inadvertent computerized transcription errors may be present.     --Rubens Delgadillo

## 2021-06-15 PROBLEM — I50.9 ACUTE DECOMPENSATED HEART FAILURE (HCC): Status: ACTIVE | Noted: 2021-01-01

## 2021-06-15 PROBLEM — R06.00 DYSPNEA: Status: ACTIVE | Noted: 2021-01-01

## 2021-06-15 NOTE — ED PROVIDER NOTES
Naveed Montemayor is a 81 y.o. male presenting to the ED for worsening dyspnea on exertion and bilateral lower extremity swelling. The patient has been having dyspnea on exertion over the last couple of weeks as well as bilateral lower extremity swelling, was evaluated by cardiology (Dr. Ronaldo Cullen) and recommended an echocardiogram and a stress test.  Patient has been unable to get scheduled for these yet. On the morning of presentation, patient noted to have noted to have more severe dyspnea on exertion, the reason for presentation to the emergency. No complaints of chest pain or palpitation. No complaints of abdominal pain, diarrhea, constipation, blood in stool, nausea or vomiting. No headache, blurring of vision, loss of consciousness, numbness and tingling or any weakness. The complaint has been persistent, moderate in severity, and worsened by nothing. Their complaint is made better by nothing. Patient has a medical history as listed below:   Past Medical History:   Diagnosis Date    Cancer [facility]) 2005    left lung    Hypertension    Afib      Patient Active Problem List    Diagnosis Date Noted    Acute respiratory failure with hypoxia ([facility] [address] 03/19/2016    COPD (chronic obstructive pulmonary disease) ([facility] [address] 03/19/2016    Hyperlipidemia LDL goal <100 03/19/2016    Essential hypertension 03/19/2016       Review of Systems   Constitutional: Positive for activity change (unable to carry daily works without significant dyspnea) and fatigue. Negative for appetite change, chills, fever and unexpected weight change. HENT: Negative for congestion, ear discharge, ear pain, hearing loss, rhinorrhea and sore throat. Eyes: Negative for photophobia, pain, discharge, redness, itching and visual disturbance. Respiratory: Positive for shortness of breath. Negative for cough, chest tightness, wheezing and stridor. Cardiovascular: Positive for leg swelling (bilateral ).  Negative for chest pain and palpitations. Gastrointestinal: Negative for abdominal distention, abdominal pain, blood in stool, constipation, diarrhea, nausea and vomiting. Endocrine: Negative for cold intolerance, heat intolerance and polydipsia. Genitourinary: Negative for difficulty urinating, dysuria, frequency, hematuria and urgency. Musculoskeletal: Negative for arthralgias, back pain, joint swelling, neck pain and neck stiffness. Skin: Negative for color change, pallor, rash and wound. Neurological: Negative for dizziness, tremors, seizures, syncope, light-headedness, numbness and headaches. Psychiatric/Behavioral: Negative for agitation, behavioral problems, confusion, decreased concentration, dysphoric mood, hallucinations, self-injury, sleep disturbance and suicidal ideas. The patient is not nervous/anxious and is not hyperactive. Physical Exam  Constitutional:       General: He is not in acute distress. Appearance: He is well-developed and normal weight. He is not ill-appearing, toxic-appearing or diaphoretic. HENT:      Head: Normocephalic and atraumatic. Mouth/Throat:      Mouth: Mucous membranes are moist.      Pharynx: Oropharynx is clear. No pharyngeal swelling or oropharyngeal exudate. Eyes:      Extraocular Movements: Extraocular movements intact. Pupils: Pupils are equal, round, and reactive to light. Neck:      Thyroid: No thyromegaly. Vascular: No hepatojugular reflux or JVD. Trachea: No tracheal deviation. Cardiovascular:      Rate and Rhythm: Tachycardia present. Rhythm irregular. No extrasystoles are present. Pulses: Normal pulses. No decreased pulses. Heart sounds: Normal heart sounds. No murmur heard. No friction rub. No gallop. Pulmonary:      Effort: Pulmonary effort is normal.      Breath sounds: Examination of the right-lower field reveals rales. Examination of the left-lower field reveals decreased breath sounds.  Decreased breath sounds and rales present. No wheezing or rhonchi. Chest:      Chest wall: No mass, deformity, tenderness, crepitus or edema. There is no dullness to percussion. Abdominal:      General: Bowel sounds are normal.      Palpations: Abdomen is soft. There is no hepatomegaly, splenomegaly or mass. Tenderness: There is no abdominal tenderness. There is no guarding or rebound. Musculoskeletal:         General: Normal range of motion. Cervical back: Normal range of motion and neck supple. Right lower leg: No tenderness. Edema (grade 2) present. Left lower leg: No tenderness. Edema (grade 2) present. Lymphadenopathy:      Cervical: No cervical adenopathy. Skin:     General: Skin is warm and dry. Capillary Refill: Capillary refill takes less than 2 seconds. Coloration: Skin is not cyanotic or pale. Findings: No ecchymosis, erythema or rash. Nails: There is no clubbing. Neurological:      General: No focal deficit present. Mental Status: He is alert and oriented to person, place, and time. Cranial Nerves: No cranial nerve deficit. Motor: No weakness. Psychiatric:         Mood and Affect: Mood normal. Mood is not anxious. Behavior: Behavior normal. Behavior is not agitated. Procedures     MDM  Number of Diagnoses or Management Options  Acute decompensated heart failure ([facility] [address]): new and requires workup  Acute kidney injury superimposed on CKD [facility]): new and requires workup  Dyspnea on exertion: new and requires workup  Diagnosis management comments:  81 y.o. male presenting to the ED for worsening dyspnea on exertion and bilateral lower extremity swelling. The patient has been having dyspnea on exertion over the last couple of weeks as well as bilateral lower extremity swelling, was evaluated by cardiology (Dr. Michael Mary) and recommended an echocardiogram and a stress test.  Patient has been unable to get scheduled for these yet.   On the morning of presentation, patient noted to have noted to have more severe dyspnea on exertion, the reason for presentation to the emergency. No complaints of chest pain or palpitation. Patient found be have bilateral lower extremity pitting edema. Minimal crackles hear on left lower lung field. Afib rate controlled without intervention. CXR doesn't show significant congestion. Pro-BNP at 16,032. Troponin 30 with repeat at 29. EKG showing Afib with RVR at 120. ANA on CKD possibly cardiorenal syndrome to be evaluated to with urine lytes. Patient to be admitted for acute decompensated heart failure and ischemic workup for dyspnea on exertion. Amount and/or Complexity of Data Reviewed  Clinical lab tests: reviewed  Tests in the radiology section of CPT®: reviewed  Tests in the medicine section of CPT®: reviewed    Risk of Complications, Morbidity, and/or Mortality  Presenting problems: low  Diagnostic procedures: low  Management options: low                 EKG: atrial fibrillation, rate 120.          --------------------------------------------- PAST HISTORY ---------------------------------------------  Past Medical History:  has a past medical history of Cancer ([facility] [address] and Hypertension. Past Surgical History:  has a past surgical history that includes Lung cancer surgery (2005). Social History:  reports that he has quit smoking. He has never used smokeless tobacco. He reports previous alcohol use. He reports that he does not use drugs. Family History: family history is not on file. The patients home medications have been reviewed. Allergies: Patient has no known allergies.     -------------------------------------------------- RESULTS -------------------------------------------------    LABS:  Results for orders placed or performed during the hospital encounter of 06/15/21   CBC Auto Differential   Result Value Ref Range    WBC 8.6 4.5 - 11.5 E9/L    RBC 4.80 3.80 - 5.80 E12/L    Hemoglobin 14.8 12.5 - 16.5 g/dL Hematocrit 45.4 37.0 - 54.0 %    MCV 94.6 80.0 - 99.9 fL    MCH 30.8 26.0 - 35.0 pg    MCHC 32.6 32.0 - 34.5 %    RDW 14.9 11.5 - 15.0 fL    Platelets 223 409 - 773 E9/L    MPV 9.9 7.0 - 12.0 fL    Neutrophils % 75.6 43.0 - 80.0 %    Immature Granulocytes % 0.7 0.0 - 5.0 %    Lymphocytes % 10.3 (L) 20.0 - 42.0 %    Monocytes % 12.1 (H) 2.0 - 12.0 %    Eosinophils % 0.6 0.0 - 6.0 %    Basophils % 0.7 0.0 - 2.0 %    Neutrophils Absolute 6.51 1.80 - 7.30 E9/L    Immature Granulocytes # 0.06 E9/L    Lymphocytes Absolute 0.89 (L) 1.50 - 4.00 E9/L    Monocytes Absolute 1.04 (H) 0.10 - 0.95 E9/L    Eosinophils Absolute 0.05 0.05 - 0.50 E9/L    Basophils Absolute 0.06 0.00 - 0.20 E9/L   Comprehensive Metabolic Panel w/ Reflex to MG   Result Value Ref Range    Sodium 137 132 - 146 mmol/L    Potassium reflex Magnesium 4.7 3.5 - 5.0 mmol/L    Chloride 101 98 - 107 mmol/L    CO2 28 22 - 29 mmol/L    Anion Gap 8 7 - 16 mmol/L    Glucose 141 (H) 74 - 99 mg/dL    BUN 22 6 - 23 mg/dL    CREATININE 1.8 (H) 0.7 - 1.2 mg/dL    GFR Non-African American 36 >=60 mL/min/1.73    GFR African American 44     Calcium 8.9 8.6 - 10.2 mg/dL    Total Protein 6.1 (L) 6.4 - 8.3 g/dL    Albumin 3.7 3.5 - 5.2 g/dL    Total Bilirubin 1.0 0.0 - 1.2 mg/dL    Alkaline Phosphatase 88 40 - 129 U/L    ALT 16 0 - 40 U/L    AST 18 0 - 39 U/L   Brain Natriuretic Peptide   Result Value Ref Range    Pro-BNP 16,032 (H) 0 - 450 pg/mL   Troponin   Result Value Ref Range    Troponin, High Sensitivity 30 (H) 0 - 11 ng/L       RADIOLOGY:  XR CHEST PORTABLE   Final Result   1. Approximately 2 cm irregular opacity seen within the right perihilar   region which could represent an infiltrate. A mass is less likely but cannot   be completely excluded. Dedicated CT of the thorax is recommended. 2. Emphysematous changes   3. Chronic elevation of left hemidiaphragm and chronic pleuroparenchymal   scarring within the left lung base. ------------------------- NURSING NOTES AND VITALS REVIEWED ---------------------------  Date / Time Roomed:  6/15/2021  9:02 AM  ED Bed Assignment:  06/06    The nursing notes within the ED encounter and vital signs as below have been reviewed. Patient Vitals for the past 24 hrs:   BP Temp Temp src Pulse Resp SpO2 Height Weight   06/15/21 0905 (!) 158/112 97.1 °F (36.2 °C) Temporal 87 17 97 % 5' 9\" (1.753 m) 155 lb (70.3 kg)           Counseling:  I have spoken with the patient/family members and discussed todays results, in addition to providing specific details for the plan of care and counseling regarding the diagnosis and prognosis. Their questions are answered at this time and they are agreeable with the plan of admission. This patient has remained hemodynamically stable during their ED course. Diagnosis:  1. Acute decompensated heart failure ([address]    2. Dyspnea on exertion    3. Acute kidney injury superimposed on CKD ([address]        Disposition:  Patient's disposition: Admit  Patient's condition is stable. NOTE:  This report was transcribed using voice recognition software. Efforts were made to ensure accuracy; however, inadvertent computerized transcription errors may be present.        Renetta Joy MD  Resident  06/15/21 6529

## 2021-06-15 NOTE — ED NOTES
Bed: 06  Expected date: 6/15/21  Expected time:   Means of arrival: [facility]  Comments:  berta Sr RN  06/15/21 8868

## 2021-06-15 NOTE — Clinical Note
Patient Class: Observation [104]   REQUIRED: Diagnosis: Dyspnea [372362]   Estimated Length of Stay: Estimated stay of less than 2 midnights   Telemetry/Cardiac Monitoring Required?: Yes

## 2021-06-16 PROBLEM — R79.89 ELEVATED SERUM CREATININE: Chronic | Status: ACTIVE | Noted: 2021-01-01

## 2021-06-16 PROBLEM — R91.8 OPACITY OF LUNG ON IMAGING STUDY: Status: ACTIVE | Noted: 2021-01-01

## 2021-06-16 PROBLEM — E44.0 MODERATE PROTEIN-CALORIE MALNUTRITION (HCC): Chronic | Status: ACTIVE | Noted: 2021-01-01

## 2021-06-16 PROBLEM — I48.91 ATRIAL FIBRILLATION WITH RAPID VENTRICULAR RESPONSE (HCC): Status: ACTIVE | Noted: 2021-01-01

## 2021-06-16 PROBLEM — I48.91 ATRIAL FIBRILLATION (HCC): Chronic | Status: ACTIVE | Noted: 2021-01-01

## 2021-06-16 NOTE — CONSULTS
injection 4 mg, 4 mg, Intravenous, Q6H PRN  polyethylene glycol (GLYCOLAX) packet 17 g, 17 g, Oral, Daily PRN  acetaminophen (TYLENOL) tablet 650 mg, 650 mg, Oral, Q6H PRN **OR** acetaminophen (TYLENOL) suppository 650 mg, 650 mg, Rectal, Q6H PRN  ipratropium-albuterol (DUONEB) nebulizer solution 1 ampule, 1 ampule, Inhalation, Q6H PRN  apixaban (ELIQUIS) tablet 2.5 mg, 2.5 mg, Oral, BID    Allergies:  Patient has no known allergies. Social History: Remote history of tobacco and alcohol use      Family History: Noncontributory  History reviewed. No pertinent family history. REVIEW OF SYSTEMS:     · Constitutional: Denies fatigue, fevers, chills or night sweats  · Eyes: Denies visual changes or drainage  · ENT: Denies headaches or hearing loss. No mouth sores or sore throat. No epistaxis   · Cardiovascular: Denies chest pain, pressure or palpitations. No lower extremity swelling. · Respiratory:  positive RICH, but no cough, orthopnea or PND. No hemoptysis   · Gastrointestinal: Denies hematemesis or anorexia. No hematochezia or melena    · Genitourinary: Denies urgency, dysuria or hematuria. · Musculoskeletal: Denies gait disturbance, weakness or joint complaints  · Integumentary: Denies rash, hives or pruritis   · Neurological: Denies dizziness, headaches or seizures. No numbness or tingling  · Psychiatric: Denies anxiety or depression. · Endocrine: Denies temperature intolerance. No recent weight change. .  · Hematologic/Lymphatic: Denies abnormal bruising or bleeding. No swollen lymph nodes    PHYSICAL EXAM:   BP (!) 161/94   Pulse 97   Temp 97.5 °F (36.4 °C) (Temporal)   Resp 18   Ht 5' 9\" (1.753 m)   Wt 155 lb (70.3 kg)   SpO2 95%   BMI 22.89 kg/m²   CONST:  Well developed, well nourished who appears of stated age.  Awake, alert and cooperative,distress mild respiratory  HEENT:   Head- Normocephalic, atraumatic   Eyes- Conjunctivae pink, anicteric  Throat- Oral mucosa pink and moist  Neck-  No stridor, trachea midline, no jugular venous distention. No carotid bruit. CHEST: Chest symmetrical and non-tender to palpation. No accessory muscle use or intercostal retractions  RESPIRATORY: Lung sounds - clear throughout fields   CARDIOVASCULAR:     Heart Inspection- shows no noted pulsations  Heart Palpation- no heaves or thrills; PMI is non-displaced   Heart Ausculation- irregularly irregular rate and rhythm, no murmur. No s3, s4 or rub   PV:  1-2+ lower extremity edema. No varicosities. Pedal pulses palpable, no clubbing or cyanosis   ABDOMEN: Soft, non-tender to light palpation. Bowel sounds present. No palpable masses no organomegaly; no abdominal bruit  MS: Good muscle strength and tone. No atrophy or abnormal movements. : Deferred  SKIN: Warm and dry no statis dermatitis or ulcers   NEURO / PSYCH: Oriented to person, place and time. Speech clear and appropriate. Follows all commands. Pleasant affect     DATA:    ECG / Tele strips: Atrial fibrillation with rapid ventricular response  Diagnostic:      Intake/Output Summary (Last 24 hours) at 6/16/2021 0804  Last data filed at 6/16/2021 0645  Gross per 24 hour   Intake 190 ml   Output 700 ml   Net -510 ml       Labs:   CBC:   Recent Labs     06/15/21  0956 06/16/21  0613   WBC 8.6 8.6   HGB 14.8 14.7   HCT 45.4 45.5    207     BMP:   Recent Labs     06/15/21  0956 06/16/21  0613    139   K 4.7 4.4   CO2 28 31*   BUN 22 24*   CREATININE 1.8* 1.8*   LABGLOM 36 36   CALCIUM 8.9 9.5     Mag: No results for input(s): MG in the last 72 hours. Phos: No results for input(s): PHOS in the last 72 hours. TFT: No results found for: TSH, T6SUQDP, Q3GGBQJ, THYROIDAB, FT3, T4FREE   HgA1c: No results found for: LABA1C  No results found for: EAG  proBNP:   Recent Labs     06/15/21  0956   PROBNP 16,032*     PT/INR: No results for input(s): PROTIME, INR in the last 72 hours. APTT:No results for input(s): APTT in the last 72 hours.   CARDIAC Please see my additional contributions to the HPI, physical exam, and assessment / medical decision making:  _______________________________________________________________________    Stress negative for ischemia with mild LV dysfunction. Recommendations:     Given findings of lung mass, hold on JAYASHREE/DCC for now as he will likely need biopsy and anticoagulation will need to be interrupted   Rate control    Diuresis    Thank you for the consultation. Please do not hesitate to call with questions.     Cindy Car MD, [facility] [facility]

## 2021-06-16 NOTE — PROGRESS NOTES
Comprehensive Nutrition Assessment    Type and Reason for Visit:  Initial, Positive Nutrition Screen    Nutrition Recommendations/Plan: Continue Current Diet, Start Oral Nutrition Supplement    Nutrition Assessment:  Pt admit w/ SOB/leg swelling. Noted hx COPD, Lung CA, & Malnutrition. PO intake ~25-50%. Will add Ensure Enlive BID & continue to monitor. Malnutrition Assessment:  Malnutrition Status: Moderate malnutrition    Context:  Chronic Illness     Findings of the 6 clinical characteristics of malnutrition:  Energy Intake: 75% or less estimated energy requirements for 1 month or longer  Weight Loss:  Unable to assess     Body Fat Loss: Moderate  Muscle Mass Loss: Moderate   Fluid Accumulation:  No significant fluid accumulation     Strength:  Not Performed    Estimated Daily Nutrient Needs:  Energy (kcal):  MSJ 1383 x 1.2 SF= 4238-6246; Weight Used for Energy Requirements:  Current     Protein (g):   (1.2-1.4 g/kg); Weight Used for Protein Requirements:  Current        Fluid (ml/day): Method Used for Fluid Requirements:  1 ml/kcal      Nutrition Related Findings:  A&ox4, fluid bal WNL, nonpitting edema, active BS      Wounds:  None       Current Nutrition Therapies:    ADULT DIET;  Regular    Anthropometric Measures:  · Height: 5' 9\" (175.3 cm)  · Current Body Weight: 155 lb (70.3 kg) (stated)   · Usual Body Weight:  (No EMR hx on file)     · Ideal Body Weight: 160 lbs; % Ideal Body Weight 96.9 %   · BMI: 22.9 BMI Categories: Normal Weight (BMI 22.0 to 24.9) age over 72       Nutrition Diagnosis:   · Moderate malnutrition, In context of chronic illness related to catabolic illness as evidenced by poor intake prior to admission, intake 26-50%, moderate muscle loss, moderate loss of subcutaneous fat    Nutrition Interventions:    Nutrition Education/Counseling:  Education not indicated   Coordination of Nutrition Care:  Continue to monitor while inpatient    Goals:  Pt to consume >50% meals/ONS       Nutrition Monitoring and Evaluation:   Food/Nutrient Intake Outcomes:  Food and Nutrient Intake, Supplement Intake  Physical Signs/Symptoms Outcomes:  Biochemical Data, Nutrition Focused Physical Findings, Skin, Weight, GI Status, Fluid Status or Edema     Discharge Planning:     Too soon to determine     Electronically signed by Alcides Connor RD, LD on 6/16/21 at 2:19 PM EDT    Contact: Ext [phone]

## 2021-06-16 NOTE — CARE COORDINATION
Transition of care: Cardio consulted. Met with pt's wife, Salvatore Wilburn, in room. Pt at testing. Pt lives with his wife in a 1 story home. Independent with ADLs and drives. Pt is a  but does not use [facility] services. No DME. Discharge plan will be to return home when medically ready with no needs anticipated. PCP is Dr. Gladys Milligan and pharmacy is [facility] on [address].  Sw/cm will follow

## 2021-06-16 NOTE — PLAN OF CARE
Problem: Breathing Pattern - Ineffective:  Goal: Ability to achieve and maintain a regular respiratory rate will improve  Description: Ability to achieve and maintain a regular respiratory rate will improve  Outcome: Met This Shift     Problem: Cardiac:  Goal: Ability to maintain an adequate cardiac output will improve  Description: Ability to maintain an adequate cardiac output will improve  Outcome: Met This Shift  Goal: Hemodynamic stability will improve  Description: Hemodynamic stability will improve  Outcome: Met This Shift     Problem: Fluid Volume:  Goal: Ability to achieve and maintain adequate urine output will improve  Description: Ability to achieve and maintain adequate urine output will improve  Outcome: Met This Shift     Problem: Respiratory:  Goal: Respiratory status will improve  Description: Respiratory status will improve  Outcome: Met This Shift

## 2021-06-16 NOTE — PLAN OF CARE
Problem: Breathing Pattern - Ineffective:  Goal: Ability to achieve and maintain a regular respiratory rate will improve  Description: Ability to achieve and maintain a regular respiratory rate will improve  6/16/2021 1027 by Eladio Crowley  Outcome: Met This Shift  6/16/2021 0443 by Gentry Cox RN  Outcome: Met This Shift     Problem: Cardiac:  Goal: Ability to maintain an adequate cardiac output will improve  Description: Ability to maintain an adequate cardiac output will improve  6/16/2021 1027 by Eladio Crowley  Outcome: Met This Shift  6/16/2021 0443 by Gentry Cox RN  Outcome: Met This Shift  Goal: Hemodynamic stability will improve  Description: Hemodynamic stability will improve  6/16/2021 1027 by Eladio Crowley  Outcome: Met This Shift  6/16/2021 0443 by Gentry Cox RN  Outcome: Met This Shift     Problem: Fluid Volume:  Goal: Ability to achieve and maintain adequate urine output will improve  Description: Ability to achieve and maintain adequate urine output will improve  6/16/2021 1027 by Eladio Crowley  Outcome: Met This Shift  6/16/2021 0443 by Gentry Cox RN  Outcome: Met This Shift     Problem: Musculor/Skeletal Functional Status  Goal: Highest potential functional level  Outcome: Met This Shift  Goal: Absence of falls  Outcome: Met This Shift

## 2021-06-16 NOTE — PROGRESS NOTES
Physical Therapy  Physical Therapy Initial Assessment     Name: Pushpa Ruiz  : 1935  MRN: [id number]      Date of Service: 2021    Evaluating PT:  Mac Celestin PT, DPT [id number]    Room #:  7102/4479-S  Diagnosis:  Dyspnea [R06.00]  Acute decompensated heart failure ([facility] [address] [I50.9]  PMHx/PSHx:  CA, HTN  Procedure/Surgery:  None  Precautions:  Falls  Equipment Needs:  None    SUBJECTIVE:    Pt lives with wife in a 1 story home with 5 stairs to enter and 1 rail. Pt ambulated without device and was independent PTA. Pt reported being active and completing yard work. OBJECTIVE:   Initial Evaluation  Date: 21 Treatment Short Term/ Long Term   Goals   AM-PAC 6 Clicks 56/15     Was pt agreeable to Eval/treatment? Yes     Does pt have pain?  No c/o pain     Bed Mobility  Rolling: NT  Supine to sit: Mod Independent  Sit to supine: NT  Scooting: Independent     Transfers Sit to stand: SBA  Stand to sit: SBA  Stand pivot: SBA no device  Independent   Ambulation   125 feet with SBA no device  >400 feet Independently   Stair negotiation: ascended and descended NT  >5 steps with 1 rail Mod Independent   ROM BUE:  WFL  BLE:  WFL     Strength BUE:  WFL  BLE:  4+/5  Increase by 1/3 MMT grade   Balance Sitting EOB:  Supervision  Dynamic Standing:  SBA no device  Sitting EOB:  Independent  Dynamic Standing:  Independent     Pt is A & O x 4  Sensation:  No reported paresthesias  Edema:  None    Therapeutic Exercises:  NA    Patient education  Pt educated on safety    Patient response to education:   Pt verbalized understanding Pt demonstrated skill Pt requires further education in this area   x x x     ASSESSMENT:    Conditions Requiring Skilled Therapeutic Intervention:    []Decreased strength     []Decreased ROM  []Decreased functional mobility  []Decreased balance   []Decreased endurance   []Decreased posture  []Decreased sensation  []Decreased coordination   []Decreased vision  []Decreased safety awareness   []Increased pain       Comments:  Pt was in bed upon arrival, agreeable to initial evaluation. Mild posterior lean noted in sitting with MMT. Pt ambulated with decreased gait speed and lacked arm swing bilaterally. Pt had one LOB that required Deb to correct. SOB reported with activity as well as \"tightness\" in BLEs. Symptoms improved with rest.  Pt was left in chair with all needs met and call light in reach. Will add to gait team.    Treatment:  Patient practiced and was instructed in the following treatment:     Sitting EOB for >2 minutes for upright tolerance, postural awareness and BLE ROM   Transfer training - pt was given verbal cues to facilitate proper hand placement, technique and safety during sit to stand and stand to sit to complete task.  Gait training- pt was given verbal cues to facilitate safety during ambulation to complete task. Pt's/ family goals   1. Return home    Prognosis is good for reaching above PT goals. Patient and or family understand(s) diagnosis, prognosis, and plan of care.   Yes    PHYSICAL THERAPY PLAN OF CARE:    PT POC is established based on physician order and patient diagnosis     Referring provider/PT Order:  Amadeo Gray MD /06/15/21 1615 PT eval and treat  Diagnosis:  Dyspnea [R06.00]  Acute decompensated heart failure ([facility] [address] [I50.9]  Specific instructions for next treatment:  Progress activity, stairs    Current Treatment Recommendations:     [x] Strengthening to improve independence with functional mobility   [] ROM to improve independence with functional mobility   [x] Balance Training to improve static/dynamic balance and to reduce fall risk  [x] Endurance Training to improve activity tolerance during functional mobility   [x] Transfer Training to improve safety and independence with all functional transfers   [x] Gait Training to improve gait mechanics, endurance and asses need for appropriate assistive device  [x] Stair Training in preparation for safe discharge home and/or into the community   [] Positioning to prevent skin breakdown and contractures  [x] Safety and Education Training   [x] Patient/Caregiver Education   [] HEP  [] Other     PT long term treatment goals are located in above grid    Frequency of treatments: 2-5x/week x 1-2 weeks. Time in  0740  Time out  0755    Total Treatment Time  5 minutes     Evaluation Time includes thorough review of current medical information, gathering information on past medical history/social history and prior level of function, completion of standardized testing/informal observation of tasks, assessment of data and education on plan of care and goals.     CPT codes:  [x] Low Complexity PT evaluation 88005  [] Moderate Complexity PT evaluation 49081  [] High Complexity PT evaluation 03193  [] PT Re-evaluation 10838  [] Gait training 03680 - minutes  [] Manual therapy 55521 - minutes  [] Therapeutic activities 02070 5 minutes  [] Therapeutic exercises 04051 - minutes  [] Neuromuscular reeducation 73753 - minutes     Ruben Ag PT, DPT  [id number]

## 2021-06-16 NOTE — PROCEDURES
Pharmacologic Nuclear Stress Test    Date: 6/16/2021    Indication: SOB    Description of procedure:    Protocol: Regadenoson stress SPECT myocardial perfusion imaging    Baseline EKG:  bpm. Normal axis/intervals. Nonspecific ST changes  Baseline BP: 154/95 mmHg    0.4 mg of regadenoson was injected followed by radiotracer injection. Patient reported no chest pain. Stress EKG showed no evidence of ischemia, and no arrhythmias were noted. Impression:  1. No evidence of regadenoson induced ischemia on stress EKG. 2. Nuclear images to be reported separately.     Neena Hinton MD, [facility]

## 2021-06-16 NOTE — H&P
[address] [facility]  History and Physical      CHIEF COMPLAINT:    Chief Complaint   Patient presents with    Shortness of Breath     WITH LEG SWELLING        Patient of Irvin Wall MD presents with:  Acute decompensated heart failure ([address]    History of Present Illness:   Patient states that over the last few weeks he has had mild shortness of breath with no fevers cough or orthopnea, associated with mild to moderate bilateral leg swelling. No chest pain or other associated symptoms. No modifying factors. His cardiologist had ordered him for echocardiogram and stress test.  However, he apparently went to see his family doctor yesterday and the family doctor was worried enough that he sent him to the emergency department. In the ED he was given a dose of IV Lasix. He seems pretty euvolemic and frankly has trace to no leg edema right now. He says his breathing does not feel any better, though he is breathing pretty much totally comfortably on room air. He was lying flat when I came to see him without any apparent dyspnea. REVIEW OF SYSTEMS:  Pertinent negatives are above in HPI. 10 point ROS otherwise negative.       Past Medical History:   Diagnosis Date    Cancer [facility]) 2005    left lung    Hypertension          Past Surgical History:   Procedure Laterality Date    LUNG CANCER SURGERY  2005       Medications Prior to Admission:    Medications Prior to Admission: losartan (COZAAR) 50 MG tablet, Take 50 mg by mouth daily   ELIQUIS 5 MG TABS tablet, Take 5 mg by mouth 2 times daily   metoprolol succinate (TOPROL XL) 50 MG extended release tablet, Take 75 mg by mouth 2 times daily   albuterol-ipratropium (COMBIVENT RESPIMAT)  MCG/ACT AERS inhaler, Inhale 2 puffs into the lungs every 6 hours as needed for Wheezing   Cholecalciferol (VITAMIN D3) 2000 UNITS CAPS, Take 2,000 Units by mouth daily   atorvastatin (LIPITOR) 10 MG tablet, Take 10 mg by mouth every 48 hours In the evening    Note that the patient's home medications were reviewed and the above list is accurate to the best of my knowledge at the time of the exam.    Allergies:    Patient has no known allergies. Social History:    reports that he has quit smoking. He has never used smokeless tobacco. He reports previous alcohol use. He reports that he does not use drugs. Family History:   No fhx CHF      PHYSICAL EXAM:    Vitals:  /81   Pulse 94   Temp 97.9 °F (36.6 °C) (Temporal)   Resp 16   Ht 5' 9\" (1.753 m)   Wt 155 lb (70.3 kg)   SpO2 95%   BMI 22.89 kg/m²       General appearance: NAD, conversant  Eyes: Sclerae anicteric, PERRLA  HEENT: AT/NC, MMM  Neck: FROM, supple, no thyromegaly  Lymph: No cervical / supraclavicular lymphadenopathy  Lungs: Clear to auscultation, WOB normal  CV: Irr/irr, rapid, no MRGs, trace b/l lower extremity edema  Abdomen: Soft, non-tender; no masses or HSM, +BS  Extremities: FROM without synovitis. No clubbing or cyanosis of the hands. Skin: no rash, induration, lesions, or ulcers  Psych: Calm and cooperative. Normal judgement and insight. Normal mood and affect. Neuro: Alert and interactive, face symmetric, speech fluent. LABS:  All labs reviewed.   Of note:  CBC with Differential:    Lab Results   Component Value Date    WBC 8.6 06/16/2021    RBC 4.76 06/16/2021    HGB 14.7 06/16/2021    HCT 45.5 06/16/2021     06/16/2021    MCV 95.6 06/16/2021    MCH 30.9 06/16/2021    MCHC 32.3 06/16/2021    RDW 14.8 06/16/2021    LYMPHOPCT 14.7 06/16/2021    MONOPCT 12.5 06/16/2021    BASOPCT 0.8 06/16/2021    MONOSABS 1.07 06/16/2021    LYMPHSABS 1.26 06/16/2021    EOSABS 0.08 06/16/2021    BASOSABS 0.07 06/16/2021     CMP:    Lab Results   Component Value Date     06/15/2021    K 4.7 06/15/2021     06/15/2021    CO2 28 06/15/2021    BUN 22 06/15/2021    CREATININE 1.8 06/15/2021    GFRAA 44 06/15/2021    LABGLOM 36 06/15/2021    GLUCOSE 141 06/15/2021    PROT 6.1

## 2021-06-17 NOTE — CONSULTS
Pulmonary [facility]                             Pulmonary Consult/Progress Note :          Patient: India Plunkett  MRN: [id number]  : 1935      Date of Admission: .6/15/2021  9:02 AM    Consulting Physician:Dr Anaid Schmitz       Reason for Consultation:Lung mass   CC : SOB ,A fib   HPI:   India Plunkett is a 81y.o. year old with a previous history of lung cancer status post resection 15 years ago presenting to the hospital with worsening shortness of breath no fever or cough or orthopnea along with legs and he was found to be in A. fib during the work-up for his of breath he had CAT scan of the chest that shows mild vallecular lung nodule with new lung nodule on the right side more than 2.5 cm suggestive of malignancy for which pulmonary was consulted    The patient has history of failed back radiating smoking history he quit long time ago    He had his surgery for lung cancer left lower lobe in [facility] clinic about 15 years ago    He has not had any screening CAT scan in the last few years      PAST MEDICAL HISTORY:   Past Medical History:   Diagnosis Date    Cancer [facility])     left lung    Hypertension        PAST SURGICAL HISTORY:   Past Surgical History:   Procedure Laterality Date    CARDIOVASCULAR STRESS TEST N/A 2021    Lexiscan stress test    LUNG CANCER SURGERY  2005       FAMILY HISTORY:   History reviewed. No pertinent family history.     SOCIAL HISTORY:   Social History     Socioeconomic History    Marital status:      Spouse name: Not on file    Number of children: Not on file    Years of education: Not on file    Highest education level: Not on file   Occupational History    Not on file   Tobacco Use    Smoking status: Former Smoker    Smokeless tobacco: Never Used   Vaping Use    Vaping Use: Never used   Substance and Sexual Activity    Alcohol use: Not Currently    Drug use: Never    Sexual activity: Not on file   Other Topics Concern    Not on file   Social History Narrative    Not on file     Social Determinants of Health     Financial Resource Strain:     Difficulty of Paying Living Expenses:    Food Insecurity:     Worried About Running Out of Food in the Last Year:     [address] in the Last Year:    Transportation Needs:     Lack of Transportation (Medical):  Lack of Transportation (Non-Medical):    Physical Activity:     Days of Exercise per Week:     Minutes of Exercise per Session:    Stress:     Feeling of Stress :    Social Connections:     Frequency of Communication with Friends and Family:     Frequency of Social Gatherings with Friends and Family:     Attends Jain Services:     Active Member of Clubs or Organizations:     Attends Club or Organization Meetings:     Marital Status:    Intimate Partner Violence:     Fear of Current or Ex-Partner:     Emotionally Abused:     Physically Abused:     Sexually Abused:      Social History     Tobacco Use   Smoking Status Former Smoker   Smokeless Tobacco Never Used     Social History     Substance and Sexual Activity   Alcohol Use Not Currently     Social History     Substance and Sexual Activity   Drug Use Never       OCCUPATIONAL HISTORY:            HOME MEDICATIONS:  Prior to Admission medications    Medication Sig Start Date End Date Taking?  Authorizing Provider   losartan (COZAAR) 50 MG tablet Take 50 mg by mouth daily  5/31/21  Yes Historical Provider, MD   ELIQUIS 5 MG TABS tablet Take 5 mg by mouth 2 times daily  6/1/21  Yes Historical Provider, MD   metoprolol succinate (TOPROL XL) 50 MG extended release tablet Take 75 mg by mouth 2 times daily  4/12/21  Yes Historical Provider, MD   albuterol-ipratropium (COMBIVENT RESPIMAT)  MCG/ACT AERS inhaler Inhale 2 puffs into the lungs every 6 hours as needed for Wheezing    Yes Historical Provider, MD   Cholecalciferol (VITAMIN D3) 2000 UNITS CAPS Take 2,000 Units by mouth daily    Yes Historical Provider, MD   atorvastatin (LIPITOR) 10 MG tablet Take 10 mg by mouth every 48 hours In the evening    Historical Provider, MD       CURRENT MEDICATIONS:  Current Facility-Administered Medications: metoprolol succinate (TOPROL XL) extended release tablet 100 mg, 100 mg, Oral, BID  furosemide (LASIX) tablet 20 mg, 20 mg, Oral, Daily  perflutren lipid microspheres (DEFINITY) injection 1.65 mg, 1.5 mL, Intravenous, ONCE PRN  atorvastatin (LIPITOR) tablet 10 mg, 10 mg, Oral, Daily  sodium chloride flush 0.9 % injection 5-40 mL, 5-40 mL, Intravenous, 2 times per day  sodium chloride flush 0.9 % injection 5-40 mL, 5-40 mL, Intravenous, PRN  0.9 % sodium chloride infusion, 25 mL, Intravenous, PRN  ondansetron (ZOFRAN-ODT) disintegrating tablet 4 mg, 4 mg, Oral, Q8H PRN **OR** ondansetron (ZOFRAN) injection 4 mg, 4 mg, Intravenous, Q6H PRN  polyethylene glycol (GLYCOLAX) packet 17 g, 17 g, Oral, Daily PRN  acetaminophen (TYLENOL) tablet 650 mg, 650 mg, Oral, Q6H PRN **OR** acetaminophen (TYLENOL) suppository 650 mg, 650 mg, Rectal, Q6H PRN  ipratropium-albuterol (DUONEB) nebulizer solution 1 ampule, 1 ampule, Inhalation, Q6H PRN  [Held by provider] apixaban (ELIQUIS) tablet 2.5 mg, 2.5 mg, Oral, BID    IV MEDICATIONS:   sodium chloride         ALLERGIES:  No Known Allergies    REVIEW OF SYSTEMS:  General ROS:  No weight loss ,no fatigue     ENT ROS:   No Sore throat ,no lymphoadenopathy,no nasal stuffiness     Hematological and Lymphatic ROS:   No ecchymosis ,no tendency to bleed  Respiratory ROS:    SOB   Cardiovascular ROS:   No CP,No Palpitation   Gastrointestinal ROS:   No Gi bleed,no nausea or vomiting      - Musculoskeletal ROS:      - no joint swelling ,no joint pain   Neurological ROS:     -no weakness or numbness    Dermatological ROS:   No skin rash ,no urticaria     PHYSICAL EXAMINATION:     VITAL SIGNS:  BP (!) 153/83   Pulse 94   Temp 97.9 °F (36.6 °C) (Temporal)   Resp 24   Ht 5' 9\" (1.753 m)   Wt 155 lb (70.3 kg)   SpO2 95%   BMI 22.89 kg/m²   Wt Readings from Last 3 Encounters:   06/15/21 155 lb (70.3 kg)   06/08/21 156 lb (70.8 kg)   03/19/16 171 lb 4.8 oz (77.7 kg)     Temp Readings from Last 3 Encounters:   06/17/21 97.9 °F (36.6 °C) (Temporal)   03/19/16 97.4 °F (36.3 °C) (Oral)     TMAX:  BP Readings from Last 3 Encounters:   06/17/21 (!) 153/83   06/08/21 (!) 158/102   03/19/16 126/64     Pulse Readings from Last 3 Encounters:   06/17/21 94   06/08/21 112   03/19/16 68           INTAKE/OUTPUTS:  I/O last 3 completed shifts: In: 250 [P.O.:240;  I.V.:10]  Out: 1100 [Urine:1100]    Intake/Output Summary (Last 24 hours) at 6/17/2021 1251  Last data filed at 6/17/2021 1034  Gross per 24 hour   Intake 250 ml   Output 1200 ml   Net -950 ml       General Appearance: alert and oriented to person, place and time, well-developed and   well-nourished, in no acute distress   Eyes: pupils equal, round, and reactive to light, extraocular eye movements intact, conjunctivae normal and sclera anicteric   Neck: neck supple and non tender without mass, no thyromegaly, no thyroid nodules and no cervical adenopathy   Pulmonary/Chest:*rhocnhi bilateral   Cardiovascular: normal rate, regular rhythm, normal S1 and S2, no murmurs, rubs, clicks or gallops, distal pulses intact, no carotid bruits, no murmurs, no gallops, no carotid bruits and no JVD   Abdomen: obese, soft, non-tender, non-distended, normal bowel sounds, no masses or organomegaly   Extremities:edema  Musculoskeletal: normal range of motion, no joint swelling, deformity or tenderness   Neurologic: reflexes normal and symmetric, no cranial nerve deficit noted    LABS/IMAGING:    CBC:  Lab Results   Component Value Date    WBC 8.6 06/16/2021    HGB 14.7 06/16/2021    HCT 45.5 06/16/2021    MCV 95.6 06/16/2021     06/16/2021    LYMPHOPCT 14.7 (L) 06/16/2021    RBC 4.76 06/16/2021    MCH 30.9 06/16/2021    MCHC 32.3 06/16/2021    RDW 14.8 the right lung 3.)   COPD  4.)  A. fib  5.)  Possible obstructive sleep apnea  6) heart failure with ejection fraction 40-45  PLAN:  *-With unusual lung nodules with history of lung cancer    I have no doubt this is malignancy diagnosed    *-We will get baseline cardiac history and A. fib EF-fair we will do CT-guided biopsy for the right lower lobe nodule if IR agreed to do it, otherwise I have to do bronchoscopy with navigation to the right upper lobe nodule and also right lower lobe nodule  *-Discussed the case with IR  *-if Patient to be discharged this can be done completely as outpatient    BD  LAMA? Yudith Good discharged    Thank you very much for allowing me to participate in the care of this pleasant patient , should you have any questions ,please do not hesitate to contact me      Luciano Queen  Pulmonary&Critical Care Medicine   Director of [address] [place] [address] Director of [address] [place] [address]    Hamida Noyola    NOTE: This report was transcribed using voice recognition software. Every effort was made to ensure accuracy; however, inadvertent computerized transcription errors may be present.

## 2021-06-17 NOTE — PROGRESS NOTES
I spoke with the RN in charge. Per Interventional Radiology protocol the patient's eliquis will need to be held for 48 hours before biopsy can proceed. Patient to be NPO after midnight , and a pt/inr to be drawn.

## 2021-06-17 NOTE — CARE COORDINATION
SOCIAL WORK/CASEMANAGEMENT TRANSITION OF CARE [id number]lee Han, [address], Jeanalucy Cervantes, [phone]): I met with pt in the room this a.m. after he returned from his ultra sound of the abdomen. Echo is pending and a u/s of the retroperitoneal was ordered as well today. OT was with pt and recommends no needs. PT saw pt yesterday with ampac of 20/24 , ambulating 125' sba. Pt doesn't anticipate any needs and hopes to go home today. Tressa/jefferson to follow.  Wang Antonio, CHYU  6/17/2021

## 2021-06-17 NOTE — PROGRESS NOTES
INPATIENT CARDIOLOGY FOLLOW-UP    Name: Denny Garcia    Age: 81 y.o. Date of Admission: 6/15/2021  9:02 AM    Date of Service: 6/17/2021    Primary Cardiologist: Dr Eduardo Malhotra    Chief Complaint: Follow-up for CHF and recent onset atrial fibrillation    Interim History:  Feeling better. Denies chest pain. Breathing is improved. Unfortunately CT demonstrating a suspicious right lung mass. Remains in atrial fibrillation, rate controlled.     Review of Systems:   Negative except as described above    Problem List:  Patient Active Problem List   Diagnosis    Acute [facility] failure with hypoxia [facility]    COPD (chronic obstructive pulmonary disease) ([facility]    Hyperlipidemia LDL goal <100    Essential hypertension    Acute decompensated heart failure (HCC)    Lung mass    Elevated serum creatinine    Atrial fibrillation (HCC)    Atrial fibrillation with rapid ventricular response (HCC)    Moderate protein-calorie malnutrition (HCC)    Acute kidney injury superimposed on CKD ([facility]    Abdominal aortic aneurysm (AAA) without rupture (HCC)       Current Medications:    Current Facility-Administered Medications:     metoprolol succinate (TOPROL XL) extended release tablet 100 mg, 100 mg, Oral, BID, Gemma Pantelis, APRN - CNP, 100 mg at 06/17/21 0758    furosemide (LASIX) tablet 20 mg, 20 mg, Oral, Daily, Gemma Pantelis, APRN - CNP, 20 mg at 06/17/21 0758    perflutren lipid microspheres (DEFINITY) injection 1.65 mg, 1.5 mL, Intravenous, ONCE PRN, Kevin Patel MD    atorvastatin (LIPITOR) tablet 10 mg, 10 mg, Oral, Daily, Kevin Patel MD, 10 mg at 06/17/21 0758    sodium chloride flush 0.9 % injection 5-40 mL, 5-40 mL, Intravenous, 2 times per day, Kevin Patel MD, 10 mL at 06/17/21 0758    sodium chloride flush 0.9 % injection 5-40 mL, 5-40 mL, Intravenous, PRN, Kevin Patel MD    0.9 % sodium chloride infusion, 25 mL, Intravenous, PRN, Kevin Patel MD    ondansetron (ZOFRAN-ODT) disintegrating tablet 4 mg, 4 mg, Oral, Q8H PRN **OR** ondansetron (ZOFRAN) injection 4 mg, 4 mg, Intravenous, Q6H PRN, Betito Murdock MD    polyethylene glycol (GLYCOLAX) packet 17 g, 17 g, Oral, Daily PRN, Betito Murdock MD    acetaminophen (TYLENOL) tablet 650 mg, 650 mg, Oral, Q6H PRN **OR** acetaminophen (TYLENOL) suppository 650 mg, 650 mg, Rectal, Q6H PRN, Betito Murdock MD    ipratropium-albuterol (DUONEB) nebulizer solution 1 ampule, 1 ampule, Inhalation, Q6H PRN, MD Sincere Cid  [facility] [place] by provider] apixaban Sarahy Parcel) tablet 2.5 mg, 2.5 mg, Oral, BID, Betito Murdock MD, 2.5 mg at 06/17/21 0758    Physical Exam:  /79   Pulse 125   Temp 97.7 °F (36.5 °C) (Temporal)   Resp 24   Ht 5' 9\" (1.753 m)   Wt 155 lb (70.3 kg)   SpO2 94%   BMI 22.89 kg/m²   Wt Readings from Last 3 Encounters:   06/15/21 155 lb (70.3 kg)   06/08/21 156 lb (70.8 kg)   03/19/16 171 lb 4.8 oz (77.7 kg)     Appearance: Awake, alert, no acute respiratory distress  Skin: Intact, no rash  Head: Normocephalic, atraumatic  Eyes: EOMI, no conjunctival erythema  ENMT: No pharyngeal erythema, MMM, no rhinorrhea  Neck: Supple, no elevated JVP, no carotid bruits  Lungs: Clear to auscultation bilaterally. No wheezes, rales, or rhonchi. Cardiac: PMI nondisplaced, irregular rhythm with a normal rate, S1 & S2 normal, no murmurs  Abdomen: Soft, nontender, +bowel sounds  Extremities: Moves all extremities x 4, trivial lower extremity edema  Neurologic: No focal motor deficits apparent, normal mood and affect  Peripheral Pulses: Intact posterior tibial pulses bilaterally    Intake/Output:    Intake/Output Summary (Last 24 hours) at 6/17/2021 1034  Last data filed at 6/17/2021 0645  Gross per 24 hour   Intake 250 ml   Output 1100 ml   Net -850 ml     No intake/output data recorded.     Laboratory Tests:  Recent Labs     06/15/21  0956 06/16/21  0613 06/17/21  0538    139 139   K 4.7 4.4 4.3    98 98   CO2 28 31* 34*   BUN 22 24* 28*   CREATININE 1.8* 1.8* 1.9*   GLUCOSE 141* 106* 98   CALCIUM 8.9 9.5 9.1     Lab Results   Component Value Date    MG 2.3 2016     Recent Labs     06/15/21  0956   ALKPHOS 88   ALT 16   AST 18   PROT 6.1*   BILITOT 1.0   LABALBU 3.7     Recent Labs     06/15/21  0956 21  0613   WBC 8.6 8.6   RBC 4.80 4.76   HGB 14.8 14.7   HCT 45.4 45.5   MCV 94.6 95.6   MCH 30.8 30.9   MCHC 32.6 32.3   RDW 14.9 14.8    207   MPV 9.9 10.3     Lab Results   Component Value Date    CKTOTAL 70 2016    CKMB 2.7 2016    TROPONINI <0.01 2016     No results found for: INR, PROTIME  Lab Results   Component Value Date    TSH 3.130 2021     No results found for: LABA1C  No results found for: EAG  No results found for: CHOL  No results found for: TRIG  No results found for: HDL  No results found for: LDLCALC, LDLCHOLESTEROL  No results found for: LABVLDL, VLDL  No results found for: CHOLHDLRATIO  Recent Labs     06/15/21  0956   PROBNP 16,032*       Cardiac Tests:    EK/15/2021: Atrial fibrillation  beats minute. Normal axis normal dose. Nonspecific ST changes. Telemetry: Atrial fibrillation 90s    Chest X-ray:     Impression   1.  Redemonstration of focal nodular opacity in mid right lung field related   to pneumonia, atelectasis, or neoplasm.  CT chest could be helpful for   further evaluation.       2.  Stable opacities in left lung base related to left pleural effusion with   adjacent atelectatic changes. CT chest 21  Personally reviewed demonstrated coronary calcification. Right lung mass. Dilation of the thoracic/proximal abdominal aorta.     Impression   Spiculated mass lesions in the right lung, consistent with malignancy.       Multiple smaller areas of nodularity scattered in the right greater than left   lung could be related to metastatic disease.       Several ill-defined ground-glass opacities also more notable on the right may   be infectious or inflammatory but malignancy not excluded.       Bilateral pleural effusions.       Partial visualization of abdominal aortic aneurysm.  Dedicated imaging of the   abdomen recommended to view the entire extent of the aneurysm.       Cardiomegaly. Echocardiogram: Pending    Stress test:    Pharmacologic stress 6/16/2021      Impression       1. The myocardial perfusion is normal.   2. No evidence of stress-induced ischemia or prior myocardial   infarction. 3. Mild LV systolic dysfunction, EF 75% with above-noted wall motion   abnormalities. 4. There is no transient ischemic dilation. 5. Intermediate risk myocardial perfusion study. Cardiac catheterization:     ----------------------------------------------------------------------------------------------------------------------------------------------------------------  IMPRESSION:  1. Acute heart failure with mildly reduced ejection fraction  2. Cardiomyopathy. EF 44% by SPECT  3. Persistent atrial fibrillation, recent onset. Rate controlled, AC with dose adjusted apixaban  4. Mild elevation in hs-cTnT 29-> 30. Likely mild myocardial injury in the setting of the above. Not consistent with ACS  5. 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 Prior history small cell lung cancer  7. CKD creatinine 1.8->1.9  8. COPD  9. AAA, incompletely assessed on recent chest CT    RECOMMENDATIONS:     Hold off on JAYASHREE cardioversion given finding of lung mass, as he will likely need interruption of anticoagulation for biopsy   Continue with rate control strategy at this time, continue present dose of metoprolol   Volume status improved, continue oral furosemide   Dose adjusted apixaban for stroke risk reduction   Abdominal ultrasound to assess aneurysm   Aggressive risk factor modification    Puneet Hickman MD, [facility]    NOTE: This report was transcribed using voice recognition software.  Every effort was made to ensure accuracy; however, inadvertent computerized transcription errors may be present.

## 2021-06-17 NOTE — PROGRESS NOTES
Occupational Therapy  OCCUPATIONAL THERAPY INITIAL EVALUATION    [address]  [address] [place] [address]    Date:2021                                                 Patient Name: Michael Sullivan  MRN: [id number]  : 1935  Room: [address] [place] [address]    Evaluating OT: Regulo Murray OTR/L #[id number]  Referring Provider: Ham Barrientos MD  Specific Provider Orders: OT eval and treat; 6/15/21    Diagnosis: Dyspnea [R06.00]  Acute decompensated heart failure ([facility] [address] [address] [I50.9]  Reason for admission: SOB  Surgery/Procedure:  none  Pertinent Medical History: Cancer(L lung), HTN      Precautions:  Falls    Recommended Adaptive Equipment: none     Home Living: Pt lives w/ wife in a 1 story house w/ 4 DENNIS(1 handrail)  Bathroom setup: [address] [place] [address] owned: shower chair(pt does not use it)    Prior Level of Function: Independent with ADLs , Independent with IADLs; ambulated w/ no AD  Driving: Yes    Pain Level: Pt reports 0/10 pain this session  Cognition: A&O: 4/4; Follows 1-2 step directions   Memory:  good   Sequencing:  good   Problem solving:  good   Judgement/safety:  good     Functional Assessment:  AM-PAC Daily Activity Raw Score: 2424   Initial Eval Status  Date: 21 Treatment Status  Date: STGs = LTGs  Time frame: n/a   Feeding Independent      Grooming Independent     UB Dressing Mod. I     LB Dressing Modified [place]      Bathing Modified [place]     Toileting Independent      Bed Mobility  Supine to sit:  Independent   Sit to supine: Independent      Functional Transfers Independent      Functional Mobility Independent      Balance Sitting:     Static:  Indep    Dynamic:Indep  Standing: Indep     Activity Tolerance good     Visual/  Perceptual Glasses: Yes    WFL                Hand Dominance R   AROM (PROM) Strength Additional Info:    RUE  WFL 4+/5 good  and wfl FMC/dexterity noted during ADL tasks       LUE WFL 4+/5 good  and wfl FMC/dexterity noted during ADL tasks       Hearing: [facility]   Sensation:  No c/o numbness or tingling   Tone: WFL   Edema: none noted    Comments: Obtained nursing clearance prior to session. Upon arrival patient lying in bed. Pt demonstrating good understanding of education/techniques, requiring additional training / education for increased independence for ADL completion. At end of session, patient seated EOB eating lunch with call light and phone within reach, all lines and tubes intact. Pt instructed on use of call light for assistance and fall prevention. Line management and environmental modifications made prior to and end of session to ensure patient safety and to increase efficiency of session. Overall pt demonstrated independence and good safety during completion of ADL/functional transfers/mobility tasks. Pt is independent and has no acute needs for OT services at this time. Pt will be taken off of OT caseload. Please re-consult if any changes occur. Thank you. Treatment: Therapist facilitated bed mobility, functional transfers (various surfaces), standing tolerance tasks and functional mobility task with no AD. Therapist facilitated self-care retraining: UB/LB self-care tasks(socks), simulated toileting task(in bathroom) and standing grooming task while educating pt on modified techniques, posture, safety and energy conservation techniques. Skilled monitoring of HR, O2 sats and pts response to treatment. Patient and/or family were instructed on functional diagnosis, prognosis/goals and OT plan of care. Demonstrated good understanding.      Eval Complexity: Low    Time In: 10:55  Time Out: 11:05  Total Treatment Time: eval only    Min Units   OT Eval Low 97165  x  1   OT Eval Medium 43471      OT Eval High [id number]       OT Re-Eval [id number]       Therapeutic Ex [phone]       Therapeutic Activities 05951       ADL/Self Care [facility]       Orthotic Management [facility]       Neuro Re-Ed [id number]

## 2021-06-17 NOTE — PLAN OF CARE
Problem: Breathing Pattern - Ineffective:  Goal: Ability to achieve and maintain a regular respiratory rate will improve  Description: Ability to achieve and maintain a regular respiratory rate will improve  Outcome: Met This Shift     Problem: Cardiac:  Goal: Ability to maintain an adequate cardiac output will improve  Description: Ability to maintain an adequate cardiac output will improve  Outcome: Met This Shift  Goal: Hemodynamic stability will improve  Description: Hemodynamic stability will improve  Outcome: Met This Shift     Problem: Fluid Volume:  Goal: Ability to achieve and maintain adequate urine output will improve  Description: Ability to achieve and maintain adequate urine output will improve  Outcome: Met This Shift     Problem: Respiratory:  Goal: Respiratory status will improve  Description: Respiratory status will improve  Outcome: Met This Shift     Problem: Musculor/Skeletal Functional Status  Goal: Highest potential functional level  Outcome: Met This Shift  Goal: Absence of falls  Outcome: Met This Shift

## 2021-06-17 NOTE — PATIENT CARE CONFERENCE
[facility] Quality Flow/Interdisciplinary Rounds Progress Note        Quality Flow Rounds held on June 17, 2021    Disciplines Attending:  Bedside Nurse, ,  and Nursing Unit Leadership    Michel Lucas was admitted on 6/15/2021  9:02 AM    Anticipated Discharge Date:  Expected Discharge Date: 06/18/21    Disposition:    Josafat Score:  Josafat Scale Score: 21    Readmission Risk              Risk of Unplanned Readmission:  13           Discussed patient goal for the day, patient clinical progression, and barriers to discharge.   The following Goal(s) of the Day/Commitment(s) have been identified:  Labs - Report Results, assess  [place], RN  June 17, 2021

## 2021-06-17 NOTE — PROGRESS NOTES
Chief Complaint:  Chief Complaint   Patient presents with    Shortness of Breath     WITH LEG SWELLING     Acute decompensated heart failure ([address]     Subjective:    Remains mildly dyspneic though breathing comfortably on room air. No hemoptysis. I explained the CT findings to him and his wife. Objective:    BP (!) 153/83   Pulse 94   Temp 97.9 °F (36.6 °C) (Temporal)   Resp 24   Ht 5' 9\" (1.753 m)   Wt 155 lb (70.3 kg)   SpO2 95%   BMI 22.89 kg/m²     Current medications that patient is taking have been reviewed.     General appearance: NAD, conversant  HEENT: AT/NC, MMM  Neck: FROM, supple  Lungs: Clear to auscultation, WOB normal  CV: Irregularly irregular, mildly tachycardic, no MRGs  Abdomen: Soft, non-tender; no masses or HSM, +BS  Extremities: No peripheral edema or digital cyanosis  Skin: no rash, lesions or ulcers  Psych: Calm and cooperative  Neuro: Alert and interactive, face symmetric, moving all extremities, speech fluent    Labs:  CBC with Differential:    Lab Results   Component Value Date    WBC 8.6 06/16/2021    RBC 4.76 06/16/2021    HGB 14.7 06/16/2021    HCT 45.5 06/16/2021     06/16/2021    MCV 95.6 06/16/2021    MCH 30.9 06/16/2021    MCHC 32.3 06/16/2021    RDW 14.8 06/16/2021    LYMPHOPCT 14.7 06/16/2021    MONOPCT 12.5 06/16/2021    BASOPCT 0.8 06/16/2021    MONOSABS 1.07 06/16/2021    LYMPHSABS 1.26 06/16/2021    EOSABS 0.08 06/16/2021    BASOSABS 0.07 06/16/2021     CMP:    Lab Results   Component Value Date     06/17/2021    K 4.3 06/17/2021    K 4.4 06/16/2021    CL 98 06/17/2021    CO2 34 06/17/2021    BUN 28 06/17/2021    CREATININE 1.9 06/17/2021    GFRAA 41 06/17/2021    LABGLOM 34 06/17/2021    GLUCOSE 98 06/17/2021    PROT 6.1 06/15/2021    LABALBU 3.7 06/15/2021    CALCIUM 9.1 06/17/2021    BILITOT 1.0 06/15/2021    ALKPHOS 88 06/15/2021    AST 18 06/15/2021    ALT 16 06/15/2021        Imaging:  I've personally reviewed the patient's CT chest  Spiculated mass lesions in the right lung, consistent with malignancy.       Multiple smaller areas of nodularity scattered in the right greater than left   lung could be related to metastatic disease.       Several ill-defined ground-glass opacities also more notable on the right may   be infectious or inflammatory but malignancy not excluded.       Bilateral pleural effusions.       Partial visualization of abdominal aortic aneurysm.  Dedicated imaging of the   abdomen recommended to view the entire extent of the aneurysm.       Cardiomegaly. Telemetry:  I've personally reviewed the patient's telemetry:  A-fib HR~110-120    Assessment/Plan:  Principal Problem:    Acute decompensated heart failure (HCC)  Active Problems:    COPD (chronic obstructive pulmonary disease) (HCC)    Essential hypertension    Lung mass    Elevated serum creatinine    Atrial fibrillation (HCC)    Atrial fibrillation with rapid ventricular response (HCC)    Moderate protein-calorie malnutrition (HCC)    Acute kidney injury superimposed on CKD (HCC)    Abdominal aortic aneurysm (AAA) without rupture (HCC)  Resolved Problems:    * No resolved hospital problems. *       Continue metoprolol. HR marginally controlled, defer further dose adjustments to cardiology    Hold Eliquis    Request IR biopsy of the mass. Pulmonary consult    Blood pressure reasonably well controlled    Euvolemic from CHF standpoint, continue oral Lasix    Echocardiogram still pending    Creatinine persistently elevated, he may have CKD.   Continue to hold losartan    Requires continued inpatient level of care   Amadeo Gray MD    1:02 PM  6/17/2021

## 2021-06-18 NOTE — PROGRESS NOTES
(ZOFRAN) injection 4 mg, 4 mg, Intravenous, Q6H PRN, Michael Castillo MD    polyethylene glycol (GLYCOLAX) packet 17 g, 17 g, Oral, Daily PRN, Michael Castillo MD    acetaminophen (TYLENOL) tablet 650 mg, 650 mg, Oral, Q6H PRN **OR** acetaminophen (TYLENOL) suppository 650 mg, 650 mg, Rectal, Q6H PRN, Michael Castillo MD    ipratropium-albuterol (DUONEB) nebulizer solution 1 ampule, 1 ampule, Inhalation, Q6H PRN, Michael Castillo MD, 1 ampule at 06/18/21 0628    [Held by provider] apixaban (ELIQUIS) tablet 2.5 mg, 2.5 mg, Oral, BID, Michael Castillo MD, 2.5 mg at 06/17/21 0758    Physical Exam:  BP (!) 141/82   Pulse 116   Temp 98 °F (36.7 °C) (Temporal)   Resp 16   Ht 5' 9\" (1.753 m)   Wt 147 lb 3.2 oz (66.8 kg)   SpO2 97%   BMI 21.74 kg/m²   Wt Readings from Last 3 Encounters:   06/18/21 147 lb 3.2 oz (66.8 kg)   06/08/21 156 lb (70.8 kg)   03/19/16 171 lb 4.8 oz (77.7 kg)     Appearance: Awake, alert, no acute respiratory distress  Skin: Intact, no rash  Head: Normocephalic, atraumatic  Eyes: EOMI, no conjunctival erythema  ENMT: No pharyngeal erythema, MMM, no rhinorrhea  Neck: Supple, no elevated JVP, no carotid bruits  Lungs: Clear to auscultation bilaterally. No wheezes, rales, or rhonchi. Cardiac: PMI nondisplaced, irregular rhythm with a normal rate, S1 & S2 normal, no murmurs  Abdomen: Soft, nontender, +bowel sounds  Extremities: Moves all extremities x 4, trivial lower extremity edema  Neurologic: No focal motor deficits apparent, normal mood and affect  Peripheral Pulses: Intact posterior tibial pulses bilaterally    Intake/Output:    Intake/Output Summary (Last 24 hours) at 6/18/2021 1020  Last data filed at 6/18/2021 0035  Gross per 24 hour   Intake 120 ml   Output 350 ml   Net -230 ml     No intake/output data recorded.     Laboratory Tests:  Recent Labs     06/16/21  0613 06/17/21  0538 06/18/21  0606    139 140   K 4.4 4.3 4.2   CL 98 98 99   CO2 31* 34* 31*   BUN 24* 28* 34*   CREATININE 1.8* visualization of abdominal aortic aneurysm.  Dedicated imaging of the   abdomen recommended to view the entire extent of the aneurysm.       Cardiomegaly. Echocardiogram: Pending    Stress test:    Pharmacologic stress 6/16/2021      Impression       1. The myocardial perfusion is normal.   2. No evidence of stress-induced ischemia or prior myocardial   infarction. 3. Mild LV systolic dysfunction, EF 40% with above-noted wall motion   abnormalities. 4. There is no transient ischemic dilation. 5. Intermediate risk myocardial perfusion study. Cardiac catheterization:       Ultrasound abdomen  Measurements are as follows:       Proximal: 2.6 x 2.5 cm       Mid: 2.1 x 2.5 cm       Distal: 5.0 x 5.0 cm       Right iliac: 1.8 x 1 point cm       Left iliac: 1.3 x 1.5 cm           Impression       An abdominal aortic aneurysm is noted within the distal abdominal   aorta.         ----------------------------------------------------------------------------------------------------------------------------------------------------------------  IMPRESSION:  1. Acute heart failure with mildly reduced ejection fraction, improved  2. Cardiomyopathy. EF 44% by SPECT  3. Persistent atrial fibrillation, recent onset. Rate controlled, AC with dose adjusted apixaban  4. Mild elevation in hs-cTnT 29-> 30. Likely mild myocardial injury in the setting of the above. Not consistent with ACS  5. 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 Prior history small cell lung cancer  7. ANA/CKD creatinine 1.8-> 2.0  8. COPD  9.  AAA, 5 cm by ultrasound    RECOMMENDATIONS:     Continue with rate control strategy at this time, continue present dose of metoprolol succinate   Add ACE/ARB/ARNI if renal function stabilizes   Discontinue diuretic given bump in creatinine   Would resume bumetanide 1 mg daily upon discharge   Dose adjusted apixaban for stroke risk reduction, currently held for lung biopsy   Echocardiogram pending   Abdominal ultrasound to assess aneurysm, will need outpatient monitoring   Aggressive risk factor modification   Per pulmonary, biopsy may be done as outpatient   From cardiac standpoint he can be discharged outpatient follow-up   Will be available as needed, please call with questions    Renita Mathias MD, [id number] [facility]    NOTE: This report was transcribed using voice recognition software. Every effort was made to ensure accuracy; however, inadvertent computerized transcription errors may be present.

## 2021-06-18 NOTE — PROGRESS NOTES
Pulmonary [facility]                             Pulmonary Consult/Progress Note :            Reason for Consultation:Lung mass   CC : SOB ,A fib   HPI:   Doing better  No SOB  A fib better controlled       PHYSICAL EXAMINATION:     VITAL SIGNS:  /72   Pulse 94   Temp 98 °F (36.7 °C) (Temporal)   Resp 16   Ht 5' 9\" (1.753 m)   Wt 147 lb 3.2 oz (66.8 kg)   SpO2 97%   BMI 21.74 kg/m²   Wt Readings from Last 3 Encounters:   06/18/21 147 lb 3.2 oz (66.8 kg)   06/08/21 156 lb (70.8 kg)   03/19/16 171 lb 4.8 oz (77.7 kg)     Temp Readings from Last 3 Encounters:   06/18/21 98 °F (36.7 °C) (Temporal)   03/19/16 97.4 °F (36.3 °C) (Oral)     TMAX:  BP Readings from Last 3 Encounters:   06/18/21 121/72   06/08/21 (!) 158/102   03/19/16 126/64     Pulse Readings from Last 3 Encounters:   06/18/21 94   06/08/21 112   03/19/16 68           INTAKE/OUTPUTS:  I/O last 3 completed shifts:   In: 120 [P.O.:120]  Out: 350 [Urine:350]    Intake/Output Summary (Last 24 hours) at 6/18/2021 1429  Last data filed at 6/18/2021 1410  Gross per 24 hour   Intake 540 ml   Output 500 ml   Net 40 ml       General Appearance: alert and oriented to person, place and time, well-developed and   well-nourished, in no acute distress   Eyes: pupils equal, round, and reactive to light, extraocular eye movements intact, conjunctivae normal and sclera anicteric   Neck: neck supple and non tender without mass, no thyromegaly, no thyroid nodules and no cervical adenopathy   Pulmonary/Chest:*rhocnhi bilateral   Cardiovascular: normal rate, regular rhythm, normal S1 and S2, no murmurs, rubs, clicks or gallops, distal pulses intact, no carotid bruits, no murmurs, no gallops, no carotid bruits and no JVD   Abdomen: obese, soft, non-tender, non-distended, normal bowel sounds, no masses or organomegaly   Extremities:edema  Musculoskeletal: normal range of motion, no joint swelling, deformity or tenderness Neurologic: reflexes normal and symmetric, no cranial nerve deficit noted    LABS/IMAGING:    CBC:  Lab Results   Component Value Date    WBC 8.6 06/16/2021    HGB 14.7 06/16/2021    HCT 45.5 06/16/2021    MCV 95.6 06/16/2021     06/16/2021    LYMPHOPCT 14.7 (L) 06/16/2021    RBC 4.76 06/16/2021    MCH 30.9 06/16/2021    MCHC 32.3 06/16/2021    RDW 14.8 06/16/2021    NEUTOPHILPCT 70.4 06/16/2021    MONOPCT 12.5 (H) 06/16/2021    BASOPCT 0.8 06/16/2021    NEUTROABS 6.04 06/16/2021    LYMPHSABS 1.26 (L) 06/16/2021    MONOSABS 1.07 (H) 06/16/2021    EOSABS 0.08 06/16/2021    BASOSABS 0.07 06/16/2021       Recent Labs     06/16/21  0613 06/15/21  0956   WBC 8.6 8.6   HGB 14.7 14.8   HCT 45.5 45.4   MCV 95.6 94.6    224       BMP:   Recent Labs     06/16/21  0613 06/17/21  0538 06/18/21  0606    139 140   K 4.4 4.3 4.2   CL 98 98 99   CO2 31* 34* 31*   BUN 24* 28* 34*   CREATININE 1.8* 1.9* 2.0*       MG:   Lab Results   Component Value Date    MG 2.3 03/19/2016     Ca/Phos:   Lab Results   Component Value Date    CALCIUM 9.0 06/18/2021     Amylase:   Lab Results   Component Value Date    AMYLASE 48 03/18/2016     Lipase:   Lab Results   Component Value Date    LIPASE 29 03/18/2016     LIVER PROFILE:   No results for input(s): AST, ALT, LIPASE, BILIDIR, BILITOT, ALKPHOS in the last 72 hours. Invalid input(s): AMYLASE,  ALB    PT/INR: No results for input(s): PROTIME, INR in the last 72 hours. APTT: No results for input(s): APTT in the last 72 hours.     Cardiac Enzymes:  Lab Results   Component Value Date    CKTOTAL 70 03/18/2016    CKMB 2.7 03/18/2016    TROPONINI <0.01 03/18/2016                       PROBLEM LIST:  Patient Active Problem List   Diagnosis    Acute respiratory failure with hypoxia (HCC)    COPD (chronic obstructive pulmonary disease) (HCC)    Hyperlipidemia LDL goal <100    Essential hypertension    Acute decompensated heart failure (HCC)    Lung mass    Elevated serum creatinine    Atrial fibrillation (HCC)    Atrial fibrillation with rapid ventricular response (HCC)    Moderate protein-calorie malnutrition (HCC)    Acute kidney injury superimposed on CKD ([address]    Abdominal aortic aneurysm (AAA) without rupture (HCC)               ASSESSMENT:  1.)S/P  LEFT LUNG, UPPER LOBE, LOBECTOMY . ADENOCARCINOMA, MIXED TYPE 2004  2.)  New to lung nodules suggestive for malignancy in the right lung 3.)   COPD  4.)  A. fib  5.)  Possible obstructive sleep apnea  6) heart failure with ejection fraction 40-45  PLAN:    I have no doubt this is malignancy diagnosed  Scheduled by hospital for CT guided biopsy  If IR has concern to do it ,then I will do Navigation bronch   BD  LAMA/LABAon discharged  Discuss with wife   Discuss with dr Pili Reveles  Pulmonary&Critical Care Medicine   Director of [address] [place] [address] Director of [address] [place] [address]    Ammie Heimlich    NOTE: This report was transcribed using voice recognition software. Every effort was made to ensure accuracy; however, inadvertent computerized transcription errors may be present.

## 2021-06-18 NOTE — TELEPHONE ENCOUNTER
Received referral from Dr. Griselda Moreno for AAA, left message for patient to schedule appointment with Dr. Ivan Blanco.

## 2021-06-18 NOTE — PROGRESS NOTES
CLINICAL PHARMACY NOTE: MEDS TO BEDS    Total # of Prescriptions Filled: 1   The following medications were delivered to the patient:  · Metoprolol succinate 100 mg    Additional Documentation:

## 2021-06-18 NOTE — DISCHARGE SUMMARY
Physician Discharge Summary     Patient ID:  Nino Carrel  [id number]  81 y.o.  1935    Admit date: 6/15/2021    Discharge date and time:  6/18/2021     Admission Diagnoses:   Chief Complaint   Patient presents with    Shortness of Breath     WITH LEG SWELLING      Acute decompensated heart failure [facility])     Discharge Diagnoses:   Principal Problem:    Acute decompensated heart failure ([facility]  Active Problems:    COPD (chronic obstructive pulmonary disease) ([facility]    Essential hypertension    Lung mass    Elevated serum creatinine    Atrial fibrillation (HCC)    Atrial fibrillation with rapid ventricular response (HCC)    Moderate protein-calorie malnutrition (HCC)    Acute kidney injury superimposed on CKD ([facility]    Abdominal aortic aneurysm (AAA) without rupture ([facility]  Resolved Problems:    * No resolved hospital problems. *       Consults: cards, pulm    Procedures:   Nuclear stress test  1. The myocardial perfusion is normal.   2. No evidence of stress-induced ischemia or prior myocardial   infarction. 3. Mild LV systolic dysfunction, EF 12% with above-noted wall motion   abnormalities. 4. There is no transient ischemic dilation. 5. Intermediate risk myocardial perfusion study. TTE:     Atrial fibrillation noted.        Normal left ventricular size.    LV systolic function is mildly reduced.    Ejection fraction is visually estimated at 40-45%.    Abnormal diastolic function.    No regional wall motion abnormalities seen.    Normal left ventricular wall thickness.    The right ventricle is enlarged with normal function.    Markedly enlarged right atrium.    Mild mitral regurgitation.    Mild tricuspid regurgitation.        CT Chest  Spiculated mass lesions in the right lung, consistent with malignancy.       Multiple smaller areas of nodularity scattered in the right greater than left   lung could be related to metastatic disease.       Several ill-defined ground-glass opacities also more Eliquis, he needs to resume it at the 2.5 mg twice daily dosage. I have notified him of this. He also was found to have an incidental AAA measuring up to 5 cm that will need outpatient follow up.   I've referred him to vascular surgery    Discharge Exam:  Vitals:    06/17/21 2044 06/18/21 0035 06/18/21 0612 06/18/21 0755   BP:  130/72 136/86 (!) 141/82   Pulse: 106 99 103 116   Resp:  18 18 16   Temp:  98.4 °F (36.9 °C) 98.4 °F (36.9 °C) 98 °F (36.7 °C)   TempSrc:  Temporal Temporal Temporal   SpO2:  95% 97% 97%   Weight:   147 lb 3.2 oz (66.8 kg)    Height:            General appearance: NAD, conversant  HEENT: AT/NC, MMM  Neck: FROM, supple  Lungs: Clear to auscultation, WOB normal  CV: RRR, no MRGs  Abdomen: Soft, non-tender; no masses or HSM, +BS  Extremities: Trace b/l LE edema  Skin: no rash, lesions or ulcers  Psych: Calm and cooperative  Neuro: Alert and interactive, nonfocal     Condition:  Stable    Disposition: home    Patient Instructions:   Current Discharge Medication List      CONTINUE these medications which have CHANGED    Details   metoprolol succinate (TOPROL XL) 100 MG extended release tablet Take 1 tablet by mouth 2 times daily  Qty: 60 tablet, Refills: 3         CONTINUE these medications which have NOT CHANGED    Details   albuterol-ipratropium (COMBIVENT RESPIMAT)  MCG/ACT AERS inhaler Inhale 2 puffs into the lungs every 6 hours as needed for Wheezing       Cholecalciferol (VITAMIN D3) 2000 UNITS CAPS Take 2,000 Units by mouth daily       atorvastatin (LIPITOR) 10 MG tablet Take 10 mg by mouth every 48 hours In the evening         STOP taking these medications       losartan (COZAAR) 50 MG tablet Comments:   Reason for Stopping:         ELIQUIS 5 MG TABS tablet Comments:   Reason for Stopping:         metoprolol tartrate (LOPRESSOR) 50 MG tablet Comments:   Reason for Stopping:                  Activity: activity as tolerated  Diet: regular diet    Follow-up with PCP in 1 week.    Note that over 30 minutes was spent in preparing discharge papers, discussing discharge with patient, medication review, etc.    Signed:  Kierra Mcclure MD    6/18/2021  12:52 PM

## 2021-06-24 PROBLEM — R09.02 HYPOXIA: Status: ACTIVE | Noted: 2021-01-01

## 2021-06-24 NOTE — H&P (VIEW-ONLY)
accurate to the best of my knowledge at the time of the exam.    Allergies:    Patient has no known allergies. Social History:    reports that he has quit smoking. He has never used smokeless tobacco. He reports previous alcohol use. He reports that he does not use drugs. Family History:   Unable to obtain at this time      PHYSICAL EXAM:    Vitals:  BP (!) 152/88   Pulse 70   Temp 98.1 °F (36.7 °C) (Temporal)   Resp 18   Ht 5' 9\" (1.753 m)   Wt 147 lb (66.7 kg)   SpO2 100%   BMI 21.71 kg/m²       General appearance: NAD, conversant, fatigued   Eyes: Sclerae anicteric, PERRLA  HEENT: AT/NC, MMM  Neck: FROM, supple, no thyromegaly  Lymph: No cervical / supraclavicular lymphadenopathy  Lungs: Inspiratory and expiratory wheezes 3 liters O2  CV: irregular, no MRGs, no lower extremity edema  Abdomen: Soft, non-tender; no masses or HSM, +BS  Extremities: FROM without synovitis. No clubbing or cyanosis of the hands. Skin: no rash, induration, lesions, or ulcers  Psych: Calm and cooperative. Normal judgement and insight. Normal mood and affect. Neuro: Alert and interactive, face symmetric, speech fluent. LABS:  All labs reviewed.   Of note:  CBC with Differential:    Lab Results   Component Value Date    WBC 10.3 06/24/2021    RBC 5.15 06/24/2021    HGB 16.0 06/24/2021    HCT 50.1 06/24/2021     06/24/2021    MCV 97.3 06/24/2021    MCH 31.1 06/24/2021    MCHC 31.9 06/24/2021    RDW 15.3 06/24/2021    LYMPHOPCT 12.8 06/24/2021    MONOPCT 12.6 06/24/2021    BASOPCT 0.8 06/24/2021    MONOSABS 1.30 06/24/2021    LYMPHSABS 1.32 06/24/2021    EOSABS 0.07 06/24/2021    BASOSABS 0.08 06/24/2021     CMP:    Lab Results   Component Value Date     06/24/2021    K 5.2 06/24/2021     06/24/2021    CO2 29 06/24/2021    BUN 35 06/24/2021    CREATININE 2.0 06/24/2021    GFRAA 39 06/24/2021    LABGLOM 32 06/24/2021    GLUCOSE 119 06/24/2021    PROT 6.6 06/24/2021    LABALBU 3.9 06/24/2021    CALCIUM 9.5 06/24/2021    BILITOT 1.3 06/24/2021    ALKPHOS 90 06/24/2021    AST 30 06/24/2021    ALT 19 06/24/2021       Imaging:  CXR: There are no findings of pneumonia. Emphysema changes. The 2 masses seen within the right lung involving the right suprahilar region and superior segment of the right lower lobe are poorly visualized on the chest x-ray. EKG:  A. fib RVR    Telemetry:  I've personally reviewed the patient's telemetry:  A. fib    ASSESSMENT/PLAN:  Principal Problem:    Congestive heart failure (CHF) (HCC)  Active Problems:    COPD (chronic obstructive pulmonary disease) (HCC)    Atrial fibrillation (HCC)    Hypoxia  Resolved Problems:    * No resolved hospital problems. *    81-year-old male with a past medical history of hypertension, A. fib, and left lung CA admitted to med surg with telemetry     CHF  -Diurese cautiously so as to avoid renal's insufficiency  -BMP a.m. monitor renal function  -Daily weights strict I's and O's  -Low-sodium diet  -Supplement O2 to maintain pulse ox duration greater than 93% wean as tolerated    Afib  -Toprol  rate control  -Eliquis on hold due to lung biopsy scheduled for next week    COPD  -Duonebs q 4 hours while awake  -Supplement O2    Medication for other comorbidities continue as appropriate dose adjustment as necessary. DVT prophylaxis  PT OT  Discharge planning  Case discussed with attending and agreed upon plan of care. Code status: Full  Requires inpatient level of care  GRACIELA Menard CNP    3:12 PM  6/24/2021     Patient recently admitted for volume overload/CHF  Most recent echocardiogram with ejection fraction 40 to 45%  Unfortunately patient has not been on a diuretic at home  IV diuresis with quick transition to p.o. BNP elevated on admission  From medical standpoint he will be able to discharge once euvolemic    I  provided care for the patient. Radiographs, labs and medication list were reviewed by me independently.   The case was discussed in detail and plans for care were established. Review of [address] [place] [address], documentation was conducted and revisions were made as appropriate directly by me. I agree with the above documented exam, problem list, and plan of care.      Cristopher Gan MD  9:46 AM  6/24/2021

## 2021-06-24 NOTE — ED PROVIDER NOTES
Patient presents with shortness of breath. He mentions that he was previously discharged approximately 1 week ago for the same complaint. Chart review shows that he had been admitted at that time for congestive heart failure. Patient states that since then he has progressively getting worsening shortness of breath. He has been taking his inhaler twice a day with no improvement. He mentions however that when he was placed on oxygen that he had a great improvement in his breathing. He normally is not on home oxygen and is currently on 4 L. Patient denies any chest pain or lower extremity swelling is accompanying his symptoms at this time. His shortness of breath is worse on exertion and better at rest. He does have a history of COPD and was a former smoker. He did get his Covid vaccination. The history is provided by the patient and the spouse. No  was used. Review of Systems   Constitutional: Negative for chills and fever. HENT: Negative for ear pain, sinus pressure and sore throat. Eyes: Negative for pain, discharge and redness. Respiratory: Positive for shortness of breath. Negative for cough and wheezing. Cardiovascular: Negative for chest pain. Gastrointestinal: Negative for abdominal pain, diarrhea, nausea and vomiting. Genitourinary: Negative for dysuria and frequency. Musculoskeletal: Negative for arthralgias and back pain. Skin: Negative for rash and wound. Neurological: Negative for weakness and headaches. Hematological: Negative for adenopathy. All other systems reviewed and are negative. Physical Exam  Vitals and nursing note reviewed. Constitutional:       General: He is not in acute distress. Appearance: He is well-developed. HENT:      Head: Normocephalic and atraumatic. Eyes:      Conjunctiva/sclera: Conjunctivae normal.   Cardiovascular:      Rate and Rhythm: Normal rate and regular rhythm. Heart sounds: Normal heart sounds. No murmur heard. Pulmonary:      Effort: Pulmonary effort is normal. No accessory muscle usage or respiratory distress. Breath sounds: Wheezing present. No rales. Abdominal:      General: Bowel sounds are normal.      Palpations: Abdomen is soft. Tenderness: There is no abdominal tenderness. There is no guarding or rebound. Musculoskeletal:         General: No tenderness or deformity. Cervical back: Normal range of motion and neck supple. Right lower leg: No edema. Left lower leg: No edema. Skin:     General: Skin is warm and dry. Neurological:      Mental Status: He is alert and oriented to person, place, and time. Cranial Nerves: No cranial nerve deficit. Coordination: Coordination normal.          Procedures     MDM  Number of Diagnoses or Management Options  Diagnosis management comments: Patient presents with shortness of breath. He does have wheezing on exam.  Duo nebs ordered. Patient did have improvement in his breathing and was able to be weaned off oxygen however when he was attempted to be ambulated, he was unable to do so due to shortness of breath. CBC did not show any anemias. CMP was largely unremarkable outside of patient's baseline creatinine of 2.0. Chest x-ray did show emphysematous changes. ABG was largely unremarkable. EKG did not show any concerning changes however did show patient was in A. fib with RVR. He had not however taken his morning dose of metoprolol. Metoprolol given. He statesBNP was elevated however he clinically does not show any signs of CHF. At this point patient is unable to care for himself without becoming shortness of breath. He would need to be admitted for further evaluation and care of his acute respiratory failure with hypoxia. Patient was informed the results and plan and is agreeable. Patient admitted to telemetry.        Amount and/or Complexity of Data Reviewed  Clinical lab tests: reviewed  Tests in the radiology section of CPT®: reviewed  Tests in the medicine section of CPT®: reviewed  Decide to obtain previous medical records or to obtain history from someone other than the patient: yes         ED Course as of Jun 24 1228 Thu Jun 24, 2021   0855 EKG shows A. fib with a ventricular response of 107 bpm.  Normal axis. There is no obvious ST elevations or T wave inversions. Comparable to previous EKG on 6/15/2021. [BB]   0940 Reevaluted patient, he states his breathing is much improved. Oxygen was dropped to room air    [BB]      ED Course User Index  [BB] Valeri Palmer DO        ED Course as of Jun 24 1228 Thu Jun 24, 2021   6392 EKG shows A. fib with a ventricular response of 107 bpm.  Normal axis. There is no obvious ST elevations or T wave inversions. Comparable to previous EKG on 6/15/2021. [BB]   0940 Reevaluted patient, he states his breathing is much improved. Oxygen was dropped to room air    [BB]      ED Course User Index  [BB] Valeri Palmer DO       --------------------------------------------- PAST HISTORY ---------------------------------------------  Past Medical History:  has a past medical history of Cancer ([facility] [address] and Hypertension. Past Surgical History:  has a past surgical history that includes Lung cancer surgery (01/01/2005) and cardiovascular stress test (N/A, 06/16/2021). Social History:  reports that he has quit smoking. He has never used smokeless tobacco. He reports previous alcohol use. He reports that he does not use drugs. Family History: family history is not on file. The patients home medications have been reviewed. Allergies: Patient has no known allergies.     -------------------------------------------------- RESULTS -------------------------------------------------    LABS:  Results for orders placed or performed during the hospital encounter of 06/24/21   CBC Auto Differential   Result Value Ref Range    WBC 10.3 4.5 - 11.5 E9/L    RBC 5.15 3.80 - 5.80 E12/L    Hemoglobin 16.0 12.5 - 16.5 g/dL    Hematocrit 50.1 37.0 - 54.0 %    MCV 97.3 80.0 - 99.9 fL    MCH 31.1 26.0 - 35.0 pg    MCHC 31.9 (L) 32.0 - 34.5 %    RDW 15.3 (H) 11.5 - 15.0 fL    Platelets 958 177 - 102 E9/L    MPV 10.8 7.0 - 12.0 fL    Neutrophils % 72.0 43.0 - 80.0 %    Immature Granulocytes % 1.1 0.0 - 5.0 %    Lymphocytes % 12.8 (L) 20.0 - 42.0 %    Monocytes % 12.6 (H) 2.0 - 12.0 %    Eosinophils % 0.7 0.0 - 6.0 %    Basophils % 0.8 0.0 - 2.0 %    Neutrophils Absolute 7.46 (H) 1.80 - 7.30 E9/L    Immature Granulocytes # 0.11 E9/L    Lymphocytes Absolute 1.32 (L) 1.50 - 4.00 E9/L    Monocytes Absolute 1.30 (H) 0.10 - 0.95 E9/L    Eosinophils Absolute 0.07 0.05 - 0.50 E9/L    Basophils Absolute 0.08 0.00 - 0.20 E9/L   Comprehensive Metabolic Panel w/ Reflex to MG   Result Value Ref Range    Sodium 139 132 - 146 mmol/L    Potassium reflex Magnesium 5.2 (H) 3.5 - 5.0 mmol/L    Chloride 100 98 - 107 mmol/L    CO2 29 22 - 29 mmol/L    Anion Gap 10 7 - 16 mmol/L    Glucose 119 (H) 74 - 99 mg/dL    BUN 35 (H) 6 - 23 mg/dL    CREATININE 2.0 (H) 0.7 - 1.2 mg/dL    GFR Non-African American 32 >=60 mL/min/1.73    GFR African American 39     Calcium 9.5 8.6 - 10.2 mg/dL    Total Protein 6.6 6.4 - 8.3 g/dL    Albumin 3.9 3.5 - 5.2 g/dL    Total Bilirubin 1.3 (H) 0.0 - 1.2 mg/dL    Alkaline Phosphatase 90 40 - 129 U/L    ALT 19 0 - 40 U/L    AST 30 0 - 39 U/L   Troponin   Result Value Ref Range    Troponin, High Sensitivity 37 (H) 0 - 11 ng/L   Brain Natriuretic Peptide   Result Value Ref Range    Pro-BNP 20,710 (H) 0 - 450 pg/mL   Blood Gas, Arterial   Result Value Ref Range    Date Analyzed [id number]     Time Analyzed 0818     Source: Blood Arterial     pH, Blood Gas 7.394 7.350 - 7.450    PCO2 46.1 (H) 35.0 - 45.0 mmHg    PO2 180.4 (H) 75.0 - 100.0 mmHg    HCO3 27.5 (H) 22.0 - 26.0 mmol/L    B.E. 2.0 -3.0 - 3.0 mmol/L    O2 Sat 99.0 (H) 92.0 - 98.5 %    O2Hb 98.0 (H) 94.0 - 97.0 %    COHb 0.5 0.0 - 1.5 % MetHb 0.5 0.0 - 1.5 %    O2 Content 21.8 mL/dL    HHb 1.0 0.0 - 5.0 %    tHb (est) 15.6 11.5 - 16.5 g/dL    Mode NC- 4 L     Date Of Collection      Time Collected      Pt Temp 37.0 C     ID [id number]     Lab [id number]     Critical(s) Notified . No Critical Values    EKG 12 Lead   Result Value Ref Range    Ventricular Rate 107 BPM    Atrial Rate 153 BPM    QRS Duration 104 ms    Q-T Interval 328 ms    QTc Calculation (Bazett) 437 ms    R Axis 23 degrees    T Axis 4 degrees       RADIOLOGY:  XR CHEST PORTABLE   Final Result   1. There are no findings of pneumonia   2. Emphysematous changes. 3. The 2 masses seen within the right lung involving the right suprahilar   region and superior segment of the right lower lobe are poorly visualized on   the chest x-ray.             ------------------------- NURSING NOTES AND VITALS REVIEWED ---------------------------  Date / Time Roomed:  6/24/2021  6:34 AM  ED Bed Assignment:  [id number]    The nursing notes within the ED encounter and vital signs as below have been reviewed.      Patient Vitals for the past 24 hrs:   BP Temp Pulse Resp SpO2 Height Weight   06/24/21 1140 -- -- 121 -- -- -- --   06/24/21 1132 (!) 153/131 -- 118 27 100 % -- --   06/24/21 1024 -- -- -- -- 94 % -- --   06/24/21 1018 -- -- 114 25 (!) 88 % -- --   06/24/21 0952 (!) 153/113 -- 111 26 96 % -- --   06/24/21 0812 (!) 145/94 -- 106 25 99 % -- --   06/24/21 0738 -- -- -- 18 96 % -- --   06/24/21 0736 -- -- -- 27 97 % -- --   06/24/21 0634 (!) 152/117 96.8 °F (36 °C) 70 18 96 % 5' 9\" (1.753 m) 147 lb (66.7 kg)       Oxygen Saturation Interpretation: Normal    ------------------------------------------ PROGRESS NOTES ------------------------------------------  Re-evaluation(s):  Time: 0916  Patients symptoms show no change  Repeat physical examination is not changed    Counseling:  I have spoken with the patient and discussed todays results, in addition to providing specific details for the plan of care and counseling regarding the diagnosis and prognosis. Their questions are answered at this time and they are agreeable with the plan of admission.    --------------------------------- ADDITIONAL PROVIDER NOTES ---------------------------------  Consultations:  Time: 1033. Spoke with Dr. Palmira Prescott. Discussed case. They will admit the patient. This patient's ED course included: a personal history and physicial examination, multiple bedside re-evaluations, IV medications, cardiac monitoring and continuous pulse oximetry    This patient has remained hemodynamically stable during their ED course. Diagnosis:  1. COPD exacerbation ([address]    2. Acute respiratory failure with hypoxia (HCC)        Disposition:  Patient's disposition: Admit to telemetry  Patient's condition is stable.     Patient was seen and evaluated by both myself and Maribell Gleason, [address],   Resident  06/24/21 7555

## 2021-06-24 NOTE — PROGRESS NOTES
Dr. Paola Nobles MD,    Your patient is on a medication that requires a renal dose adjustment. Renal Function Assessment:    Date Body Weight IBW  Adjusted BW SCr  CrCl Dialysis status   6/24/2021 147 lb (66.7 kg)  Ideal body weight: 70.7 kg (155 lb 13.8 oz) Serum creatinine: 2 mg/dL (H) 06/24/21 0701  Estimated creatinine clearance: 26 mL/min (A) N/a       Pharmacy has renally dose-adjusted the following medication(s):    Date Original Order Renally Adjusted Order   6/24/2021 Lovenox 40mg daily Lovenox 30mg daily       These changes were made per protocol according to the [facility] Renal Function-Based Dose Adjustments Policy    *Please note this dose may need readjusted if your patient's renal function significantly improves. Please contact pharmacy with any questions regarding these changes.     Erica Choi, [facility]  6/24/2021  12:31 PM

## 2021-06-24 NOTE — H&P
[address] [facility]  History and Physical      CHIEF COMPLAINT:  Shortness of breath       Patient of Latrice Prajapati MD presents with:  Congestive heart failure (CHF) ([address]    History of Present Illness:   Patient is an 67-year-old male with past medical history of hypertension and left lung CA. Patient presented to the ED with complaints of shortness of breath beginning approximately 1 week ago. Patient was recently discharged from our facility on 6/18/2021 with new onset of A. fib. Patient states he was short of breath after he got home from the hospital and it is just increasingly worsened. Patient admits to using his inhaler without improvement. Patient does not have home O2. Patient is currently on 3 L and in no distress. Patient denies any chest pain, fever, chills, headache, and abdominal pain. Patient was a former smoker and he has been vaccinated for Covid. ER work-up revealed a elevated BNP 20,710, and the patient was in A. fib RVR. Patient was admitted to [place] [address] with telemetry for further testing and treatment. REVIEW OF SYSTEMS:  Pertinent negatives are above in HPI. 10 point ROS otherwise negative.       Past Medical History:   Diagnosis Date    Cancer [facility]) 2005    left lung    Hypertension          Past Surgical History:   Procedure Laterality Date    CARDIOVASCULAR STRESS TEST N/A 06/16/2021    Lexiscan stress test    LUNG CANCER SURGERY  01/01/2005       Medications Prior to Admission:    Medications Prior to Admission: metoprolol succinate (TOPROL XL) 100 MG extended release tablet, Take 1 tablet by mouth 2 times daily  albuterol-ipratropium (COMBIVENT RESPIMAT)  MCG/ACT AERS inhaler, Inhale 2 puffs into the lungs daily   atorvastatin (LIPITOR) 10 MG tablet, Take 10 mg by mouth every other day   Cholecalciferol (VITAMIN D3) 2000 UNITS CAPS, Take 2,000 Units by mouth daily     Note that the patient's home medications were reviewed and the above list is 9.5 06/24/2021    BILITOT 1.3 06/24/2021    ALKPHOS 90 06/24/2021    AST 30 06/24/2021    ALT 19 06/24/2021       Imaging:  CXR: There are no findings of pneumonia. Emphysema changes. The 2 masses seen within the right lung involving the right suprahilar region and superior segment of the right lower lobe are poorly visualized on the chest x-ray. EKG:  A. fib RVR    Telemetry:  I've personally reviewed the patient's telemetry:  A. fib    ASSESSMENT/PLAN:  Principal Problem:    Congestive heart failure (CHF) (HCC)  Active Problems:    COPD (chronic obstructive pulmonary disease) (HCC)    Atrial fibrillation (HCC)    Hypoxia  Resolved Problems:    * No resolved hospital problems. *    56-year-old male with a past medical history of hypertension, A. fib, and left lung CA admitted to med surg with telemetry     CHF  -Diurese cautiously so as to avoid renal's insufficiency  -BMP a.m. monitor renal function  -Daily weights strict I's and O's  -Low-sodium diet  -Supplement O2 to maintain pulse ox duration greater than 93% wean as tolerated    Afib  -Toprol  rate control  -Eliquis on hold due to lung biopsy scheduled for next week    COPD  -Duonebs q 4 hours while awake  -Supplement O2    Medication for other comorbidities continue as appropriate dose adjustment as necessary. DVT prophylaxis  PT OT  Discharge planning  Case discussed with attending and agreed upon plan of care. Code status: Full  Requires inpatient level of care  GRACIELA Velasquez CNP    3:12 PM  6/24/2021     Patient recently admitted for volume overload/CHF  Most recent echocardiogram with ejection fraction 40 to 45%  Unfortunately patient has not been on a diuretic at home  IV diuresis with quick transition to p.o. BNP elevated on admission  From medical standpoint he will be able to discharge once euvolemic    I  provided care for the patient. Radiographs, labs and medication list were reviewed by me independently.   The case was discussed in detail and plans for care were established. Review of [address] [place] [address], documentation was conducted and revisions were made as appropriate directly by me. I agree with the above documented exam, problem list, and plan of care.      Dyan Watt MD  9:46 AM  6/24/2021

## 2021-06-24 NOTE — PROGRESS NOTES
Notified Roc Montero NP patient still in A. Fib RVR after ordered metoprolol was given. She stated call her back if heart rate stays between 120's-130's BPM. Currently patient's heart rate is in the low 100s. Will continue to monitor.

## 2021-06-25 NOTE — CONSULTS
Comprehensive Nutrition Assessment    Type and Reason for Visit:  Initial, Positive Nutrition Screen, Consult, Patient Education    Nutrition Recommendations/Plan: Ensure HP (vanilla - 150g K/135g NA)    Nutrition Assessment:  Pt adm w/ SOB 2/2 COPD exacerbation. Hx CHF, Afib, lung CA. Educated pt on cardiac diet guidelines. Noted mod malnutriton w/ increased needs. Ensure HP (vanilla - 150g K/ 135g NA)    Malnutrition Assessment:  Malnutrition Status: Moderate malnutrition    Context:  Chronic Illness     Findings of the 6 clinical characteristics of malnutrition:  Energy Intake:  Mild decrease in energy intake (Comment)  Weight Loss:  Unable to assess (no measured wt hx on file)     Body Fat Loss:   (moderate) Orbital, Buccal region   Muscle Mass Loss:   (moderate) Temples (temporalis), Clavicles (pectoralis & deltoids), Hand (interosseous)  Fluid Accumulation:  No significant fluid accumulation     Strength:  Not Performed    Estimated Daily Nutrient Needs:  Energy (kcal):  7349-7463; Weight Used for Energy Requirements:  Current     Protein (g):  ; Weight Used for Protein Requirements:  Current (1.3-1.5)        Fluid (ml/day):  1005-2841; Method Used for Fluid Requirements:  1 ml/kcal      Nutrition Related Findings:  pt alert, active BS, round/soft abd +1 pitting edema, -I/Os      Wounds:  None       Current Nutrition Therapies:    ADULT DIET; Regular; Low Sodium (2 gm); Low Potassium (Less than 3000 mg/day)  Adult Oral Nutrition Supplement;  Low Calorie/High Protein Oral Supplement    Anthropometric Measures:  · Height: 5' 9\" (175.3 cm)  · Current Body Weight: 147 lb 2 oz (66.7 kg) (6/25 actual)   · Admission Body Weight: 147 lb 2 oz (66.7 kg) (6/25 actual)    · Usual Body Weight:  (UTO no measured wt on file)     · Ideal Body Weight: 160 lbs; % Ideal Body Weight 92 %   · BMI: 21.7  · BMI Categories: Underweight (BMI less than 22) age over 72       Nutrition Diagnosis:   · Moderate malnutrition, In context of chronic illness related to catabolic illness (COPD) as evidenced by moderate muscle loss, moderate loss of subcutaneous fat, poor intake prior to admission    Nutrition Interventions:   Food and/or Nutrient Delivery:  Continue Current Diet, Start Oral Nutrition Supplement (Ensure HP (vanilla - 150g K/ 135g NA))  Nutrition Education/Counseling:  Education completed, Education initiated (educated pt on cardiac diet/ low na/ low K)   Coordination of Nutrition Care:  Continue to monitor while inpatient    Goals:  pt continues to consume >75% meals w/ ONS       Nutrition Monitoring and Evaluation:   Behavioral-Environmental Outcomes:  None Identified   Food/Nutrient Intake Outcomes:  Food and Nutrient Intake  Physical Signs/Symptoms Outcomes:  Biochemical Data, Nutrition Focused Physical Findings, Skin, Weight, GI Status, Fluid Status or Edema     Discharge Planning:     Too soon to determine     Electronically signed by Luiz Norton on 6/25/21 at 2:59 PM EDT    Contact: [phone]

## 2021-06-25 NOTE — PROGRESS NOTES
CLINICAL PHARMACY NOTE: MEDS TO BEDS    Total # of Prescriptions Filled: 1   The following medications were delivered to the patient:  · bumex 1mg    Additional Documentation:    Delivered to patient 6/25 @1:54pm

## 2021-06-25 NOTE — CARE COORDINATION
Social Work Discharge/Planning:    PULSE OX ON ROOM AIR SITTING____%  PULSE OX ON ROOM AIR AMBULATING ____%  PULSE OX ON ____LITERS SITTING RECOVERY ____%  PULSE OX ON ____LITERS AMBULATING RECOVERY ___%

## 2021-06-25 NOTE — PROGRESS NOTES
Patient SpO2 97% on room air sitting. Patient SpO2 91-94% ambulating on room air. Patient SpO2 94% recovery sitting on room air.

## 2021-06-25 NOTE — PROGRESS NOTES
Subjective: The patient is awake and alert. Walking around room without O2  No acute events overnight. Denies chest pain, angina, SOB   Ambulated in halls w/o issues     Objective:    BP (!) 151/92   Pulse 71   Temp 97.5 °F (36.4 °C) (Oral)   Resp 16   Ht 5' 9\" (1.753 m)   Wt 147 lb 2 oz (66.7 kg)   SpO2 96%   BMI 21.73 kg/m²     In: -   Out: 2000   In: -   Out: 2000 [Urine:2000]    General appearance: NAD, conversant, pleasant  HEENT: AT/NC, MMM  Neck: FROM, supple  Lungs: Diminished throughout   CV: irregular, no MRGs  Vasc: Radial pulses 2+  Abdomen: Soft, non-tender; no masses or HSM  Extremities: No peripheral edema or digital cyanosis  Skin: no rash, lesions or ulcers  Psych: Alert and oriented to person, place and time  Neuro: Alert and interactive     Recent Labs     06/24/21  0701   WBC 10.3   HGB 16.0   HCT 50.1          Recent Labs     06/24/21  0701 06/25/21  0509    137   K 5.2* 4.0    97*   CO2 29 31*   BUN 35* 40*   CREATININE 2.0* 1.9*   CALCIUM 9.5 8.4*       Assessment:    Principal Problem:    Congestive heart failure (CHF) ([facility])  Active Problems:    COPD (chronic obstructive pulmonary disease) ([facility])    Atrial fibrillation ([facility] [address]    Hypoxia  Resolved Problems:    * No resolved hospital problems. *      Plan:    29-year-old male with a past medical history of hypertension, A. fib, and left lung CA admitted to med surg with telemetry      CHF  -Diurese cautiously so as to avoid renal's insufficiency  -BMP a.m. monitor renal function  -Daily weights strict I's and O's  -Low-sodium diet  -Supplement O2 to maintain pulse ox duration greater than 93% wean as tolerated     Afib  -Toprol  rate control  -Eliquis on hold due to lung biopsy scheduled for next week       COPD  -Duonebs q 4 hours while awake  -Supplement O2 currently not using O2 Lung sounds improved.       DVT Prophylaxis   PT/OT  Discharge planning    Mayra Corrigan, GRACIELA - CNP  9:03 AM  6/25/2021 Breath sounds much improved with low-dose IV diuresis  Rate controlled  Administer additional dose of IV Bumex today  Discharge on p.o. Bumex 0.5 p.o. twice daily  Most recent echo reveals ejection fraction 40 to 45%    Echo standpoint he will be okay for discharge today  Follow-up with PCP in 1 week and with cardiology as scheduled    I personally saw, examined and provided care for the patient. Radiographs, labs and medication list were reviewed by me independently. The case was discussed in detail and plans for care were established. Review of [address] [place] [address], documentation was conducted and revisions were made as appropriate directly by me. I agree with the above documented exam, problem list, and plan of care.      Del Joya MD  11:42 AM  6/25/2021

## 2021-06-25 NOTE — PLAN OF CARE
Problem: Falls - Risk of:  Goal: Will remain free from falls  Description: Will remain free from falls  6/25/2021 1004 by Emmanuel Wadsworth RN  Outcome: Met This Shift     Problem: Falls - Risk of:  Goal: Absence of physical injury  Description: Absence of physical injury  6/25/2021 1004 by Emmanuel Wadsworth RN  Outcome: Met This Shift

## 2021-06-28 PROBLEM — I50.22 CHRONIC SYSTOLIC CONGESTIVE HEART FAILURE (HCC): Status: ACTIVE | Noted: 2021-01-01

## 2021-06-28 PROBLEM — R79.89 ELEVATED SERUM CREATININE: Chronic | Status: RESOLVED | Noted: 2021-01-01 | Resolved: 2021-01-01

## 2021-06-28 PROBLEM — I48.91 ATRIAL FIBRILLATION WITH RAPID VENTRICULAR RESPONSE (HCC): Status: RESOLVED | Noted: 2021-01-01 | Resolved: 2021-01-01

## 2021-06-28 PROBLEM — R09.02 HYPOXIA: Status: RESOLVED | Noted: 2021-01-01 | Resolved: 2021-01-01

## 2021-06-28 PROBLEM — E44.0 MODERATE PROTEIN-CALORIE MALNUTRITION (HCC): Chronic | Status: RESOLVED | Noted: 2021-01-01 | Resolved: 2021-01-01

## 2021-06-28 NOTE — TELEPHONE ENCOUNTER
Patients wife called in and stated the surgery is 07/01/21    Per Dr. Noris Dent, do not take the Eliquis now. Check with surgeon on when safe to restart after the surgery due to bleeding risks. Also start on Diltiazem 120 mg qd as heart is beating fast and this will help control rate    Patients wife notified and instructed on medications. She stated understanding.

## 2021-06-28 NOTE — PROGRESS NOTES
Chief Complaint   Patient presents with    Atrial Fibrillation    Congestive Heart Failure       Patient Active Problem List    Diagnosis Date Noted    Chronic systolic congestive heart failure ([facility] 2021    Abdominal aortic aneurysm (AAA) without rupture ([facility]     Lung mass 2021    Atrial fibrillation ([facility] 2021    Congestive heart failure (CHF) ([facility] 06/15/2021    COPD (chronic obstructive pulmonary disease) ([place] [address] 2016    Hyperlipidemia LDL goal <100 2016    Essential hypertension 2016       Current Outpatient Medications   Medication Sig Dispense Refill    apixaban (ELIQUIS) 2.5 MG TABS tablet Take 1 tablet by mouth 2 times daily 60 tablet 5    bumetanide (BUMEX) 1 MG tablet Take 1 tablet by mouth daily 30 tablet 0    metoprolol succinate (TOPROL XL) 100 MG extended release tablet Take 1 tablet by mouth 2 times daily 60 tablet 3    albuterol-ipratropium (COMBIVENT RESPIMAT)  MCG/ACT AERS inhaler Inhale 2 puffs into the lungs daily       atorvastatin (LIPITOR) 10 MG tablet Take 10 mg by mouth every other day       Cholecalciferol (VITAMIN D3) 2000 UNITS CAPS Take 2,000 Units by mouth daily        No current facility-administered medications for this visit.         No Known Allergies    Vitals:    21 1027   BP: (!) 155/83   Pulse: 122   Resp: 16   Weight: 143 lb 14.4 oz (65.3 kg)   Height: 5' 9\" (1.753 m)       Social History     Socioeconomic History    Marital status:      Spouse name: Not on file    Number of children: Not on file    Years of education: Not on file    Highest education level: Not on file   Occupational History    Not on file   Tobacco Use    Smoking status: Former Smoker     Packs/day: 1.00     Years: 10.00     Pack years: 10.00     Quit date: 2001     Years since quittin.0    Smokeless tobacco: Never Used   Vaping Use    Vaping Use: Never used   Substance and Sexual Activity    Alcohol use: Not Currently  Drug use: Never    Sexual activity: Not on file   Other Topics Concern    Not on file   Social History Narrative    Not on file     Social Determinants of Health     Financial Resource Strain:     Difficulty of Paying Living Expenses:    Food Insecurity:     Worried About Running Out of Food in the Last Year:     [address] in the Last Year:    Transportation Needs:     Lack of Transportation (Medical):  Lack of Transportation (Non-Medical):    Physical Activity:     Days of Exercise per Week:     Minutes of Exercise per Session:    Stress:     Feeling of Stress :    Social Connections:     Frequency of Communication with Friends and Family:     Frequency of Social Gatherings with Friends and Family:     Attends Restoration Services:     Active Member of Clubs or Organizations:     Attends Club or Organization Meetings:     Marital Status:    Intimate Partner Violence:     Fear of Current or Ex-Partner:     Emotionally Abused:     Physically Abused:     Sexually Abused:        History reviewed. No pertinent family history. SUBJECTIVE: Delio Tavares presents to the office today for re-evaluation of cardiac diagnoses and hfu.   DR Kirstie Chung 56 . I reviewed OP and hospital records. Recently found as OP to be in AF, and started by PCP on Eliquis. Went to ED sometime after that with SOB, question of at least a component of AF  Had non ischemic stress, echo with EF of 40-45%, no significant valve disease, and severe ZACH, LA not well visualized. Adjusted dose Eliquis initiated by cardiology ( age, CKD), but stopped at discharge by PCP pending his lung bx, which apparently was weeks away. He complains of no new or unstable cardiac or neuro symptoms and denies   chest pain, dyspnea, palpitations and syncope. Review of Systems:   Heart: as above   Lungs: as above   Eyes: denies changes in vision or discharge. Ears: denies changes in hearing or pain.    Nose: denies epistaxis or masses   Throat: denies sore throat or trouble swallowing. Neuro: denies numbness, tingling, tremors. Skin: denies rashes or itching. : denies hematuria, dysuria   GI: denies vomiting, diarrhea   Psych: denies mood changed, anxiety, depression. all others negative. Physical Exam   BP (!) 155/83   Pulse 122   Resp 16   Ht 5' 9\" (1.753 m)   Wt 143 lb 14.4 oz (65.3 kg)   BMI 21.25 kg/m²   Constitutional: Oriented to person, place, and time. Thin  No distress. Head: Normocephalic and atraumatic. Eyes: EOM are normal. Pupils are equal, round, and reactive to light. Neck: Normal range of motion. Neck supple. No hepatojugular reflux and no JVD present. Carotid bruit is not present. No tracheal deviation present. No thyromegaly present. Cardiovascular: rapid rate, irregular rhythm, normal heart sounds and intact distal pulses. Exam reveals no gallop and no friction rub. No murmur heard. Pulmonary/Chest: Effort normal and breath sounds normal. No respiratory distress. No wheezes. No rales. No tenderness. Abdominal: Soft. Bowel sounds are normal. No distension and no mass. No tenderness. No rebound and no guarding. Musculoskeletal: Normal range of motion. No edema and no tenderness. Neurological: Alert and oriented to person, place, and time. Skin: Skin is warm and dry. No rash noted. Not diaphoretic. No erythema. Psychiatric: Normal mood and affect. Behavior is normal.     EKG:  atrial fibrillation, rate 122, axis +40, ST-T changes.     ASSESSMENT AND PLAN:  Patient Active Problem List   Diagnosis    COPD (chronic obstructive pulmonary disease) ([address]    Hyperlipidemia LDL goal <100    Essential hypertension    Congestive heart failure (CHF) (HCC)    Lung mass    Atrial fibrillation ([address]    Abdominal aortic aneurysm (AAA) without rupture ([address]    Chronic systolic congestive heart failure ([address]     Patient with persistent, non valvular AF with RVR today, but no

## 2021-06-28 NOTE — CARE COORDINATION
[facility] Transitions Initial Follow Up Call    Call within 2 business days of discharge: Yes    Patient: Ashutosh Davidson Patient : 1935   MRN: [id number]  Reason for Admission: CHF  Discharge Date: 21 RARS: Readmission Risk Score: 12      Last Discharge [facility]       Complaint Diagnosis Description Type Department Provider    21 Shortness of Breath COPD exacerbation ([facility] . .. ED to Hosp-Admission (Discharged) (ADMITTED) Rich Obregon MD; Traci Beltran MD           Spoke with: Nora Kalia, patient's spouse on HIPAA    Facility: [facility] [place]    Transitions of Care Initial Call      Challenges to be reviewed by the provider   Additional needs identified to be addressed with provider: No  none             Method of communication with provider : none    Was this a readmission? Yes  Patient stated reason for admission: sob  Patients top risk factors for readmission: medical condition-copd, htn, hld, afib, hf, lung mass    Care Transition Nurse (CTN) contacted the family by telephone to perform post hospital discharge assessment. Verified name and  with family as identifiers. Provided introduction to self, and explanation of the CTN role. CTN reviewed discharge instructions, medical action plan and red flags with family who verbalized understanding. Family given an opportunity to ask questions and does not have any further questions or concerns at this time. Were discharge instructions available to patient? Yes. Reviewed appropriate site of care based on symptoms and resources available to patient including: PCP, Specialist and When to call 911. The family agrees to contact the PCP office for questions related to their healthcare. Medication reconciliation was performed with family, who verbalizes understanding of administration of home medications. CTN provided contact information.  Plan for follow-up call in 5-7 days based on severity of symptoms and risk

## 2021-07-01 NOTE — INTERVAL H&P NOTE
H&P Update    Patient's History and Physical  was reviewed. The patient appears likely to able to tolerate the procedure. Risk and benefits discussed including ultimate complications, possibly death and consent obtained.     Vernal Hands, II

## 2021-07-01 NOTE — PRE SEDATION
Gutierrez Banegas II, MD  7/1/2021  9:45 AM        PRE-SEDATION PHYSICIAN ASSESSMENT:      1. HISTORY & PHYSICAL EXAMINATION:  Comments: none    Vitals:    07/01/21 0821   BP: (!) 161/89   Pulse: 74   Resp: 22   Temp: 98.2 °F (36.8 °C)   SpO2: 94%       Allergies: Patient has no known allergies. 2. Heart and Lungs immediately prior to procedure demonstrate no contraindications to proceed      Chief Complaint: <principal problem not specified>    Drug: unknown  Tobacco: unknown    3. PAST ANESTHESIA EXPERIENCE:  unknown. 4. AIRWAY/TEETH/HEAD & NECK(Mallampati Classification):  II (soft palate, uvula, fauces visible)    5: NORMAL RANGE OF MOTION OF NECK: No    6. PATIENT WILL LIKELY TOLERATE PLAN OF MODERATE SEDATION    7. ASA 2.     Everlean Nyhan, MD

## 2021-07-01 NOTE — CARE COORDINATION
[facility] Transitions Follow Up Call    2021    Patient: Shemar Mosley  Patient : 1935   MRN: <[id number]>  Reason for Admission: COPD exacerbation    Discharge Date: 21 RARS: Readmission Risk Score: 12         Spoke with:Wfe    Wife stated pt is resting, had biopsy today for lung mass. Stated she is awaiting call back from  [facility] office regarding resumption of pt's Eliquis which was held for procedure. Stated pt is still fatigues. Writer left message to Dr HILLMAN [facility] office requesting call back. Care Transitions Subsequent and Final Call    Subsequent and Final Calls  Care Transitions Interventions  Other Interventions:            Follow Up  Future Appointments   Date Time Provider Roberto Penn   2021  1:15 PM Leana Delacruz MD [facility]       Deacon Dwyer RN

## 2021-07-01 NOTE — PROGRESS NOTES
1014 - Patient returned from procedure. Dressing checked, clean, dry, and intact. Patient stable. No s/s of complications noted or reported. Vitals will be checked q 15min, see flow sheets. 1029 - Report given to [facility]. Vitals stable.

## 2021-07-01 NOTE — PROGRESS NOTES
0800 Patient arrived via ambulatory with spouse    to Radiology department for Image guided lung biopsy. Allergies, home medications, H&P and fasting instructions reviewed with patient. Vital signs taken. IV placed, blood obtained, IV flushed and prn adapter attached. Blood sample sent to lab for ordered tests. Procedural instructions given, questions answered, understanding expressed and consent signed. Patient given fluoroscopy education, no questions at this time. 0940 To procedure via cart with RN.  1015 Returned via cart. Vss. Alert. Dressing dry and intact to right upper back. 1056 Alert. Vss.resting quietly. 1130 Alert. Vss.dressing dry and intact to right upper back. Respirations nonlabored. 1210 chest xray done. 1245 Given discharge instructions,verbalized understanding of. Discharged via w/c with spouse.

## 2021-07-01 NOTE — BRIEF OP NOTE
Brief Postoperative Note    Petar Given  YOB: 1935  [id number]    Pre-operative Diagnosis and Procedure: right lung mass plan biopsy    Post-operative Diagnosis: Same    Anesthesia: Local    Estimated Blood Loss: < 10 cc    Surgeon: Gretta Dakins D.     Complications: none    Specimen obtained: yes    Findings: none     Jeremy Palma II, MD   7/1/2021 9:45 AM

## 2021-07-06 NOTE — TELEPHONE ENCOUNTER
Left message regarding CT scan at [facility] on 7-15-21 at 10:00 am.  [place] at 9:30 am.  NPO after 6:00 am.

## 2021-07-06 NOTE — PROGRESS NOTES
Vascular Surgery Outpatient Consultation      Chief Complaint   Patient presents with    Surgical Consult     AAA       Reason for Consult: Abdominal aortic aneurysm    Requesting Physician:  Dr. Jody Leon:                The patient is a 81 y.o. male who is referred for evaluation of abdominal aortic aneurysm. The patient is accompanied by his wife. The patient was taken to the emergency department for dyspnea. As part of his work-up, he underwent CAT scans that identified a lung nodule and an abdominal aortic aneurysm. He also was found to have heart failure and irregular rhythm. Past Medical History:        Diagnosis Date    A-fib [facility])     [facility]     Cancer ([facility] 2005    left lung    Heart failure ([facility]     Hypertension      Past Surgical History:        Procedure Laterality Date    CARDIOVASCULAR STRESS TEST N/A 06/16/2021    Lexiscan stress test    CT NEEDLE BIOPSY LUNG PERCUTANEOUS  7/1/2021    CT NEEDLE BIOPSY LUNG PERCUTANEOUS 7/1/2021 MD NATALIA Frausto CT    LUNG CANCER SURGERY  01/01/2005     Current Medications:   Prior to Admission medications    Medication Sig Start Date End Date Taking?  Authorizing Provider   apixaban (ELIQUIS) 2.5 MG TABS tablet Take 1 tablet by mouth 2 times daily 6/28/21  Yes Fred Shaver MD   dilTIAZem (CARDIZEM CD) 120 MG extended release capsule Take 1 capsule by mouth daily 6/28/21  Yes Fred Shaver MD   bumetanide (BUMEX) 1 MG tablet Take 1 tablet by mouth daily 6/25/21  Yes Alena Gitelman, MD   metoprolol succinate (TOPROL XL) 100 MG extended release tablet Take 1 tablet by mouth 2 times daily 6/18/21  Yes Lenora Zaman MD   albuterol-ipratropium (COMBIVENT RESPIMAT)  MCG/ACT AERS inhaler Inhale 2 puffs into the lungs every 6 hours as needed for Wheezing Uses bid, may use up to four times daily for sob   Yes Historical Provider, MD   atorvastatin (LIPITOR) 10 MG tablet Take 10 mg by mouth No [x]/Yes []               Diplopia:   No [x]/Yes []               Vision loss:       No [x]/Yes []   Ears, nose, throat:             Hearing loss:    No [x]/Yes []      Vertigo:   No [x]/Yes []                       Swallowing problem:  No [x]/Yes []               Nose bleeds:   No [x]/Yes []      Voice hoarseness:  No [x]/Yes []  Respiratory:             Cough:   No [x]/Yes []      Pleuritic chest pain:  No [x]/Yes []                        Dyspnea:   No []/Yes [x]      Wheezing:   No [x]/Yes []  Cardiovascular:             Angina:   No [x]/Yes []      Palpitations:   No []/Yes [x]          Claudication:    No [x]/Yes []      Leg swelling:   No [x]/Yes []  Gastrointestinal:             Nausea or vomiting:  No [x]/Yes []               Abdominal pain:  No [x]/Yes []                     Intestinal bleeding: No [x]/Yes []  Musculoskeletal:             Leg pain:   No [x]/Yes []      Back pain:   No [x]/Yes []                    Weakness:   No [x]/Yes []  Neurologic:             Numbness:   No [x]/Yes []      Paralysis:   No [x]/Yes []                       Headaches:   No [x]/Yes []  Hematologic, lymphatic:   Anemia:   No [x]/Yes []              Bleeding or bruising:  No [x]/Yes []              Fevers or chills: No [x]/Yes []  Endocrine:             Temp intolerance:   No [x]/Yes []                       Polydipsia, polyuria:  No [x]/Yes []  Skin:              Rash:    No [x]/Yes []      Ulcers:   No [x]/Yes []              Abnorm pigment: No [x]/Yes []  :              Frequency/urgency:  No [x]/Yes []      Hematuria:    No [x]/Yes []                      Incontinence:    No [x]/Yes []    PHYSICAL EXAM:  Vitals:    07/06/21 1321   BP: 122/72     General Appearance: alert and oriented to person, place and time, well developed and well- nourished, in no acute distress  Skin: warm and dry, no rash or erythema  Head: normocephalic and atraumatic  Eyes: extraocular eye movements intact, conjunctivae normal  ENT: external ear and ear canal normal bilaterally, nose without deformity  Pulmonary/Chest: clear to auscultation bilaterally- no wheezes, rales or rhonchi, normal air movement, no respiratory distress  Cardiovascular: normal rate, regular rhythm, normal S1 and S2, no murmurs, no carotid bruits  Abdomen: soft, non-tender, non-distended, normal bowel sounds, no masses or organomegaly  Musculoskeletal: normal range of motion, no joint swelling, deformity or tenderness  Neurologic: no cranial nerve deficit, gait, coordination and speech normal  Extremities: no leg edema bilaterally    PULSE EXAM      Right      Left   Brachial     Radial     Femoral     Popliteal 2 0   Dorsalis Pedis 0 0   Posterior Tibial 0 0   (3=normal, 2=diminished, 1=barely palpable, 4=widened)      Problem List Items Addressed This Visit     None      Visit Diagnoses     AAA (abdominal aortic aneurysm) without rupture (HCC)    -  Primary    Relevant Orders    CT ABDOMEN PELVIS WO CONTRAST Additional Contrast? None            I reviewed with the patient and his wife that I would like him to have a CAT scan to better evaluate the patient. With his history of renal insufficiency, I will order a noncontrast CAT scan. The patient did have a stress test while admitted in June that showed no evidence of ischemia. He is scheduled for a lung biopsy. I will call after I review the CAT scan. No follow-ups on file.

## 2021-07-07 NOTE — CARE COORDINATION
[facility] Transitions Follow Up Call    2021    Patient: Marbella Henry  Patient : 1935   MRN: [id number]  Reason for Admission:   Discharge Date: 21 RARS: Readmission Risk Score: 15         Spoke with: Patient's spouse, Marielena Logan   Patient is sharing on the call    Care Transitions Subsequent and Final Call    Subsequent and Final Calls  Do you have any ongoing symptoms?: Yes  Patient-reported symptoms: Fatigue, Weakness, Shortness of Breath, decreased swelling in BLE  -Patient reports he feels \"washed out\"   -Patient offers C/O shortness of breath with exertion; reports symptoms are about the same  -patient does not have supplemental oxygen  Do you have any questions related to your medications?: No  Do you currently have any active services?: No  Do you have any needs or concerns that I can assist you with?: No  Identified Barriers: Impairment  Care Transitions Interventions  No Identified Needs    Registered Dietician: Declined    Other Interventions: Marielena Doreen is pleasant in conversation and appreciative of phone call follow up. Marielena Logan provides patient update.  Gunner Mosley assists with medication management; reports patient resumed Eliquis on 7/3/2021.  -denies patient with any C/O chest pain, palpitations, lightheaded, or dizziness   -appetite fluctuates   -normal bladder elimination   -irregular bowel patterns; taking prescribed medication   -Marielena Logan reports she is waiting for results of patient's recent biopsy     Emotional support provided; discussed will continue to follow.        Follow Up  Future Appointments   Date Time Provider Roberto Penn   7/15/2021 10:00 AM [facility] 1  CT/AUS SE Toney Madera RN

## 2021-07-13 NOTE — TELEPHONE ENCOUNTER
Call returned to wife after receiving  requesting results of lung biopsy. Advised wife that RN will check with Dr. Lindsey Thomas and will be calling her back with results when available.

## 2021-07-14 NOTE — TELEPHONE ENCOUNTER
Call to home and spoke to wife re: CT Biopsy results. Dr. Flora Tabares was able to review and cytology/pathology results were inconclusive and Dr. Flora Tabares states that if pt/wife want to know what area in lung is then he could do a Bronch/EBUS biopsy procedure. Explained to wife what this would involve and procedure that would be done. Wife would like to discuss with pt and they will call office and let us know what they decide. Wife states \"it may take us a few days but we will get back to you either way\". Advised pt's wife to take her time and discuss and to call if any additional questions. Wife very appreciative of today's follow up call.

## 2021-07-16 NOTE — CARE COORDINATION
[facility] Transitions Follow Up Call    2021    Patient: Shemar Mosley  Patient : 1935   MRN: <[id number]>  Reason for Admission:   Discharge Date: 21 RARS: Readmission Risk Score: 12    Attempted to contact patient for BPCI-A follow up. Unable to reach patient. Left message with contact information and request for call back. Follow Up  No future appointments.     Marta Hernandes RN

## 2021-07-20 NOTE — TELEPHONE ENCOUNTER
Contacted patient's wife. She indicates that patient has not missed any doses of Eliquis. Cardioversion will be scheduled for wk of 7/26/2021 at [facility] per patient's request.  Patient scheduled for 7/27/2021 at 2 pm, Dr. Anjel Edwards to do. Dr. Frances Estevez office notified.

## 2021-07-21 NOTE — TELEPHONE ENCOUNTER
I spoke with the patient's wife over the telephone regarding the CAT scan results. The aneurysm does measure over 5 cm and I do recommend intervention. He would be a candidate for an endovascular stent graft. He is having cardioversion this coming Tuesday. We will wait for results of this procedure before scheduling further.

## 2021-07-23 NOTE — CARE COORDINATION
[facility] Transitions Follow Up Call    2021    Patient: Sheyla Horton  Patient : 1935   MRN: [id number]  Reason for Admission:   Discharge Date: 21 RARS: Readmission Risk Score: 12      Care Transitions Subsequent and Final Call    Subsequent and Final Calls  Do you have any ongoing symptoms?: No  Have your medications changed?: No  Do you have any questions related to your medications?: No  Do you currently have any active services?: No  Do you have any needs or concerns that I can assist you with?: No  Care Transitions Interventions    Registered Dietician: Declined    Other Interventions:       Spoke to pt's wife. States he's scheduled to have his heart \"put back into rhythm\" next week. States he does become symptomatic if he moves around too much but feels fine at rest. Denies swelling or weight gain. Reports taking medications as prescribed. States they're about to eat supper and asked for call back at another time.      Follow Up  Future Appointments   Date Time Provider Roberto Penn   2021  2:00 PM SCHEDULE, SHANTANU STAGE I [facility] I Debi [place]

## 2021-07-26 NOTE — CARE COORDINATION
[facility] Transitions Follow Up Call    2021    Patient: Jaime Keith  Patient : 1935   MRN: [id number]  Reason for Admission:   Discharge Date: 21 RARS: Readmission Risk Score: 12         Spoke with: Patient's spouse    Contacted patient for BPCI-A follow up. Spoke with patient's spouse. She stated Arlette Springer has not seen much improvement since being discharged. Reports he will be having a cardioversion tomorrow. He will become short of breath with exertion. Legs are weak and get tired quickly. She reports his swelling is \"pretty much\" under control. She stated she will see how he does with the cardioversion. She does not have any questions or concerns for CTN at this time. Will continue to follow. Care Transitions Subsequent and Final Call    Subsequent and Final Calls  Do you have any ongoing symptoms?: Yes  Patient-reported symptoms: Shortness of Breath, Weakness  Do you currently have any active services?: No  Do you have any needs or concerns that I can assist you with?: No  Care Transitions Interventions    Registered Dietician: Declined    Other Interventions:            Follow Up  Future Appointments   Date Time Provider Roberto Penn   2021  2:00 PM SCHEDULE, [facility] I Debi ROSENDA Kebede RN

## 2021-07-27 NOTE — H&P
Patient of Dr Tasneem Nunez here for cardioversion. Office note 6/28/21 reviewed no changes, apixaban restarted at that time. Patient confused about apixaban dosing, thinks he is taking it once a day. Wife is \"almost sure\" he takes it twice a day. On metoprolol/diltiazem  Thus I recommend JAYASHREE to rule out thrombus. Risks/benefits explained including esophageal perforation, CVA, failure to restore NSR, recurrence of atrial arrhythmias. Understands and agrees to proceed.     Imp: Persistent AF  Plan: JAYASHREE/DCC

## 2021-07-27 NOTE — ANESTHESIA PRE PROCEDURE
Department of Anesthesiology  Preprocedure Note       Name:  Donis Hunter   Age:  81 y.o.  :  1935                                          MRN:  [id number]         Date:  2021      Surgeon: * No surgeons listed *    Procedure: * No procedures listed *    Medications prior to admission:   Prior to Admission medications    Medication Sig Start Date End Date Taking?  Authorizing Provider   PROCTO-MED HC 2.5 % CREA rectal cream APPLY TO AFFECTED AREA TWICE A DAY MAXIMUM OF 2 WEEKS 21  Yes Historical Provider, MD   apixaban (ELIQUIS) 2.5 MG TABS tablet Take 1 tablet by mouth 2 times daily 21  Yes Tr Wade MD   dilTIAZem (CARDIZEM CD) 120 MG extended release capsule Take 1 capsule by mouth daily 21  Yes Tr Wade MD   bumetanide (BUMEX) 1 MG tablet Take 1 tablet by mouth daily 21  Yes Tyson Murphy MD   metoprolol succinate (TOPROL XL) 100 MG extended release tablet Take 1 tablet by mouth 2 times daily 21  Yes Shai Royal MD   albuterol-ipratropium (COMBIVENT RESPIMAT)  MCG/ACT AERS inhaler Inhale 2 puffs into the lungs every 6 hours as needed for Wheezing Uses bid, may use up to four times daily for sob   Yes Historical Provider, MD   atorvastatin (LIPITOR) 10 MG tablet Take 10 mg by mouth every other day    Yes Historical Provider, MD   Cholecalciferol (VITAMIN D3) 2000 UNITS CAPS Take 2,000 Units by mouth daily    Yes Historical Provider, MD       Current medications:    Current Outpatient Medications   Medication Sig Dispense Refill    PROCTO-MED HC 2.5 % CREA rectal cream APPLY TO AFFECTED AREA TWICE A DAY MAXIMUM OF 2 WEEKS      apixaban (ELIQUIS) 2.5 MG TABS tablet Take 1 tablet by mouth 2 times daily 60 tablet 5    dilTIAZem (CARDIZEM CD) 120 MG extended release capsule Take 1 capsule by mouth daily 30 capsule 3    bumetanide (BUMEX) 1 MG tablet Take 1 tablet by mouth daily 30 tablet 0    metoprolol succinate (TOPROL XL) 100 MG extended release tablet Take 1 tablet by mouth 2 times daily 60 tablet 3    albuterol-ipratropium (COMBIVENT RESPIMAT)  MCG/ACT AERS inhaler Inhale 2 puffs into the lungs every 6 hours as needed for Wheezing Uses bid, may use up to four times daily for sob      atorvastatin (LIPITOR) 10 MG tablet Take 10 mg by mouth every other day       Cholecalciferol (VITAMIN D3) 2000 UNITS CAPS Take 2,000 Units by mouth daily        No current facility-administered medications for this encounter.        Allergies:  No Known Allergies    Problem List:    Patient Active Problem List   Diagnosis Code    [facility] J44.9    Hyperlipidemia LDL goal <100 E78.5    Essential hypertension I10    Congestive heart failure (CHF) ([facility]) I50.9    Lung mass R91.8    Atrial fibrillation ([facility]) I48.91    Abdominal aortic aneurysm (AAA) without rupture ([facility]) I71.4    Chronic systolic congestive heart failure ([facility] I50.22       Past Medical History:        Diagnosis Date    A[facility]fib ([facility]     AAA (abdominal aortic aneurysm) ([facility]     measures 5.0 cm    Cancer ([facility]     left lung    CHF (congestive heart failure) ([facility])     [facility]) [facility]     Heart failure ([facility]     Hyperlipidemia     Hypertension        Past Surgical History:        Procedure Laterality Date    CARDIOVASCULAR STRESS TEST N/A 2021    Lexiscan stress test    CT NEEDLE BIOPSY LUNG PERCUTANEOUS  2021    CT NEEDLE BIOPSY LUNG PERCUTANEOUS 2021 [employer] MD [facility] CT    LUNG CANCER SURGERY  2005       Social History:    Social History     Tobacco Use    Smoking status: Former Smoker     Packs/day: 1.00     Years: 10.00     Pack years: 10.00     Types: Cigarettes     Quit date: 2001     Years since quittin.0    Smokeless tobacco: Never Used   Substance Use Topics    Alcohol use: Not Currently     Comment: no ETOH since                                 Counseling given: Not Answered      Vital Signs (Current):   Vitals:    07/27/21 1219 07/27/21 1220   BP:  (!) 167/81   Pulse: 70    Resp: 20    Temp: 97.4 °F (36.3 °C)    TempSrc: Temporal    SpO2: 100%    Weight: 142 lb (64.4 kg)    Height: 5' 9\" (1.753 m)                                               BP Readings from Last 3 Encounters:   07/27/21 (!) 167/81   07/06/21 122/72   07/01/21 136/85       NPO Status: Time of last liquid consumption: 2100                        Time of last solid consumption: 2100                        Date of last liquid consumption: 07/26/21                        Date of last solid food consumption: 07/26/21    BMI:   Wt Readings from Last 3 Encounters:   07/27/21 142 lb (64.4 kg)   07/23/21 142 lb (64.4 kg)   07/01/21 143 lb (64.9 kg)     Body mass index is 20.97 kg/m². CBC:   Lab Results   Component Value Date    WBC 10.3 06/24/2021    RBC 5.15 06/24/2021    HGB 16.0 06/24/2021    HCT 50.1 06/24/2021    MCV 97.3 06/24/2021    RDW 15.3 06/24/2021     06/24/2021       CMP:   Lab Results   Component Value Date     06/25/2021    K 4.0 06/25/2021    K 5.2 06/24/2021    CL 97 06/25/2021    CO2 31 06/25/2021    BUN 40 06/25/2021    CREATININE 1.9 06/25/2021    GFRAA 41 06/25/2021    LABGLOM 34 06/25/2021    GLUCOSE 129 06/25/2021    PROT 6.6 06/24/2021    CALCIUM 8.4 06/25/2021    BILITOT 1.3 06/24/2021    ALKPHOS 90 06/24/2021    AST 30 06/24/2021    ALT 19 06/24/2021       POC Tests: No results for input(s): POCGLU, POCNA, POCK, POCCL, POCBUN, POCHEMO, POCHCT in the last 72 hours.     Coags:   Lab Results   Component Value Date    PROTIME 12.3 07/01/2021    INR 1.1 07/01/2021       HCG (If Applicable): No results found for: PREGTESTUR, PREGSERUM, HCG, HCGQUANT     ABGs: No results found for: PHART, PO2ART, JUO2QJM, TGW0KOD, BEART, Q7OEDXRE     Type & Screen (If Applicable):  No results found for: LABABO, LABRH    Drug/Infectious Status (If Applicable):  No results found for: HIV, HEPCAB    COVID-19 Screening (If Applicable): No results found for: COVID19        Anesthesia Evaluation  Patient summary reviewed no history of anesthetic complications:   Airway: Mallampati: I  TM distance: >3 FB   Neck ROM: full   Dental:    (+) edentulous      Pulmonary: breath sounds clear to auscultation  (+) COPD:                            ROS comment: Former Smoker   Cardiovascular:    (+) hypertension:, valvular problems/murmurs: MR, dysrhythmias: atrial fibrillation, CHF (EF 45%): systolic, hyperlipidemia        Rhythm: irregular  Rate: normal                    Neuro/Psych:   Negative Neuro/Psych ROS              GI/Hepatic/Renal: Neg GI/Hepatic/Renal ROS            Endo/Other:    (+) blood dyscrasia: anticoagulation therapy:., malignancy/cancer (left lung). Abdominal:             Vascular:   + PVD, aortic or cerebral (AAA (abdominal aortic aneurysm) (HCC) measures 5.0 ), .       Other Findings:           Anesthesia Plan      MAC     ASA 4       Induction: intravenous. Anesthetic plan and risks discussed with patient. Plan discussed with CRNA.                   Emerson Weeks MD   7/27/2021

## 2021-07-27 NOTE — PROCEDURES
PROCEDURE NOTE    Attending Cardiologist: Sarah Ceballos MD  Primary Cardiologist: Lucita Hinds MD    Date of Service: 7/27/2021    Procedure: Transesophageal Echocardiogram and Direct Current Cardioversion    Indication: Persistent atrial fibrillation    Anticoagulant: Apixaban    JAYASHREE: No left atrial appendage thrombus    Antiarrhythmic drug(s): Metoprolol and Diltiazem    Description of procedure:  Patient presented in a fasting and well hydrated state. Informed consent obtained from patient. Risks, benefits and alternatives to JAYASHREE and DC cardioversion were explained to the patient in detail, and the patient acknowledged understanding. Cardiac rhythm, arterial oxygen saturation, and blood pressure were continuously monitored. Deep sedation with propofol administered by the Department of Anesthesiology. JAYASHREE was performed and there was no evidence of LA or ABAD thrombus. See full JAYASHREE report in Epic. After removal of the probe and verification of adequate sedation, direct current cardioversion was performed as described:    Patch placement: Anterior/posterior  Energy: 100, 200, 275 Joules, synchronized  Number of shocks: 3  Outcome: AF --#1--> AF --#2--> AF --#3--> Sinus/ectopic atrial/freq PACs  Complications: None    Impression:  Successful JAYASHREE-guided DC cardioversion of atrial fibrillation to normal sinus rhythm.     Sarah Ceballos MD, [facility]

## 2021-07-28 NOTE — TELEPHONE ENCOUNTER
Attempted to reach pt/wife to discuss scheduling of the Biopsy procedure Dr. Gill Hillman planned to do that pt has agreed to proceed with; line busy after multiple attempts. RN had VM to report pt and wife have decided they want diagnostic procedure/Bronch EBUS. Office to schedule and will attempt to follow up tomorrow with pt.

## 2021-07-28 NOTE — ANESTHESIA POSTPROCEDURE EVALUATION
Department of Anesthesiology  Postprocedure Note    Patient: Lucia Keenan  MRN: [id number]  YOB: 1935  Date of evaluation: 7/28/2021  Time:  6:47 AM     Procedure Summary     Date: 07/27/21 Room / Location: [facility] Stage I    Anesthesia Start: 1064 Anesthesia Stop: 1501    Procedure: CARDIOVERSION Diagnosis: Paroxysmal atrial fibrillation    Scheduled Providers:  Responsible Provider: Aman Geronimo MD    Anesthesia Type: MAC ASA Status: 4          Anesthesia Type: MAC    Bang Phase I: Bang Score: 10    Bang Phase II: Bang Score: 10    Last vitals: Reviewed and per EMR flowsheets. Anesthesia Post Evaluation    Patient location during evaluation: PACU  Patient participation: complete - patient participated  Level of consciousness: awake and alert  Airway patency: patent  Nausea & Vomiting: no nausea and no vomiting  Complications: no  Cardiovascular status: hemodynamically stable  Respiratory status: acceptable  Hydration status: euvolemic  Comments: Department of Anesthesiology  Post-Anesthesia Note    Name:  Lucia Keenan                                         Age:  81 y.o.   MRN:  [id number]     Last Vitals:  /64   Pulse 69   Temp 97.4 °F (36.3 °C) (Temporal)   Resp 20   Ht 5' 9\" (1.753 m)   Wt 142 lb (64.4 kg)   SpO2 96%   BMI 20.97 kg/m²   Patient Vitals in the past 4 hrs:    Level of Consciousness:  Awake    Respiratory:  Stable    Oxygen Saturation:  Stable    Cardiovascular:  Stable    Hydration:  Adequate    PONV:  Stable    Post-op Pain:  Adequate analgesia    Post-op Assessment:  No apparent anesthetic complications    Additional Follow-Up / Treatment / Comment:  None    Aman Geronimo MD  July 28, 2021   6:47 AM

## 2021-07-29 NOTE — CARE COORDINATION
[facility] Transitions Follow Up Call    2021    Patient: Petar Wade  Patient : 1935   MRN: [id number]  Reason for Admission:   Discharge Date: 21 RARS: Readmission Risk Score: 12         Spoke with: Patient's spouse    Contacted patient for BPCI-A follow up. Spoke with patient's spouse. She stated Tawana Olivo felt better than he has ever felt yesterday but today he feels tired and becomes short of breath with exertion. Patient can be heard in the background \"my breathing is half and half\". No c/o chest pain/discomfort, pressure, tightness, noticeable palpitations. Discussed when to contact provider with any new or worsening symptoms. She verbalized understanding. No needs or concerns at this time. Care Transitions Subsequent and Final Call    Subsequent and Final Calls  Do you have any ongoing symptoms?: Yes  Patient-reported symptoms: Shortness of Breath  Have your medications changed?: No  Do you have any questions related to your medications?: No  Do you currently have any active services?: No  Do you have any needs or concerns that I can assist you with?: No  Care Transitions Interventions    Registered Dietician: Declined    Other Interventions:            Follow Up  Future Appointments   Date Time Provider Roberto Penn   2021  1:00 PM Christine Coles  Frank Lawrence RN

## 2021-07-30 NOTE — TELEPHONE ENCOUNTER
Call to home and spoke with pt and wife. Advised on procedure appt date of August 12, 2021 @ 1pm. Advised on arrival time of 11am and need for pt to be driven to appt; due to anesthesia he will have he will no be able to drive himself. Wife verbalized understanding. Pt to hold Eliquis after evening dose on 8/9/21. Advised PAT staff will be calling with further instructions and written instructions for arrival time, parking and appt date to be mailed from RN today to pt home. Wife verbalized understanding and will call office if any questions.

## 2021-08-05 NOTE — CARE COORDINATION
[address] [facility] for Care Improvement ([facility]) Follow Up Call  Qualifying Diagnosis related to Cardiovascular Disease. 8/5/2021  Patient Name:  Claudia Mendieta   YOB: 1935  Discharge Date:  6/25/21  RARS:  Readmission Risk Score: 12    PCP:  Yasmin Bentley MD     Message left in compliance with HIPPA. Stated who I was, representing the [facility], Care Transitions Team. Requested to place a call to their Physician with any immediate health needs/questions/concerns.        Future Appointments   Date Time Provider Roberto Penn   8/23/2021  1:00 PM Jaelyn Bell MD [address] [place] [address] Transitions will continue to follow per [facility] , RN, CTN

## 2021-08-06 NOTE — TELEPHONE ENCOUNTER
Call to pt to advised that Dr. Bell Hensley has ordered CT scan and office has gottne test scheduled for tomorrow am. Wife states pt refuses to go for \"any more testing\". Advised wife that RN will cancel testing for tomorrow and will review need with Dr. Andrzej Avila and will follow up with her by phone on Monday.

## 2021-08-10 NOTE — TELEPHONE ENCOUNTER
Call to pt and wife to confirmed that pt wants to cancel Bronch /EBUS for this week. Pt not feeling well and decided he no longer wants to proceed with the biopsy. Advised wife that if there is any needs to please contact office in the future. RN called and confirmed cancellation of procedure with surgery scheduling.

## 2021-08-13 NOTE — CARE COORDINATION
[facility] Transitions Follow Up Call    2021    Patient: Cam Schneider  Patient : 1935   MRN: [id number]  Reason for Admission:   Discharge Date: 21 RARS: Readmission Risk Score: 12       Spoke with: spouse  Patients wife reports he had a good day yesterday and today. His appetite and fluid intake has improved. she denies swelling, cp, chills, fever,weight gain or cough. He is sob with exertion. He is tired most of the time. No issues with his bladder or bowel. He is due to have a biopsy of his lung. She isn't sure when this is going to happen. No questions or concerns at this time. Will continue to follow. Care Transitions Subsequent and Final Call    Subsequent and Final Calls  Care Transitions Interventions    Registered Dietician: Declined    Other Interventions:            Follow Up  Future Appointments   Date Time Provider Roberto Penn   2021  1:00 PM Alfa Bell MD [facility]       Harjit Jimenez LPN

## 2021-08-19 NOTE — CARE COORDINATION
[facility] Transitions Follow Up Call    2021    Patient: Shanda Barton  Patient : 1935   MRN: [id number]  Reason for Admission:   Discharge Date: 21 RARS: Readmission Risk Score: 12    Attempted to contact patient for BPCI-A follow up. Unable to reach patient. Left message with contact information and request for call back.         Follow Up  Future Appointments   Date Time Provider Roberto Penn   2021  1:00 PM Stalin Hopson  Frank Lawrence RN

## 2021-08-23 NOTE — PROGRESS NOTES
Out Patient CARDIOLOGY FOLLOW UP    Name: Maryam Tomas    Age: 81 y.o. Date of Service: 8/23/2021      Referring Physician: No admitting provider for patient encounter. Chief Complaint   Patient presents with    1 Month Follow-Up    Follow-Up from [address] [place] [address] of [facility]    Swelling        History of Present Illness: With history of COPD, hypertension, hyperlipidemia, heart failure with depressed systolic function with last echocardiogram showing EF of 40 to 45%. Patient also has history of lung mass status post surgical intervention for which he report the biopsy was inconclusive and subsequently undergoing further evaluation and state he has upcoming CT chest and bronchoscopy for further evaluation with his lung doctor. Patient also has atrial fibrillation for which he underwent JAYASHREE guided cardioversion recently and now still remained in sinus rhythm. He is on Eliquis for atrial fibrillation as well as on both beta-blocker and calcium channel blocker for rate control. Patient presents for follow-up visit today and reports he has been having worsening dyspnea on exertion and fatigue with activities but denies chest pain. Also denies orthopnea or paroxysmal nocturnal dyspnea. He did have a pharmacologic myocardial perfusion stress test in June 2021 but despite normal stress test he still complained of fatigue and dyspnea on exertion and while his symptoms may be pulmonary in etiology,  I am going to obtain coronary CT angiogram for further evaluation to rule out potential underlying obstructive coronary artery disease given low systolic function. Patient still has trouble remembering if he is taking Eliquis and he is advised that he is on Eliquis to prevent stroke and if he were to missed his medication he may develop stroke and he verbalized understanding.   I also offered the patient if he is not able to keep up with taking Eliquis we can discuss watchman device during this visit and he verbalized understanding. Review of Systems:   Cardiac: As per HPI  General: Denies fever or chills  Pulmonary: As per HPI  HEENT: Denies runny nose  GI: No complaints  : No complaints  Endocrine: Denies night sweats  Musculoskeletal: No complaints  Skin: Dry skin  Neuro: No complaints  Psych: Denies depression    Past Medical History:  Past Medical History:   Diagnosis Date    [facility]fib ([facility]     AAA (abdominal aortic aneurysm) ([facility]     measures 5.0 cm    Cancer ([facility]     left lung    CHF (congestive heart failure) (HCC)     COPD (chronic obstructive pulmonary disease) (HCC)     Heart failure (HCC)     Hyperlipidemia     Hypertension       Chronic systolic congestive heart failure ([facility] 2021    Abdominal aortic aneurysm (AAA) without rupture (HCC)      Lung mass 2021    Atrial fibrillation (HCC) 2021    Congestive heart failure (CHF) ([facility] 06/15/2021    COPD (chronic obstructive pulmonary disease) ([facility] 2016    Hyperlipidemia LDL goal <100 2016    Essential hypertension        Past Surgical History:  Past Surgical History:   Procedure Laterality Date    CARDIOVASCULAR STRESS TEST N/A 2021    Lexiscan stress test    CT NEEDLE BIOPSY LUNG PERCUTANEOUS  2021    CT NEEDLE BIOPSY LUNG PERCUTANEOUS 2021 Nino Perdue MD [facility] CT    LUNG CANCER SURGERY  2005       Family History:  History reviewed. No pertinent family history.     Social History:  Social History     Socioeconomic History    Marital status:      Spouse name: Not on file    Number of children: Not on file    Years of education: Not on file    Highest education level: Not on file   Occupational History    Not on file   Tobacco Use    Smoking status: Former Smoker     Packs/day: 1.00     Years: 10.00     Pack years: 10.00     Types: Cigarettes     Quit date: 2001     Years since quittin.1    Smokeless tobacco: Never Used   Vaping Use    Vaping Use: Never used   Substance and Sexual Activity    Alcohol use: Not Currently     Comment: no ETOH since 2001    Drug use: Never    Sexual activity: Not on file   Other Topics Concern    Not on file   Social History Narrative    Not on file     Social Determinants of Health     Financial Resource Strain:     Difficulty of Paying Living Expenses:    Food Insecurity:     Worried About Running Out of Food in the Last Year:     [address] in the Last Year:    Transportation Needs:     Lack of Transportation (Medical):  Lack of Transportation (Non-Medical):    Physical Activity:     Days of Exercise per Week:     Minutes of Exercise per Session:    Stress:     Feeling of Stress :    Social Connections:     Frequency of Communication with Friends and Family:     Frequency of Social Gatherings with Friends and Family:     Attends Yarsanism Services:     Active Member of Clubs or Organizations:     Attends Club or Organization Meetings:     Marital Status:    Intimate Partner Violence:     Fear of Current or Ex-Partner:     Emotionally Abused:     Physically Abused:     Sexually Abused: Allergies:  No Known Allergies    Home Medications:  Prior to Admission medications    Medication Sig Start Date End Date Taking?  Authorizing Provider   isosorbide mononitrate (IMDUR) 30 MG extended release tablet Take 2 tablets by mouth daily 8/23/21  Yes Kait Bell MD   PROCTO-MED HC 2.5 % CREA rectal cream APPLY TO AFFECTED AREA TWICE A DAY MAXIMUM OF 2 WEEKS 7/6/21  Yes Historical Provider, MD   apixaban (ELIQUIS) 2.5 MG TABS tablet Take 1 tablet by mouth 2 times daily 6/28/21  Yes Sherry Rico MD   dilTIAZem (CARDIZEM CD) 120 MG extended release capsule Take 1 capsule by mouth daily 6/28/21  Yes Sherry Rico MD   bumetanide (BUMEX) 1 MG tablet Take 1 tablet by mouth daily 6/25/21  Yes Anju Bell MD   metoprolol succinate (TOPROL XL) 100 MG extended release tablet Take 1 tablet by mouth 2 times daily 6/18/21  Yes Jonathan Mcelroy MD   albuterol-ipratropium (COMBIVENT RESPIMAT)  MCG/ACT AERS inhaler Inhale 2 puffs into the lungs every 6 hours as needed for Wheezing Uses bid, may use up to four times daily for sob   Yes Historical Provider, MD   atorvastatin (LIPITOR) 10 MG tablet Take 10 mg by mouth every other day    Yes Historical Provider, MD   Cholecalciferol (VITAMIN D3) 2000 UNITS CAPS Take 2,000 Units by mouth daily    Yes Historical Provider, MD       Current Medications:  Current Outpatient Medications   Medication Sig Dispense Refill    isosorbide mononitrate (IMDUR) 30 MG extended release tablet Take 2 tablets by mouth daily 30 tablet 3    PROCTO-MED HC 2.5 % CREA rectal cream APPLY TO AFFECTED AREA TWICE A DAY MAXIMUM OF 2 WEEKS      apixaban (ELIQUIS) 2.5 MG TABS tablet Take 1 tablet by mouth 2 times daily 60 tablet 5    dilTIAZem (CARDIZEM CD) 120 MG extended release capsule Take 1 capsule by mouth daily 30 capsule 3    bumetanide (BUMEX) 1 MG tablet Take 1 tablet by mouth daily 30 tablet 0    metoprolol succinate (TOPROL XL) 100 MG extended release tablet Take 1 tablet by mouth 2 times daily 60 tablet 3    albuterol-ipratropium (COMBIVENT RESPIMAT)  MCG/ACT AERS inhaler Inhale 2 puffs into the lungs every 6 hours as needed for Wheezing Uses bid, may use up to four times daily for sob      atorvastatin (LIPITOR) 10 MG tablet Take 10 mg by mouth every other day       Cholecalciferol (VITAMIN D3) 2000 UNITS CAPS Take 2,000 Units by mouth daily        No current facility-administered medications for this visit. Physical Exam:  BP (!) 142/78   Pulse 59   Resp 18   Ht 5' 9\" (1.753 m)   Wt 142 lb (64.4 kg)   BMI 20.97 kg/m²   Wt Readings from Last 3 Encounters:   08/23/21 142 lb (64.4 kg)   07/27/21 142 lb (64.4 kg)   07/23/21 142 lb (64.4 kg)       Appearance: Alert and oriented x3 not in acute distress.   Skin: Dry skin  Head: Atraumatic  Eyes: Intact abnormalities seen. Normal left ventricular wall thickness. The right ventricle is enlarged with normal function. Markedly enlarged right atrium. Mild mitral regurgitation. Mild tricuspid regurgitation. Transesophageal echocardiogram July 27, 2021:   Conclusions      Summary   Ejection fraction is visually estimated at 50%. No evidence of thrombus within left atrium or appendage. Mild-moderate mitral regurgitation. Mild aortic valve regurgitation. Moderate tricuspid regurgitation. Stress test reviewed: June 16, 2021:    Impression:   1. No evidence of regadenoson induced ischemia on stress EKG. 2. Nuclear images to be reported separately. Cardiac catheterization reviewed: None on file    CXR reviewed: The ASCVD Risk score (Nadeem Rosales, et al., 2013) failed to calculate for the following reasons: The 2013 ASCVD risk score is only valid for ages 36 to 78    ASSESSMENT / PLAN:    1. Essential hypertension  - EKG 12 lead today shows sinus rhythm 60 bpm nonspecific ST-T wave changes  His blood pressure is not adequately controlled and I have added Imdur 30 mg to optimize both blood pressure and guideline directed medical therapy for his heart failure with depressed systolic function. Check blood pressure and heart rate at home recording medical available during next visit. 2. RICH (dyspnea on exertion)  Coronary CT angiogram for further evaluation to rule out obstructive coronary artery disease given his ongoing symptoms despite recent normal pharmacologic myocardial perfusion stress test.  Last echocardiogram in June 2021 revealed EF of 40 to 45%.     3. Atrial fibrillation, unspecified type ([address]  Status post recent JAYASHREE guided cardioversion now on Eliquis 2.5 mg twice a day based on age and kidney dysfunction  Continue beta-blocker and calcium channel blocker for rate control  We will monitor closely  He was advised to avoid falling activities that may lead to bleeding while on Eliquis and he verbalized understanding. If patient is not able to tolerate Eliquis or not able to take Eliquis we will have to discuss watchman device with him during next visit. 4. Hyperlipidemia LDL goal <100  Continue statin therapy  5. Chronic combined systolic and diastolic congestive heart failure (HCC)  Heart failure with depressed systolic function with EF on transthoracic echocardiogram in June 2021 showing EF of 40 to 45%  Continue beta-blocker and Imdur  He is not on ACE, ARB, antialdosterone antagonist due to kidney dysfunction  If he is able to tolerate Imdur we will add hydralazine during next visit to serve as a substitute for afterload reduction agents  Continue current dose of Bumex and low-salt diet    6. Abdominal aortic aneurysm (AAA) without rupture [facility])  Follow-up with your vascular surgeon  7. Chronic systolic congestive heart failure ([facility]  As mentioned above  8. Chronic obstructive pulmonary disease, unspecified COPD type ([facility]  Follow-up with your pulmonologist    9. Lung mass  Follow-up with your pulmonologist      10. Mild-moderate mitral regurgitation. We will monitor closely for now    11. Mild aortic valve regurgitation. We will monitor closely     12. Moderate tricuspid regurgitation. We will monitor closely        FOLLOW-UP low up in 3 months after coronary CT angiogram for further evaluation. Thank you for allowing me to participate in your patient's care. Please feel free to contact me if you have any questions or concerns.     Carlos Ortiz MD  [facility]) Cardiology

## 2021-08-23 NOTE — TELEPHONE ENCOUNTER
Call to pt and informed CT is being done tomorrow at 3pm here at [facility]. Wife instructed to go to main entrance on [address] and enter thru registration. Verbalized understanding.

## 2021-08-23 NOTE — TELEPHONE ENCOUNTER
Call to pt and spoke to wife [facility]; advised I have had a call from Dr. Kaci Meyer end of last week advising that pt had changed his mind about having the biopsy. Pt agreeable to have Chest CT completed and office to work on getting scheduled in the late morning early afternoon and then once the CT is scheduled RN will also scheduled procedure. Advised wife that RN will work on getting scheduled test and procedure and will call back to further advise.

## 2021-08-27 NOTE — TELEPHONE ENCOUNTER
Call to pt and wife. They have spoken to PAT staff and verbalized understanding of appt for Bronch Biopsy procedure scheduled for next Wed. Wife advised that pt is stop Eliquis after he takes his pm dose on Geovany evening. Advised to call office with any additional questions.

## 2021-08-27 NOTE — PROGRESS NOTES
[facility]ie 37 PRE-ADMISSION TESTING GENERAL INSTRUCTIONS- [facility]-phone number:[phone]    GENERAL INSTRUCTIONS  [x] Antibacterial Soap shower Night before and/or AM of Surgery  [] Junior wipe instruction sheet and wipes given. [x] Nothing by mouth after midnight, including gum, candy, mints, or water. [x] You may brush your teeth, gargle, but do NOT swallow water. []Hibiclens shower  the night before and the morning of surgery. Do not use             Hibiclens on your face or head. [x]No smoking, chewing tobacco, illegal drugs, or alcohol within 24 hours of your surgery. [x] Jewelry, valuables or body piercing's should not be brought to the hospital. All body and/or tongue piercing's must be removed prior to arriving to hospital.  ALL hair pins must be removed. [x] Do not wear makeup, lotions, powders, deodorant. Nail polish as directed by the nurse. [x] Arrange transportation with a responsible adult  to and from the hospital. If you do not have a responsible adult  to transport you, you will need to make arrangements with a medical transportation company (i.e. [employer]. A Uber/taxi/bus is not appropriate unless you are accompanied by a responsible adult ). Arrange for someone to be with you for the remainder of the day and for 24 hours after your procedure due to having had anesthesia. Who will be your  for transportation? wife    Who will be staying with you for 24 hrs after your procedure?____wife______________  [x] Bring insurance card and photo ID.  [] Transfusion Bracelet: Please bring with you to hospital, day of surgery  [] Bring urine specimen day of surgery. Any small container is acceptable. [x] Use inhalers the morning of surgery and bring with you to hospital.  [] Bring copy of living will or healthcare power of  papers to be placed in your electronic record.   [] CPAP/BI-PAP: Please bring your machine if you are to spend the night in the hospital.     PARKING INSTRUCTIONS:   [x] Arrival Time:__1230, wear mask___________  · [x] Parking lot '\"I\"  is located on [facility] (the corner of [facility] and [facility]). To enter, press the button and the gate will lift. A free token will be provided to exit the lot. One car per patient is allowed to park in this lot. All other cars are to park on [address] [place] [address] either in the parking garage or the handicap lot. [] To reach the [facility] lobby from [address] [place] [address], upon entering the hospital, take elevator B to the 3rd floor. EDUCATION INSTRUCTIONS:      [] Knee or hip replacement booklet & exercise pamphlets given. [] [address]acio 78 placed in chart. [] Pre-admission Testing educational folder given  [] Incentive Spirometry,coughing & deep breathing exercises reviewed. []Medication information sheet(s)   []Fluoroscopy-Xray used in surgery reviewed with patient. Educational pamphlet placed in chart. []Pain: Post-op pain is normal and to be expected. You will be asked to rate your pain from 0-10(a zero is not acceptable-education is needed). Your post-op pain goal is:  [] Ask your nurse for your pain medication. [] Joint camp offered. [] Joint replacement booklets given. [] Other:___________________________    MEDICATION INSTRUCTIONS:   [x]Bring a complete list of your medications, please write the last time you took the medicine, give this list to the nurse. [x] Take the following medications the morning of surgery with 1-2 ounces of water: see list  [x] Stop herbal supplements and vitamins 5 days before your surgery. [] DO NOT take any diabetic medicine the morning of surgery. Follow instructions for insulin the day before surgery. [] If you are diabetic and your blood sugar is low or you feel symptomatic, you may drink 1-2 ounces of apple juice or take a glucose tablet.   The morning of your procedure, you may call the pre-op area if you have concerns about your blood sugar [phone]. [x] Use your inhalers the morning of surgery. Bring your emergency inhaler with you day of surgery. [x] Follow physician instructions regarding any blood thinners you may be taking. WHAT TO EXPECT:  [x] The day of surgery you will be greeted and checked in by the [employer].  In addition, you will be registered in the [facility] by a Patient Access Representative. Please bring your photo ID and insurance card. A nurse will greet you in accordance to the time you are needed in the pre-op area to prepare you for surgery. Please do not be discouraged if you are not greeted in the order you arrive as there are many variables that are involved in patient preparation. Your patience is greatly appreciated as you wait for your nurse. Please bring in items such as: books, magazines, newspapers, electronics, or any other items  to occupy your time in the waiting area. [x]  Delays may occur with surgery and staff will make a sincere effort to keep you informed of delays. If any delays occur with your procedure, we apologize ahead of time for your inconvenience as we recognize the value of your time.

## 2021-08-31 NOTE — TELEPHONE ENCOUNTER
Call to wife to advise change in time for tomorrow's procedure. Advised that pt needs to arrive at hospital at [address] [facility] for procedure time fo 10am now, wife verbalized understanding.

## 2021-09-01 NOTE — PROGRESS NOTES
Patient admitted to [facility] Patient placed on appropriate monitors. Patient assisted with liquids and nutrition. Family in with patient.

## 2021-09-01 NOTE — H&P
Pulmonary [facility]                             Pulmonary Consult/Progress Note :            Reason for Consultation:Lung mass   CC : SOB ,A fib   HPI:   Doing better  No SOB  A fib better controlled       PHYSICAL EXAMINATION:     VITAL SIGNS:  BP (!) 180/81   Pulse 60   Temp 97.8 °F (36.6 °C) (Temporal)   Resp 20   Ht 5' 9\" (1.753 m)   Wt 142 lb (64.4 kg)   SpO2 93%   BMI 20.97 kg/m²   Wt Readings from Last 3 Encounters:   09/01/21 142 lb (64.4 kg)   08/23/21 142 lb (64.4 kg)   07/27/21 142 lb (64.4 kg)     Temp Readings from Last 3 Encounters:   09/01/21 97.8 °F (36.6 °C) (Temporal)   07/27/21 97.4 °F (36.3 °C) (Temporal)   07/01/21 98.4 °F (36.9 °C) (Infrared)     TMAX:  BP Readings from Last 3 Encounters:   09/01/21 (!) 180/81   08/23/21 (!) 142/78   07/27/21 115/64     Pulse Readings from Last 3 Encounters:   09/01/21 60   08/23/21 59   07/27/21 69           INTAKE/OUTPUTS:  No intake/output data recorded.   No intake or output data in the 24 hours ending 09/01/21 1009    General Appearance: alert and oriented to person, place and time, well-developed and   well-nourished, in no acute distress   Eyes: pupils equal, round, and reactive to light, extraocular eye movements intact, conjunctivae normal and sclera anicteric   Neck: neck supple and non tender without mass, no thyromegaly, no thyroid nodules and no cervical adenopathy   Pulmonary/Chest:*rhocnhi bilateral   Cardiovascular: normal rate, regular rhythm, normal S1 and S2, no murmurs, rubs, clicks or gallops, distal pulses intact, no carotid bruits, no murmurs, no gallops, no carotid bruits and no JVD   Abdomen: obese, soft, non-tender, non-distended, normal bowel sounds, no masses or organomegaly   Extremities:edema  Musculoskeletal: normal range of motion, no joint swelling, deformity or tenderness   Neurologic: reflexes normal and symmetric, no cranial nerve deficit noted    LABS/IMAGING:    CBC:  Lab Results   Component Value Date    WBC 10.3 06/24/2021    HGB 16.0 06/24/2021    HCT 50.1 06/24/2021    MCV 97.3 06/24/2021     06/24/2021    LYMPHOPCT 12.8 (L) 06/24/2021    RBC 5.15 06/24/2021    MCH 31.1 06/24/2021    MCHC 31.9 (L) 06/24/2021    RDW 15.3 (H) 06/24/2021    NEUTOPHILPCT 72.0 06/24/2021    MONOPCT 12.6 (H) 06/24/2021    BASOPCT 0.8 06/24/2021    NEUTROABS 7.46 (H) 06/24/2021    LYMPHSABS 1.32 (L) 06/24/2021    MONOSABS 1.30 (H) 06/24/2021    EOSABS 0.07 06/24/2021    BASOSABS 0.08 06/24/2021       No results for input(s): WBC, HGB, HCT, MCV, PLT in the last 720 hours. BMP:   No results for input(s): NA, K, CL, CO2, PHOS, BUN, CREATININE in the last 72 hours. Invalid input(s): CA    MG:   Lab Results   Component Value Date    MG 2.1 06/25/2021     Ca/Phos:   Lab Results   Component Value Date    CALCIUM 8.4 (L) 06/25/2021     Amylase:   Lab Results   Component Value Date    AMYLASE 48 03/18/2016     Lipase:   Lab Results   Component Value Date    LIPASE 29 03/18/2016     LIVER PROFILE:   No results for input(s): AST, ALT, LIPASE, BILIDIR, BILITOT, ALKPHOS in the last 72 hours. Invalid input(s): AMYLASE,  ALB    PT/INR: No results for input(s): PROTIME, INR in the last 72 hours. APTT: No results for input(s): APTT in the last 72 hours. Cardiac Enzymes:  Lab Results   Component Value Date    CKTOTAL 70 03/18/2016    CKMB 2.7 03/18/2016    TROPONINI <0.01 03/18/2016                       PROBLEM LIST:  Patient Active Problem List   Diagnosis    COPD (chronic obstructive pulmonary disease) [facility]    Hyperlipidemia LDL goal <100    Essential hypertension    Congestive heart failure (CHF) (HCC)    Lung mass    Atrial fibrillation (HCC)    Abdominal aortic aneurysm (AAA) without rupture (HCC)    Chronic systolic congestive heart failure (HCC)               ASSESSMENT:  1.)S/P  LEFT LUNG, UPPER LOBE, LOBECTOMY .  ADENOCARCINOMA, MIXED TYPE 2004  2.)  New to lung nodules suggestive for malignancy in the right lung 3.)   COPD  4.)  A. fib  5.)  Possible obstructive sleep apnea  6) heart failure with ejection fraction 40-45    PLAN:    I have no doubt this is malignancy diagnosed  CT guided biopsy shows no answer so we   If IR has concern to do it ,then I will do Navigation bronch   BD  LAMA/LABA  on discharged  . All risks, benefits, alternatives and potential complications explained thoroughly including, but not limited to, bleeding, infection, lung injury, pneumothorax . , heart attack, prolonged ventilation,  and even death, and the patient agrees to proceed. Elias Bone MD,[facility]  Pulmonary&Critical Care Medicine   Director of Lung Nodule Center  Medical Director of [address] [place] [address]    Shana Gracia    NOTE: This report was transcribed using voice recognition software. Every effort was made to ensure accuracy; however, inadvertent computerized transcription errors may be present.

## 2021-09-01 NOTE — ANESTHESIA POSTPROCEDURE EVALUATION
Department of Anesthesiology  Postprocedure Note    Patient: Vianey Lopez  MRN: [id number]  YOB: 1935  Date of evaluation: 9/1/2021  Time:  12:35 PM     Procedure Summary     Date: 09/01/21 Room / Location: [facility] ENDO 03 / [facility]    Anesthesia Start: 1002 Anesthesia Stop: 1200    Procedures:       BRONCHOSCOPY W/EBUS FNA (N/A )      BRONCHOSCOPY ADD ON COMPUTER ASSISTED (N/A )      BRONCHOSCOPY/TRANSBRONCHIAL NEEDLE BIOPSY      BRONCHOSCOPY ALVEOLAR LAVAGE Diagnosis: (LUNG MASS)    Surgeons: William Chun MD Responsible Provider: Cassie Soliman DO    Anesthesia Type: general ASA Status: 4          Anesthesia Type: general    Bang Phase I: Bang Score: 9    Bang Phase II:      Last vitals: Reviewed and per EMR flowsheets.        Anesthesia Post Evaluation    Patient location during evaluation: bedside  Patient participation: complete - patient participated  Level of consciousness: awake  Pain score: 1  Airway patency: patent  Nausea & Vomiting: no vomiting and no nausea  Complications: no  Cardiovascular status: hemodynamically stable  Respiratory status: acceptable  Hydration status: stable

## 2021-09-01 NOTE — OP NOTE
2 [facility] with Navigation bronchoscopy and EBUS   Operative Note      Patient: Whitley Drew  YOB: 1935  MRN: [id number]    Date of Procedure: 9/1/2021    Pre-Op Diagnosis: LUNG MASS    Post-Op Diagnosis: Recurrence of Lung cancer right side       Procedure(s):  BRONCHOSCOPY W/EBUS FNA right hilar mass   BRONCHOSCOPY ADD ON COMPUTER ASSISTED  BRONCHOSCOPY/Fluoroscopy TRANSBRONCHIAL NEEDLE  BIOPSY RUpper Lobe  BRONCHOSCOPY/Fluoroscopy TRANSBRONCHIAL NEEDLE  BIOPSY R lower Lobe  BRONCHOSCOPY ALVEOLAR LAVAGE    Surgeon(s):  Larry Wilkinson MD    Assistant:   * No surgical staff found *    Anesthesia: General    Estimated Blood Loss (mL): Minimal    Complications: None    Specimens:   ID Type Source Tests Collected by Time Destination   1 : RUL Mass Nikolai Bronch BAL Respiratory Lung BODY FLUID CELL COUNT WITH DIFFERENTIAL, CULTURE, FUNGUS, GRAM STAIN, CULTURE WITH SMEAR, ACID FAST BACILLIUS, CULTURE, RESPIRATORY Larry Wilkinson MD 9/1/2021 1121    A :  RUL Mass Nikolai Bronch FNA Tissue Lung CYTOLOGY, NON-GYN Larry Wilkinson MD 9/1/2021 1041    B : RLL Mass Nikolai Bronch FNA Tissue Lung CYTOLOGY, NON-GYN Larry Wilkinson MD 9/1/2021 1059    C : RUL Mass Nikolai Bronch BAL Respiratory Lung CYTOLOGY, NON-GYN Larry Wilkinson MD 9/1/2021 1120    D : Hilar Mass EBUS FNA Tissue Lung CYTOLOGY, NON-GYN Larry Wilkinson MD 9/1/2021 1129          Detailed Description of Procedure:            :  [x] Bronchial sample(s) for      Fungal smear & culture,   Acid-fast bacillus Smear and Culture,    Gram stain, C&S,              Cytology     Description of Procedure: [facility]  and the procedure verified as Flexible Fiberoptic Bronchoscopy with EBUS and Navigation Bronchoscopy         After the patient was controlled with sedation bronchoscope was introduced through ET  without difficulty.  The scope was then passed into the trachea. Additional 2% lidocaine was used topically within the airway. Careful inspection of the tracheal lumen was accomplished. The scope was sequentially passed into all bronchial airways. Endobronchial findings:   Vocal cord :Not seen   Trachea:  Normal mucosa, he the part seen outside the ET tube  Irene  Normal mucosa     Right Main Stem Bronchus  Normal mucosa  Right Upper Lobe Bronchi Normal mucosa ,narrwaing in the apical segment   Right Middle Lobe Bronchi  Normal mucosa  Right Lower Lobe Bronchi (including the Superior segment)  Normal mucosa    Left Main Stem Bronchus Normal mucosa  Left Upper Lobe Bronchus, S/P recestion   Left Upper Lobe Bronchus, Lingula  Normal mucosa  Left Lower Lobe Bronchus (including the Superior segment)        Then Navigation scope was used  Using  and navigation system we target the RUL mass and multiple biopsy was done   Then use LG we did right lowe mass and 3 paases taken    Sine there was no answer then I used EBUS and I was able to locate Right infrahilar mass ,where station R 12-13 and I did 7 passes and confirmed malignancy left side      BAL : RUL    COMPLICATIONS:  Estimated blood loss less than 1 CC    IMPRESSIONS:         RECOMMENDATIONS:   The Patient was taken to the recovery area in satisfactory condition. Await final test/sample results.         Cyril Mendez MD              Electronically signed by Lorrine Klinefelter, MD on 9/1/2021 at 6:56 PM

## 2021-09-01 NOTE — ANESTHESIA PRE PROCEDURE
Department of Anesthesiology  Preprocedure Note       Name:  Jock Hodgkin   Age:  81 y.o.  :  1935                                          MRN:  [id number]         Date:  2021      Surgeon: Deepika Pacheco):  Sarwat Guerrero MD    Procedure: Procedure(s):  BRONCHOSCOPY ENDOBRONCHIAL ULTRASOUND RIGHT LUNG MASS  BRONCHOSCOPY ADD ON COMPUTER ASSISTED    Medications prior to admission:   Prior to Admission medications    Medication Sig Start Date End Date Taking? Authorizing Provider   isosorbide mononitrate (IMDUR) 30 MG extended release tablet Take 2 tablets by mouth daily 21  Yes Andriy Bell MD   PROCTO-MED HC 2.5 % CREA rectal cream APPLY TO AFFECTED AREA TWICE A DAY MAXIMUM OF 2 WEEKS 21  Yes Historical Provider, MD   apixaban (ELIQUIS) 2.5 MG TABS tablet Take 1 tablet by mouth 2 times daily 21  Yes Juan Francisco Hughes MD   dilTIAZem (CARDIZEM CD) 120 MG extended release capsule Take 1 capsule by mouth daily 21  Yes Juan Francisco Hughes MD   bumetanide (BUMEX) 1 MG tablet Take 1 tablet by mouth daily 21  Yes Ryann Cortez MD   metoprolol succinate (TOPROL XL) 100 MG extended release tablet Take 1 tablet by mouth 2 times daily 21  Yes China Nguyen MD   albuterol-ipratropium (COMBIVENT RESPIMAT)  MCG/ACT AERS inhaler Inhale 2 puffs into the lungs every 6 hours as needed for Wheezing Uses bid, may use up to four times daily for sob   Yes Historical Provider, MD   atorvastatin (LIPITOR) 10 MG tablet Take 10 mg by mouth every other day    Yes Historical Provider, MD   Cholecalciferol (VITAMIN D3) 2000 UNITS CAPS Take 2,000 Units by mouth daily     Historical Provider, MD       Current medications:    No current facility-administered medications for this encounter.      Current Outpatient Medications   Medication Sig Dispense Refill    isosorbide mononitrate (IMDUR) 30 MG extended release tablet Take 2 tablets by mouth daily 30 tablet 3    PROCTO-MED HC 2.5 % CREA rectal cream APPLY TO AFFECTED AREA TWICE A DAY MAXIMUM OF 2 WEEKS      apixaban (ELIQUIS) 2.5 MG TABS tablet Take 1 tablet by mouth 2 times daily 60 tablet 5    dilTIAZem (CARDIZEM CD) 120 MG extended release capsule Take 1 capsule by mouth daily 30 capsule 3    bumetanide (BUMEX) 1 MG tablet Take 1 tablet by mouth daily 30 tablet 0    metoprolol succinate (TOPROL XL) 100 MG extended release tablet Take 1 tablet by mouth 2 times daily 60 tablet 3    albuterol-ipratropium (COMBIVENT RESPIMAT)  MCG/ACT AERS inhaler Inhale 2 puffs into the lungs every 6 hours as needed for Wheezing Uses bid, may use up to four times daily for sob      atorvastatin (LIPITOR) 10 MG tablet Take 10 mg by mouth every other day       Cholecalciferol (VITAMIN D3) 2000 UNITS CAPS Take 2,000 Units by mouth daily          Allergies:  No Known Allergies    Problem List:    Patient Active Problem List   Diagnosis Code    COPD (chronic obstructive pulmonary disease) ([facility]) J44.9    Hyperlipidemia LDL goal <100 E78.5    Essential hypertension I10    Congestive heart failure (CHF) ([facility]) I50.9    Lung mass R91.8    Atrial fibrillation (HCC) I48.91    Abdominal aortic aneurysm (AAA) without rupture ([facility]) I71.4    Chronic systolic congestive heart failure (HCC) I50.22       Past Medical History:        Diagnosis Date    [facility]fib [facility]     AAA (abdominal aortic aneurysm) ([facility])     measures 5.0 cm    Cancer ([facility] 2005    left lung    CHF (congestive heart failure) (HCC)     COPD (chronic obstructive pulmonary disease) ([facility]     Heart failure (HCC)     Hyperlipidemia     Hypertension        Past Surgical History:        Procedure Laterality Date    CARDIOVASCULAR STRESS TEST N/A 06/16/2021    Lexiscan stress test    CT NEEDLE BIOPSY LUNG PERCUTANEOUS  7/1/2021    CT NEEDLE BIOPSY LUNG PERCUTANEOUS 7/1/2021 Darrin Nicole MD [facility] CT    LUNG CANCER SURGERY  01/01/2005       Social History:    Social History     Tobacco Use    Smoking status: Former Smoker     Packs/day: 1.00     Years: 10.00     Pack years: 10.00     Types: Cigarettes     Quit date: 2001     Years since quittin.1    Smokeless tobacco: Never Used   Substance Use Topics    Alcohol use: Not Currently     Comment: no ETOH since                                 Counseling given: Not Answered      Vital Signs (Current): There were no vitals filed for this visit. BP Readings from Last 3 Encounters:   21 (!) 142/78   21 115/64   21 120/64       NPO Status:                                                                                 BMI:   Wt Readings from Last 3 Encounters:   21 142 lb (64.4 kg)   21 142 lb (64.4 kg)   21 142 lb (64.4 kg)     There is no height or weight on file to calculate BMI.    CBC:   Lab Results   Component Value Date    WBC 10.3 2021    RBC 5.15 2021    HGB 16.0 2021    HCT 50.1 2021    MCV 97.3 2021    RDW 15.3 2021     2021       CMP:   Lab Results   Component Value Date     2021    K 4.0 2021    K 5.2 2021    CL 97 2021    CO2 31 2021    BUN 40 2021    CREATININE 1.9 2021    GFRAA 41 2021    LABGLOM 34 2021    GLUCOSE 129 2021    PROT 6.6 2021    CALCIUM 8.4 2021    BILITOT 1.3 2021    ALKPHOS 90 2021    AST 30 2021    ALT 19 2021       POC Tests: No results for input(s): POCGLU, POCNA, POCK, POCCL, POCBUN, POCHEMO, POCHCT in the last 72 hours.     Coags:   Lab Results   Component Value Date    PROTIME 12.3 2021    INR 1.1 2021       HCG (If Applicable): No results found for: PREGTESTUR, PREGSERUM, HCG, HCGQUANT     ABGs: No results found for: PHART, PO2ART, EJQ1JXG, MOP8FBV, BEART, O3WXTMEQ     Type & Screen (If Applicable):  No results found for: LABABO, LABRH    Drug/Infectious Status (If (Former)                          ROS comment: Pleural effusion  PE comment: Coarse Cardiovascular:  Exercise tolerance: poor (<4 METS),   (+) hypertension:, valvular problems/murmurs: AI and MR, dysrhythmias: atrial fibrillation, CHF: systolic, orthopnea, RICH: after ambulating 1 flight of stairs, murmur (Grade II), hyperlipidemia      ECG reviewed  Rhythm: irregular  Rate: normal  Echocardiogram reviewed         Beta Blocker:  Dose within 24 Hrs      ROS comment: EKG:  Undetermined   Bradycardia   BORDERLINE RHYTHM    Summary  Ejection fraction is visually estimated at 50%. No evidence of thrombus within left atrium or appendage. Mild-moderate mitral regurgitation. Mild aortic valve regurgitation. Moderate tricuspid regurgitation. Neuro/Psych:   Negative Neuro/Psych ROS              GI/Hepatic/Renal:   (+) renal disease (40/1.9 with a GFR of 34): CRI,          ROS comment: BPH. Endo/Other:    (+) blood dyscrasia (Eliquis LD Sunday): anticoagulation therapy:., malignancy/cancer (Pulmonary mass). Pt had no PAT visit       Abdominal:             Vascular:           ROS comment: 5cm AAA. Other Findings:           Anesthesia Plan      general     ASA 4     (TIVA  Pt. Considered high risk for a low risk procedure --> no guarantees implied or stated.)  Induction: intravenous. MIPS: Prophylactic antiemetics administered. Anesthetic plan and risks discussed with patient and spouse. Plan discussed with SARIKA.                 Anatoly Grimes DO   9/1/2021

## 2021-09-03 NOTE — TELEPHONE ENCOUNTER
----- Message from Blair Adan MD sent at 9/2/2021  4:29 PM EDT -----  Regarding: FW: CTA CORONARY WITH EJECTION FRACTION  Please notify patient of coronary CT angiogram is being canceled because of his kidney disease and high level of coronary calcium in the heart. I tried to call patient to reassess symptoms but patient could not be reached. Please arrange to have patient follow-up in 4 to 8 weeks to discuss and monitor for symptoms. If patient is having worsening symptoms then he should let us know so we can arrange for invasive coronary angiogram.  ----- Message -----  From: Stefania Funk  Sent: 8/27/2021   2:38 PM EDT  To: Blair Adan MD  Subject: FW: CTA CORONARY WITH EJECTION FRACTION          8/27/21  DR. CHRISTY:  pLEASE SEE DR. Tripp Gustafson NOTE BELOW REGARDING Donell Omalley    ----- Message -----  From: Ira Martínez MD  Sent: 8/26/2021   5:41 PM EDT  To: Stefania Funk  Subject: RE: CTA CORONARY WITH EJECTION FRACTION            D/w  Ray, he is not a candidate for Coronary CTA, his creat 1.9, he is 81 and has high CAC score on non contrast chest CT.    ----- Message -----  From: Stefania Blessing  Sent: 8/26/2021   9:16 AM EDT  To: Ira Martínez MD, Lauryn Moyer, #  Subject: CTA CORONARY WITH EJECTION FRACTION              CTA CORONARY WITH EJECTION FRACTION  ORDERED BY DR. Canda Goldmann  [phone]  ORDERS HAVE BEEN SENT TO YOUR EMAIL

## 2021-09-03 NOTE — TELEPHONE ENCOUNTER
Spoke with patient's wife regarding cancellation of CTA. She verbalized understanding and will inform patient. Patient scheduled for OV on 11/4/2021.

## 2021-09-07 NOTE — TELEPHONE ENCOUNTER
Call to pt and spoke with wife re: oncology referral. Pt wife states that Deb Uribe was seen in [facility] for his previous Lung Cancer and was not given any chemo or need for oncology locally. Wife prefers to have referral to [facility]. Referral per Dr. Saritha Burgos's orders to Dr. Tee Swain to be sent this afternoon. Advised wife that she should hear form their office sometime with in the next week and to call if she does not hear from anyone in Medical Oncology before end of next week. Referral faxed this afternoon. VM left for Marylu BALLARD Medical Oncology re: referral and history within [facility] records found in care everywhere.

## 2021-09-07 NOTE — TELEPHONE ENCOUNTER
Call placed to inquire if pt has established oncologist; left VM for wife/pt to call back with info.

## 2021-09-08 NOTE — CARE COORDINATION
[facility] Transitions Follow Up Call    2021    Patient: Zana Mccarthy  Patient : 1935   MRN: [id number]  Reason for Admission:   Discharge Date: 21 RARS: Readmission Risk Score: 12       Spoke with: Wife  Attempted to contact patient for follow up BPCI-A transition call. Patients wife states he is coming along. Not much has changed. He has several test he has to get done this month. He will be getting a bone scan, mri, and a pet scan. He is eating and drinking good. He is urinating fine. He had some constipation today. He has taken medication for that. She denies pain, swelling, cp, chills, fever, or cough. Takes his medication as prescribed. No questions, needs or concerns at this time. Will continue to follow. Care Transitions Subsequent and Final Call    Subsequent and Final Calls  Care Transitions Interventions    Registered Dietician: Declined    Other Interventions:            Follow Up  Future Appointments   Date Time Provider Roberto Penn   2021 10:00 AM SEB PET INJECTION ROOM [facility] PET IMANI Radiolog   2021 11:00 AM SEB PET SCAN [facility] PET SEB Radiolog   2021  9:00 AM [facility] NM DOSE RM [facility] NM [facility] Radiolo   2021 11:00 AM MRI [facility] MOBILE [facility] MRI [facility] Radiolo   2021 12:00 PM [facility] NM ECAM [facility] NM [facility] Radiolo   2021  8:30 AM Maynor Baxter MD MED ONC [facility]   2021  8:30 AM [facility] MED ONC FAST TRACK 2 [facility] Med Onc [facility]   2021 11:30 AM Radha Bell MD [address] Андрей Hagan LPN

## 2021-09-15 NOTE — CARE COORDINATION
[facility] Transitions Follow Up Call    9/15/2021    Patient: Izabela Hernandes  Patient : 1935   MRN: [id number]  Reason for Admission:   Discharge Date: 21 RARS: Readmission Risk Score: 12       Attempted to contact patient for follow up BPCI-A transition call. Left voicemail message to return call and stated this is the final call we will be making. If you have any health concerns or problems arise please call your PCP to schedule an appointment. Contact information provided. No further outreach scheduled. Care Transitions Subsequent and Final Call    Subsequent and Final Calls  Care Transitions Interventions    Registered Dietician: Declined    Other Interventions:            Follow Up  Future Appointments   Date Time Provider Roberto Penn   2021 10:00 AM SEB PET INJECTION ROOM [facility] PET IMANI Radiolog   2021 11:00 AM SEB PET SCAN [facility] PET SEB Radiolog   2021  9:00 AM [facility] NM DOSE RM [facility] NM [facility] Radiolo   2021 11:00 AM MRI [facility] MOBILE [facility] MRI [facility] Radiolo   2021 12:00 PM [facility] NM ECAM [facility] NM [facility] Radiolo   2021  8:30 AM Jatinder Kinney MD MED ONC [facility]   2021  8:30 AM [facility] MED ONC FAST TRACK 2 [facility] Med Onc [facility]   2021 11:30 AM Esmer Bell MD [address] [facility]       Garland Payne LPN

## 2021-09-21 NOTE — TELEPHONE ENCOUNTER
Cesilia Mckeon from MRI called said the patient is sched on Thursday 09/23/2021 on the mobile. He had labs done Sept 1st and GRF was 39 the previous two times before that they were 32, and 34. Do you want with or without  contrast still ? New labs? Or rescheduled? Call back number [phone] Please advise.

## 2021-09-28 NOTE — TELEPHONE ENCOUNTER
Met with patient and wife during his initial consultation with Dr. Jessie Rausch  for his Lung cancer diagnosis. Introduced myself and explained my role with patients receiving treatment at our center. Patient was friendly and receptive. He has a history of Lung Adenocarcinoma treated with Lobectomy in 2004 at [facility]. He was recently hospitalized and workup noted lung nodules. He had a bronchoscopy that confirmed non small cell Lung Carcinoma. Copy of his pathology findings given including cancer type. Instructed on next steps including referral to Radiation Oncology and follow up per Dr. Stuart Duran's recommendations and follow up care. Provided patient with  literature  on Patient Resource Lung Cancer and Community Transportation Resource List. Reviewed resources available including Social Work, Dietician, and Financial Navigator. Provided with my contact information and instructed patient to call me with questions or concerns. Verbalizes understanding. Patient appreciative of visit. Will continue to follow.

## 2021-09-28 NOTE — PROGRESS NOTES
[address] [facility] MED [facility]OG[facility]  [address] [place] [id number]  Dept: [phone]  Attending Consult Note      Reason for Visit: Non-small cell lung cancer. Referring Physician:  Tessa Otto MD    PCP:  Gamaliel Li MD    History of Present Illness:      Mr. Kizzy Phillip is a very pleasant 81-year-old gentleman with a past medical history significant for A. 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, status post left upper lobectomy and thoracic lymphadenopathy on 10/29/2004, who was found to have on CT scan of the chest from 6/16/2021 a spiculated right suprahilar mass measuring 3.3 x 2.8 cm, a lobulated spiculated mass in the periphery of the right lower lobe measuring 3.1 x 2.6 cm, undefined; groundglass opacity in the left upper lobe measuring 1.3 x 1.1 cm, few other scattered tiny nodular densities, bilateral pleural effusions, the patient underwent on 7/1/2021 a CT-guided lung biopsy, pathology was consistent with scar tissue with focal epithelial atypia, patient underwent on 1/1/2021 bronchoscopy/EBUS by Dr. Balbir Murrieta, biopsies were taken from the right hilar mass, station [id number], biopsy of the hilar mass was positive for malignant cells, non-small cell carcinoma, compatible with non small cell carcinoma could not be determined from the specimen. The patient had a PET scan done on 9/20/2021, revealing metabolic activity associated with a nodular opacity in the medial right upper lobe, compatible with history of lung cancer.  This extends into the right hilar region.  The adjacent ground-glass opacity could be postobstructive changes or potentially related to mild neoplastic extension. Nodular opacity in the lateral right lower lobe is decreasing in size compared to the prior CT scan and has some associated metabolic activity.  The appearance as well as the interval improvement could be due to an Member of Clubs or Organizations:     Attends Club or Organization Meetings:     Marital Status:    Intimate Partner Violence:     Fear of Current or Ex-Partner:     Emotionally Abused:     Physically Abused:     Sexually Abused: Allergies:  No Known Allergies    Physical Exam:  BP (!) 182/86   Pulse 57   Resp 14   Ht 5' 9\" (1.753 m)   Wt 145 lb (65.8 kg)   SpO2 99%   BMI 21.41 kg/m²   GENERAL: Alert, oriented x 3, not in acute distress. HEENT: PERRLA; EOMI. Oropharynx clear. NECK: Supple. No palpable cervical or supraclavicular lymphadenopathy. LUNGS: Decreased air entry bilaterally. CARDIOVASCULAR: Regular rhythm. ABDOMEN: Soft. Non-tender, non-distended. Positive bowel sounds. EXTREMITIES: Without clubbing, cyanosis, or edema. NEUROLOGIC: No focal deficits. ECOG PS 1       Impression/Plan:       Mr. Lonnie Aguilar is a very pleasant 81-year-old gentleman with a past medical history significant for A. fib, AAA, CHF, COPD, hyperlipidemia, hypertension, and stage IA, [id number] adenocarcinoma of the left upper lobe lung, status post left upper lobectomy and thoracic lymphadenopathy on 10/29/2004, who was found to have on CT scan of the chest from 6/16/2021 a spiculated right suprahilar mass measuring 3.3 x 2.8 cm, a lobulated spiculated mass in the periphery of the right lower lobe measuring 3.1 x 2.6 cm, undefined; groundglass opacity in the left upper lobe measuring 1.3 x 1.1 cm, few other scattered tiny nodular densities, bilateral pleural effusions, the patient underwent on 7/1/2021 a CT-guided lung biopsy, pathology was consistent with scar tissue with focal epithelial atypia, patient underwent on 1/1/2021 bronchoscopy/EBUS by Dr. Nelia Hopkins, biopsies were taken from the right hilar mass, station [id number], biopsy of the hilar mass was positive for malignant cells, non-small cell carcinoma, compatible with non small cell carcinoma could not be determined from the specimen.   The patient had a

## 2021-09-28 NOTE — PROGRESS NOTES
Maryam Genoveva  1935 81 y.o. Referring Physician: Dr Ayanna Coe    PCP: Angie Wei MD    Vitals:    09/28/21 0902   BP: (!) 182/86   Pulse: 57   Resp: 14   SpO2: 99%        Wt Readings from Last 3 Encounters:   09/28/21 145 lb (65.8 kg)   09/01/21 142 lb (64.4 kg)   08/23/21 142 lb (64.4 kg)        Body mass index is 21.41 kg/m². Chief Complaint:   Chief Complaint   Patient presents with    New Patient         Cancer Staging  No matching staging information was found for the patient. Prior Radiation Therapy? NO    Concurrent Chemo/radiation? NO    Prior Chemotherapy? NO    Prior Hormonal Therapy? NO    Head and Neck Cancer? No, patient does NOT have HN cancer.         Current Outpatient Medications:     isosorbide mononitrate (IMDUR) 30 MG extended release tablet, Take 2 tablets by mouth daily, Disp: 30 tablet, Rfl: 3    apixaban (ELIQUIS) 2.5 MG TABS tablet, Take 1 tablet by mouth 2 times daily, Disp: 60 tablet, Rfl: 5    dilTIAZem (CARDIZEM CD) 120 MG extended release capsule, Take 1 capsule by mouth daily, Disp: 30 capsule, Rfl: 3    bumetanide (BUMEX) 1 MG tablet, Take 1 tablet by mouth daily, Disp: 30 tablet, Rfl: 0    metoprolol succinate (TOPROL XL) 100 MG extended release tablet, Take 1 tablet by mouth 2 times daily, Disp: 60 tablet, Rfl: 3    albuterol-ipratropium (COMBIVENT RESPIMAT)  MCG/ACT AERS inhaler, Inhale 2 puffs into the lungs every 6 hours as needed for Wheezing Uses bid, may use up to four times daily for sob, Disp: , Rfl:     atorvastatin (LIPITOR) 10 MG tablet, Take 10 mg by mouth every other day , Disp: , Rfl:     Cholecalciferol (VITAMIN D3) 2000 UNITS CAPS, Take 2,000 Units by mouth daily , Disp: , Rfl:     PROCTO-MED HC 2.5 % CREA rectal cream, APPLY TO AFFECTED AREA TWICE A DAY MAXIMUM OF 2 WEEKS (Patient not taking: Reported on 9/28/2021), Disp: , Rfl:        Past Medical History:   Diagnosis Date    [facility]     AAA [facility]abdominal aortic aneurysm) ([facility]     measures 5.0 cm    Cancer [facility])     left lung    CHF (congestive heart failure) ([facility]     COPD (chronic obstructive pulmonary disease) (HCC)     Heart failure (HCC)     Hyperlipidemia     Hypertension        Past Surgical History:   Procedure Laterality Date    BRONCHOSCOPY N/A 2021    BRONCHOSCOPY W/EBUS FNA performed by Cyril Mendez MD at [address] [place] [address] N/A 2021    BRONCHOSCOPY ADD ON COMPUTER ASSISTED performed by Cyril Mendez MD at [address] [facility], [place], operated by [facility]  2021    BRONCHOSCOPY/TRANSBRONCHIAL NEEDLE BIOPSY performed by Cyril Mendez MD at [address] [place] [address]  2021    BRONCHOSCOPY ALVEOLAR LAVAGE performed by Cyril Mendez MD at [address] TEST N/A 2021    Lexiscan stress test    CT NEEDLE BIOPSY LUNG PERCUTANEOUS  2021    CT NEEDLE BIOPSY LUNG PERCUTANEOUS 2021 Thanh Alvarez MD [facility]    LUNG CANCER SURGERY  2005       History reviewed. No pertinent family history.     Social History     Socioeconomic History    Marital status:      Spouse name: Not on file    Number of children: Not on file    Years of education: Not on file    Highest education level: Not on file   Occupational History    Not on file   Tobacco Use    Smoking status: Former Smoker     Packs/day: 1.00     Years: 10.00     Pack years: 10.00     Types: Cigarettes     Quit date: 2001     Years since quittin.2    Smokeless tobacco: Never Used   Vaping Use    Vaping Use: Never used   Substance and Sexual Activity    Alcohol use: Not Currently     Comment: no ETOH since     Drug use: Never    Sexual activity: Not on file   Other Topics Concern    Not on file   Social History Narrative    Not on file     Social Determinants of Health     Financial Resource Strain:     Difficulty of Paying Living Expenses:    Food Insecurity:     Worried About Running Out of Food in the diagnosis of head and neck cancer? 0- No                                                                                    TOTAL 3        Score of 0-1: No action  Score 2 or greater:  · For Non-Diabetic Patient: Recommend adding Ensure Enlive 2 x daily and provide patient with Ensure wellness bag with coupons  · For Diabetic Patient: Recommend adding Glucerna Shake 2 x daily and provide patient with Glucerna Wellness bag with coupons  · Route to the dietitian via [address]    · Are you having  difficulty performing daily routine tasks  due to fatigue or weakness (ie: bathing/showering, dressing, housework, meal prep, work, child Darnella Corn): No     · Do you have any arm flexibility/ROM restrictions, swelling or pain that limit activity: No     · Any changes in memory, attention/focus that impact daily activities: No     · Do you avoid participation in leisure/social activity due weakness, fatigue or pain: No     ARE ANY OF THE ABOVE ARE ANSWERED YES: No          PT ASSESSMENT FOR REFERRAL    · Have you had any recent falls in past 2 months: No     · Do you have difficulty  going up/down stairs: Yes     · Are you having difficulty walking: No     · Do you often hold onto furniture/environmental supports or feel off balance when you are walking: No     · Do you need to take rest breaks when you are walking: Yes     · Any pain on scale of 1-10 that limits your mobility: No 0/10    ARE ANY OF THE ABOVE ARE ANSWERED YES: Yes - but NO PT referral request sent due to Not at this time. PREHAB AUDIOLOGY REFERRAL    - Is patient planned to receive Cisplatin? No. This patient is not planned to start Cisplatin. - Is patient complaining of new onset hearing loss? No. Patient is not complaining of new onset hearing loss.         Mj Rubio RN

## 2021-10-04 NOTE — PROGRESS NOTES
Radiation Oncology      [facility]. Jacklyn Stark 51      Referring Physician: Dr. Mike White. Reji      Primary Care [address] [place]   Primary Oncologist: Dr. Mike White. Reji      Diagnosis: cT2 cN1 cMo RML NSCLC      Service:  Radiation Oncology consultation performed on 10/4/21        HPI:        Nelly Kent is a pleasant and functionally intact 81year old with NSCLC. He has a history of left sided lung adenocarcinoma and had a left lobectomy at the [facility] in 2004. He was recently fishing and was complaining of SOB and bilateral extremity swelling,  and has had multiple scans since then. On 6/16/2021 ct chest presented spiculated right suprahilar mass measuring 3.3x2.8cm with another lobulated spiculated mass in periphery of the RLL 3.1x2.6cm ill defined somewhat ground glass opacity RLL 1.3x 1.1cm. 7/1/2021 right lung CT guided bx by Dr. Feliberto Cleveland and then a FNA 9/1/2021 demonstrated non small cell carcinoma. 8/24/2021 CT Chest showed little change to right upper lobe or the right hilar mass, with mild enlargement of the peripheral lower lobe lung mass. PET scan 5/33/1263 revealed metabolic activity associated with a nodular opacity in the medial right upper lobe, compatible with history of lung cancer.  This extends into the right hilar region.  The adjacent ground-glass opacity could be postobstructive changes or potentially related to mild neoplastic extension. Nodular opacity in the lateral right lower lobe was decreasing in size compared to the prior CT scan but was PET avid. The appearance as well as the interval improvement could be due to an infectious etiology including atypical infections.  While this could also be partly related to malignancy, the interval improved appearance would be unusual for malignancy without treatment per chart review. Bone scan / MRI brain done 9/23/2021.  Patient is seeing Dr. Angus Cross for medical the specimen. The patient had a PET scan done on 9/20/2021, revealing metabolic activity associated with a nodular opacity in the medial right upper lobe, compatible with history of lung cancer.  This extends into the right hilar region.  The adjacent ground-glass opacity could be postobstructive changes or potentially related to mild neoplastic extension. Nodular opacity in the lateral right lower lobe is decreasing in size compared to the prior CT scan and has some associated metabolic activity. The appearance as well as the interval improvement could be due to an infectious etiology including atypical infections.  While this could also be partly related to malignancy, the interval improved appearance would be unusual for malignancy without treatment. Brain MRI from 1/23/2021 was negative for metastatic disease to the brain.       The patient has a newly diagnosed non-small cell lung cancer, could not be determined whether it is adenocarcinoma or squamous cell carcinoma, pathology will be sent to [facility] for a second opinion. He has clinical stage T2N1M0 (nodular opacity in the right lower lobe is decreasing in size, this is likely of benign etiology, if malignant would be T4). The PET scan images were reviewed with the patient and his spouse. I discussed with them his diagnosis, prognosis and recommendations for treatment. I offered the patient evaluation by thoracic surgery, the patient is not interested in any surgical interventions, which is very reasonable at his age and due to his comorbidities. Concurrent chemoradiation is recommended, followed by adjuvant immunotherapy, durvalumab.   Referral was placed to Rad Onc.       -----        Past Medical History:   Diagnosis Date    A-fib [facility])     AAA (abdominal aortic aneurysm) ([address]     measures 5.0 cm    Cancer ([address] 2005    left lung    CHF (congestive heart failure) ([address]     COPD (chronic obstructive pulmonary disease) ([address]     Heart failure ([address]  Hyperlipidemia     Hypertension        Past Surgical History:   Procedure Laterality Date    BRONCHOSCOPY N/A 9/1/2021    BRONCHOSCOPY W/EBUS FNA performed by Nicolas Hamm MD at [address] [facility] N/A 9/1/2021    BRONCHOSCOPY ADD ON COMPUTER ASSISTED performed by Nicolas Hamm MD at [address] [facility]  9/1/2021    BRONCHOSCOPY/TRANSBRONCHIAL NEEDLE BIOPSY performed by Nicolas Hamm MD at [address] [facility]  9/1/2021    BRONCHOSCOPY ALVEOLAR LAVAGE performed by Nicolas Hamm MD at [address] TEST N/A 06/16/2021    Lexiscan stress test    COLONOSCOPY      years ago    CT NEEDLE BIOPSY LUNG PERCUTANEOUS  7/1/2021    CT NEEDLE BIOPSY LUNG PERCUTANEOUS 7/1/2021 Gaby Cortez MD [facility]    LUNG CANCER SURGERY  01/01/2005       Family History   Problem Relation Age of Onset    Cancer Sister         melanoma    Breast Cancer Sister     Heart Disease Brother        Current Outpatient Medications   Medication Sig Dispense Refill    isosorbide mononitrate (IMDUR) 30 MG extended release tablet Take 2 tablets by mouth daily 30 tablet 3    PROCTO-MED HC 2.5 % CREA rectal cream APPLY TO AFFECTED AREA TWICE A DAY MAXIMUM OF 2 WEEKS (Patient not taking: Reported on 9/28/2021)      apixaban (ELIQUIS) 2.5 MG TABS tablet Take 1 tablet by mouth 2 times daily 60 tablet 5    dilTIAZem (CARDIZEM CD) 120 MG extended release capsule Take 1 capsule by mouth daily 30 capsule 3    bumetanide (BUMEX) 1 MG tablet Take 1 tablet by mouth daily 30 tablet 0    metoprolol succinate (TOPROL XL) 100 MG extended release tablet Take 1 tablet by mouth 2 times daily 60 tablet 3    albuterol-ipratropium (COMBIVENT RESPIMAT)  MCG/ACT AERS inhaler Inhale 2 puffs into the lungs every 6 hours as needed for Wheezing Uses bid, may use up to four times daily for sob      atorvastatin (LIPITOR) 10 MG tablet Take 10 mg by mouth every other day       Cholecalciferol (VITAMIN D3) 2000 UNITS CAPS Take 2,000 Units by mouth daily        No current facility-administered medications for this encounter. No Known Allergies    Social History     Socioeconomic History    Marital status:      Spouse name: None    Number of children: 0    Years of education: None    Highest education level: None   Occupational History    None   Tobacco Use    Smoking status: Former Smoker     Packs/day: 1.00     Years: 10.00     Pack years: 10.00     Types: Cigarettes     Quit date: 2001     Years since quittin.2    Smokeless tobacco: Never Used   Vaping Use    Vaping Use: Never used   Substance and Sexual Activity    Alcohol use: Not Currently     Comment: no ETOH since     Drug use: Never    Sexual activity: None   Other Topics Concern    None   Social History Narrative    None     Social Determinants of Health     Financial Resource Strain:     Difficulty of Paying Living Expenses:    Food Insecurity:     Worried About Running Out of Food in the Last Year:     Ran Out of Food in the Last Year:    Transportation Needs:     Lack of Transportation (Medical):      Lack of Transportation (Non-Medical):    Physical Activity:     Days of Exercise per Week:     Minutes of Exercise per Session:    Stress:     Feeling of Stress :    Social Connections:     Frequency of Communication with Friends and Family:     Frequency of Social Gatherings with Friends and Family:     Attends Judaism Services:     Active Member of Clubs or Organizations:     Attends Club or Organization Meetings:     Marital Status:    Intimate Partner Violence:     Fear of Current or Ex-Partner:     Emotionally Abused:     Physically Abused:     Sexually Abused:            Review of Systems - History obtained from chart review and the patient  General ROS: positive for  - fatigue  Psychological ROS: negative  Ophthalmic ROS: negative  ENT ROS: negative  Allergy and Immunology ROS: negative  Hematological and Lymphatic ROS: negative  Endocrine ROS: negative  Respiratory ROS: SOB with exertion  Cardiovascular ROS: negative  Gastrointestinal ROS: no abdominal pain, change in bowel habits, or black or bloody stools  Genito-Urinary ROS: no dysuria, trouble voiding, or hematuria  Musculoskeletal ROS: negative  Neurological ROS: no TIA or stroke symptoms  Dermatological ROS: negative        Physical Exam  HENT:      Head: Normocephalic and atraumatic. Right Ear: External ear normal.      Left Ear: External ear normal.      Nose: Nose normal.      Mouth/Throat:      Mouth: Mucous membranes are moist.   Eyes:      Pupils: Pupils are equal, round, and reactive to light. Cardiovascular:      Rate and Rhythm: Normal rate and regular rhythm. Pulses: Normal pulses. Pulmonary:      Effort: Pulmonary effort is normal.      Breath sounds: Normal breath sounds. Abdominal:      General: Abdomen is flat. Palpations: Abdomen is soft. Musculoskeletal:         General: Normal range of motion. Cervical back: Normal range of motion. Skin:     General: Skin is warm and dry. Neurological:      General: No focal deficit present. Mental Status: He is alert and oriented to person, place, and time. Psychiatric:         Mood and Affect: Mood normal.         Behavior: Behavior normal.         Thought Content:  Thought content normal.         Judgment: Judgment normal.             Imaging reviewed:      PET 9/20/21:  Impression   1.  Metabolic activity associated with a nodular opacity in the medial right   upper lobe, compatible with history of lung cancer.  This extends into the   right hilar region.  The adjacent ground-glass opacity could be   postobstructive changes or potentially related to mild neoplastic extension.       2.  Nodular opacity in the lateral right lower lobe is decreasing in size   compared to the prior CT scan and has some associated metabolic activity. The appearance as well as the interval improvement could be due to an   infectious etiology including atypical infections.  While this could also be   partly related to malignancy, the interval improved appearance would be   unusual for malignancy without treatment.       RECOMMENDATIONS:   5 cm infrarenal abdominal aortic aneurysm. Recommend follow-up every 6 months   and vascular consultation. Radiation Safety and Treatment Support:  -previous Radiation history: No  -history of connective tissue disease: No  -history of autoimmune disease: No  -pregnant: not applicable  -fertility conservation and /or contraception discussed: no  -nutrition consult prior to [address]: Yes  -PEG: No  -Dental evaluation prior to treatment:No  -Social Work requested: Yes  -Oncology Nurse Navigator requested: Yes  -pre + post treatment PT / Rehab / PM+R evaluation considered: Yes  -ICD: No   -ICD brand: -  -[facility] patient navigator: Timothy Fitzpatrick  -Nurse Practitioners for Radiation Oncology:    ---Marlon Avendaño, MSN, RN, FNP-C   ---Yaritza Cardoza, MSN, RN, FNP-BC        Assessment and Plan: Kanwal Mtz is a pleasant and cooperative 81year old with a recent diagnosis of AJCC stage group II- 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 We recommend a concurrent approach to definitive management of this locally advanced non small cell lung cancer, biopsy proven NSCLC [chemo dosing per [address] record- see EMR]. Based on the clinical characteristic, this disease and stage is generally considered unresectable; optimal therapy tends to be a concurrent chemo-RT approach. Concomitant chemo-RT compared with sequential chemo-RT improved overall survival, primarily through better locoregional control, at the cost of manageable increases in acute esophageal toxicity as based on the [place] metanalysis.  These data demonstrate a benefit of concomitant chemo-RT over sequential chemo->RT on OS (HR 0.84, SS), with absolute benefit at 3-years of 5.7% (18% to 24%), 5-years 4.5% (11% to 15%). Interestingly, there was no difference in PFS (HR 0.9, p=0.07). However a decrease in locoregional progression (HR 0.777, SS), with absolute decrease of 6% at 5 years (35% to 29%) was shown. There was no difference on distant progression (HR 1.04, NS), with 5-year rate of about 40% Anthony Solares Oncol 2010 May 1;28(13):3060-8924). Regarding the radiation dose, 60 Gy in 2Gy/fx was established long ago in RTOG 73-01, with several other trials showing a feasibility of much higher doses however with RT alone. Interestingly in the concurrent era, RTOG 0617 tested higher dose arms (Concurrent RT + Carbo/Taxol +/- Cetuximab) these were closed early with \"negative\" results (longer term results and POFA needed), therefor 60 Gy in daily fractions with dual agent chemotherapy can be considered the standard (the Winnie and LAMP trials also assisted in the development of this paradigm). Fractionated external beam radiation therapy may or may not be delivered with intensity modulation +/- image guidance per daily cone beam depending on the specific patient anatomy and dose constraints as described per the RTOG and QUANTEC ---Beverly Castro, Int J Radiat Oncol Biol Phys. 2010 Mar 1;76(3 Suppl):S10-9. The risks, benefits, alternatives, process and logistics of external beam radiation were reviewed (risks include but are not limited to worsening PULM function, fibrosis, esophagitis, esophageal structure, perforations, bleeding, paralysis and death). We answered all of the patient's questions to the best of our ability. Mahin Madera verbalized understanding and seemed satisfied. Radiation planning will commence within 3 days; the next step in management being the simulation scan, with external beam radiation to commence in a timely fashion thereafter. It was a pleasure meeting Mahin Madera today and we appreciate the referral and opportunity to be involved in his care.   We had an extensive discussion today regarding the course to date (including a focused review of theapplicable radiographic and laboratory information), multidisciplinary approach to cancer care, and indications for external beam radiation therapy as a component therein. A literature review and multidisciplinary discussion was performed after seeing this patient due to the complexity of the medical decision making in this case. I personally spent greater than 80 minutes on this case and with this patient. I performed the complete history and physical as above at today's visit, at least 45 minutes was in direct discussion and  regarding disease management. -RUL + - rec CRT  -sim    -----    Addendum 10/5/21:    Case discussed with Dr. Rylee Henry. Will treat this pt as T2 N1 and closely observe the RLL lesion with biopsy and SBRT should it progress - 10/5/21.      -----        Román Mustafa. Rahul Lopez MD [place] [address] Oncology  Cell: [phone]    [facility]:  [facility]: [phone]  [address] [place] [address] Street:  [phone]   FAX:    [phone]  [address] [place] [address] Road:  [phone]   FAX:  [phone]        NOTE: This report was transcribed using voice recognition software. Every effort was made to ensure accuracy; however, inadvertent computerized transcription errors may be present.

## 2021-10-04 NOTE — PROGRESS NOTES
Chrissie Lomas  1935 81 y.o. Referring Physician: Dr. Hany Saldivar    PCP: Elena Alvarez MD     Vitals:    10/04/21 1452   BP: 124/84   Pulse: 74   Resp: 18   Temp: 97.1 °F (36.2 °C)   SpO2: 94%        Wt Readings from Last 3 Encounters:   10/04/21 143 lb 11.2 oz (65.2 kg)   09/28/21 145 lb (65.8 kg)   09/01/21 142 lb (64.4 kg)        Body mass index is 21.22 kg/m². Chief Complaint: No chief complaint on file. Cancer Staging  No matching staging information was found for the patient. Prior Radiation Therapy? NO    Concurrent Chemo/radiation? NO    Prior Chemotherapy? NO    Prior Hormonal Therapy? No      Head and Neck Cancer? No, patient does NOT have HN cancer. Current Outpatient Medications   Medication Sig Dispense Refill    isosorbide mononitrate (IMDUR) 30 MG extended release tablet Take 2 tablets by mouth daily 30 tablet 3    PROCTO-MED HC 2.5 % CREA rectal cream APPLY TO AFFECTED AREA TWICE A DAY MAXIMUM OF 2 WEEKS (Patient not taking: Reported on 9/28/2021)      apixaban (ELIQUIS) 2.5 MG TABS tablet Take 1 tablet by mouth 2 times daily 60 tablet 5    dilTIAZem (CARDIZEM CD) 120 MG extended release capsule Take 1 capsule by mouth daily 30 capsule 3    bumetanide (BUMEX) 1 MG tablet Take 1 tablet by mouth daily 30 tablet 0    metoprolol succinate (TOPROL XL) 100 MG extended release tablet Take 1 tablet by mouth 2 times daily 60 tablet 3    albuterol-ipratropium (COMBIVENT RESPIMAT)  MCG/ACT AERS inhaler Inhale 2 puffs into the lungs every 6 hours as needed for Wheezing Uses bid, may use up to four times daily for sob      atorvastatin (LIPITOR) 10 MG tablet Take 10 mg by mouth every other day       Cholecalciferol (VITAMIN D3) 2000 UNITS CAPS Take 2,000 Units by mouth daily        No current facility-administered medications for this encounter.        Past Medical History:   Diagnosis Date    A-fib [facility])     AAA (abdominal aortic aneurysm) ([address]     measures 5.0 cm    Cancer [facility])     left lung    CHF (congestive heart failure) (HCC)     COPD (chronic obstructive pulmonary disease) (HCC)     Heart failure (HCC)     Hyperlipidemia     Hypertension        Past Surgical History:   Procedure Laterality Date    BRONCHOSCOPY N/A 2021    BRONCHOSCOPY W/EBUS FNA performed by Simin Reyes MD at [address] [facility] N/A 2021    BRONCHOSCOPY ADD ON COMPUTER ASSISTED performed by Simin Reyes MD at [id number] [place] [address]  2021    BRONCHOSCOPY/TRANSBRONCHIAL NEEDLE BIOPSY performed by Simin Reyes MD at [address] [place] [address]  2021    BRONCHOSCOPY ALVEOLAR LAVAGE performed by Simin Reyes MD at [address] TEST N/A 2021    Lexiscan stress test    COLONOSCOPY      years ago    CT NEEDLE BIOPSY LUNG PERCUTANEOUS  2021    CT NEEDLE BIOPSY LUNG PERCUTANEOUS 2021 Madeleine Singh MD [facility]    LUNG CANCER SURGERY  2005       Family History   Problem Relation Age of Onset    Cancer Sister         melanoma    Breast Cancer Sister     Heart Disease Brother        Social History     Socioeconomic History    Marital status:      Spouse name: Not on file    Number of children: 0    Years of education: Not on file    Highest education level: Not on file   Occupational History    Not on file   Tobacco Use    Smoking status: Former Smoker     Packs/day: 1.00     Years: 10.00     Pack years: 10.00     Types: Cigarettes     Quit date: 2001     Years since quittin.2    Smokeless tobacco: Never Used   Vaping Use    Vaping Use: Never used   Substance and Sexual Activity    Alcohol use: Not Currently     Comment: no ETOH since     Drug use: Never    Sexual activity: Not on file   Other Topics Concern    Not on file   Social History Narrative    Not on file     Social Determinants of Health     Financial Resource Strain:    [place] Difficulty of Paying Living Expenses:    Food Insecurity:     Worried About Running Out of Food in the Last Year:     [address] in the Last Year:    Transportation Needs:     Lack of Transportation (Medical):  Lack of Transportation (Non-Medical):    Physical Activity:     Days of Exercise per Week:     Minutes of Exercise per Session:    Stress:     Feeling of Stress :    Social Connections:     Frequency of Communication with Friends and Family:     Frequency of Social Gatherings with Friends and Family:     Attends Confucianist Services:     Active Member of Clubs or Organizations:     Attends Club or Organization Meetings:     Marital Status:    Intimate Partner Violence:     Fear of Current or Ex-Partner:     Emotionally Abused:     Physically Abused:     Sexually Abused:            Occupation: retired  Retired:  YES: Patient is retired from [employer]. REVIEW OF SYSTEMS: <<For Level 5, 10 or more systems>> approximately >20mins spent with patient and his wife about radiation therapy to the chest with handouts and slides. He has a history of left sided lung adenocarcinoma and and had a left lobectomy at the [facility] in 2004. He was recently fishing and was complaining of SOB and bilateral extremity swelling,  and has had multiple scans since then. On 6/16/2021 ct chest presented spiculated right suprahilar mass measuring 3.3x2.8cm with another lobulated spiculated mass in periphery of the RLL 3.1xd2.6cm ill defined somewhat ground glass opacity RLL 1.3x 1.1cm. 7/1/2021 right lung CT guided bx by Dr. Jennifer Hernandez and then a FNA 9/1/2021 that presented non small cell carcinoma. 8/24/2021 CT Chest showed little change to right upper lobe or the right hilar mass, with mild enlargement of the peripheral lower lobe lung mass. A specimen was sent to [facility] for a second opinion to decide the kind of tissue was from the right lower lobe.  Pet scan 9/20/2021 revealing metabolic activity associated with a nodular opacity in the medial right upper lobe, compatible with history of lung cancer.  This extends into the right hilar region.  The adjacent ground-glass opacity could be postobstructive changes or potentially related to mild neoplastic extension. Nodular opacity in the lateral right lower lobe is decreasing in size compared to the prior CT scan and has some associated metabolic activity. The appearance as well as the interval improvement could be due to an infectious etiology including atypical infections.  While this could also be  partly related to malignancy, the interval improved appearance would be unusual for malignancy without treatment. and Bone scan /MRI brain done 9/23/2021. Patient is seeing Dr. Dang Delgado for medical oncology and has another appointment 10/7/2021. All questions were answered from a nursing perspective and they expressed understanding of care. Pacemaker/Defibulator/ICD:  No    Mediport: No        FALLS RISK SCREENING ASSESSMENT    Instructions:  Assess the patient and enter the appropriate indicators that are present for fall risk identification. Total the numbers entered and assign a fall risk score from Table 2.  Reassess patient at a minimum every 12 weeks or with status change. Assessment   Date  10/4/2021     1. Mental Ability: confusion/cognitively impaired Yes-2       2. Elimination Issues: incontinence, frequency No - 0       3. Ambulatory: use of assistive devices (walker, cane, off-loading devices), attached to equipment (IV pole, oxygen) No - 0     4. Sensory Limitations: dizziness, vertigo, impaired vision No - 0       5. Age 72 years or greater - 1       10. Medication: diuretics, strong analgesics, hypnotics, sedatives, antihypertensive agents   Yes=2   7. Falls:  recent history of falls within the last 3 months (not to include slipping or tripping)   No - 0   TOTAL 5    If score of 4 or greater was education given?  Yes       TABLE 2   Risk Score Risk Level Plan of Care   0-3 Little or  No Risk 1. Provide assistance as indicated for ambulation activities  2. Reorient confused/cognitively impaired patient  3. Call-light/bell within patient's reach  4. Chair/bed in low position, stretcher/bed with siderails up except when performing patient care activities  5. Educate patient/family/caregiver on falls prevention  6.  Reassess in 12 weeks or with any noted change in patient condition which places them at a risk for a fall   4-6 Moderate Risk 1. Provide assistance as indicated for ambulation activities  2. Reorient confused/cognitively impaired patient  3. Call-light/bell within patient's reach  4. Chair/bed in low position, stretcher/bed with siderails up except when performing patient care activities  5. Educate patient/family/caregiver on falls prevention  6. Falls risk precaution (Yellow sticker Level II) placed on patient chart   7 or   Higher High Risk 1. Place patient in easily observable treatment room  2. Patient attended at all times by family member or staff  3. Provide assistance as indicated for ambulation activities  4. Reorient confused/cognitively impaired patient  5. Call-light/bell within patient's reach  6. Chair/bed in low position, stretcher/bed with siderails up except when performing patient care activities  7. Educate patient/family/caregiver on falls prevention  8. Falls risk precaution (Yellow sticker Level III) placed on patient chart           MALNUTRITION RISK SCREENING ASSESSMENT    Instructions:  Assess the patient and enter the appropriate indicators that are present for nutrition risk identification. Total the numbers entered and assign a risk score. Follow the appropriate action for total score listed below. Assessment   Date  10/4/2021     1. Have you lost weight without trying? 4- Yes, >15 kg     2. Have you been eating poorly because of a decreased appetite? 0-no   3.  Do you have a diagnosis of head and neck cancer? 0- No                                                                                    TOTAL 4          Score of 0-1: No action  Score 2 or greater:  · For Non-Diabetic Patient: Recommend adding Ensure Complete 2 x daily and provide patient with Ensure wellness bag with coupons  · For Diabetic Patient: Recommend adding Glucerna Shake 2 x daily and provide patient with Glucerna Wellness bag with coupons  · Route to the dietitian via [address]    · Are you having difficulty performing daily routine tasks due to fatigue or weakness (ie: bathing/showering, dressing, housework, meal prep, work, , etc): No     · Do you have any arm flexibility/ROM restrictions, swelling or pain that limit activity: No     · Any changes in memory, attention/focus that impact daily activities: No     · Do you avoid participation in leisure/social activity due to weakness, fatigue or pain: No     ARE ANY OF THE ABOVE ARE ANSWERED YES: No          PT ASSESSMENT FOR REFERRAL    · Have you had any recent falls in the past 2 months: No     · Do you have difficulty going up/down stairs: No     · Are you having difficulty walking: No     · Do you often hold onto furniture/environmental supports or feel off balance when you are walking: No     · Do you need to take rest breaks when you are walking: No     · Any pain on a scale of 1-10 that limits your mobility: No 0/10    ARE ANY OF THE ABOVE ARE ANSWERED YES: No                       PREHAB AUDIOLOGY REFERRAL    - Is patient planned to receive Cisplatin? No. This patient is not planned to start Cisplatin. - Is patient planned to receive radiation therapy that may be directed toward auditory canals or nerves? No. Patient is not planned to start radiation therapy to auditory canals or nerves. - Is patient complaining of new onset hearing loss? No. Patient is not complaining of new onset hearing loss.           Patient education given on radiation therapy to the lung. The patient expresses understanding and acceptance of instructions.  Jas Major RN 10/4/2021 2:56 PM           Jas Major RN

## 2021-10-05 NOTE — PATIENT INSTRUCTIONS
PATTI Mccarthy. Casper Long MD [place] [address] Oncology  Cell: [phone]    [facility]:  [phone]   FAX: [phone]  [facility]:  [address] [place] [address] Avenue:    [phone]  [facility]:  [phone]   FAX:  [phone]  Email: [email or web address]

## 2021-10-15 PROBLEM — C34.91 NON-SMALL CELL CANCER OF RIGHT LUNG (HCC): Status: ACTIVE | Noted: 2021-01-01

## 2021-10-15 NOTE — PROGRESS NOTES
[address] MED ONCOLOGY  [address] [place] [id number]  Dept: [phone]  Attending Progress Note      Reason for Visit: Non-small cell lung cancer. Referring Physician:  Sheree Fernandez MD    PCP:  Hank Olguin MD    History of Present Illness:      Mr. Jose L Ramirez is a very pleasant 81-year-old gentleman with a past medical history significant for A. 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, status post left upper lobectomy and thoracic lymphadenopathy on 10/29/2004, who was found to have on CT scan of the chest from 6/16/2021 a spiculated right suprahilar mass measuring 3.3 x 2.8 cm, a lobulated spiculated mass in the periphery of the right lower lobe measuring 3.1 x 2.6 cm, undefined; groundglass opacity in the left upper lobe measuring 1.3 x 1.1 cm, few other scattered tiny nodular densities, bilateral pleural effusions, the patient underwent on 7/1/2021 a CT-guided lung biopsy, pathology was consistent with scar tissue with focal epithelial atypia, patient underwent on 1/1/2021 bronchoscopy/EBUS by Dr. Jose Nunes, biopsies were taken from the right hilar mass, station [id number], biopsy of the hilar mass was positive for malignant cells, non-small cell carcinoma, compatible with non small cell carcinoma could not be determined from the specimen. The patient had a PET scan done on 9/20/2021, revealing metabolic activity associated with a nodular opacity in the medial right upper lobe, compatible with history of lung cancer.  This extends into the right hilar region.  The adjacent ground-glass opacity could be postobstructive changes or potentially related to mild neoplastic extension. Nodular opacity in the lateral right lower lobe is decreasing in size compared to the prior CT scan and has some associated metabolic activity.  The appearance as well as the interval improvement could be due to an infectious etiology including atypical infections.  While this could also be  partly related to malignancy, the interval improved appearance would be unusual for malignancy without treatment. Brain MRI from 1/23/2021 was negative for metastatic disease to the brain. The patient is accompanied by his spouse. No worsening shortness of breath. Review of Systems;  CONSTITUTIONAL: No fever, chills. appetite and energy level. Pos for weight loss of 30 lbs. ENMT: Eyes: No diplopia; Nose: No epistaxis. Mouth: No sore throat. RESPIRATORY: No hemoptysis, positive for chronic shortness of breath, cough. CARDIOVASCULAR: No chest pain, palpitations. GASTROINTESTINAL: No nausea/vomiting, abdominal pain, diarrhea/constipation. GENITOURINARY: No dysuria, urinary frequency, hematuria. NEURO: No syncope, presyncope, headache.   Remainder:  ROS NEGATIVE    Past Medical History:      Diagnosis Date    A-fib [facility])     AAA (abdominal aortic aneurysm) ([facility]     measures 5.0 cm    Cancer ([address] 2005    left lung    CHF (congestive heart failure) ([address]     COPD (chronic obstructive pulmonary disease) (HCC)     Heart failure (HCC)     Hyperlipidemia     Hypertension      Patient Active Problem List   Diagnosis    COPD (chronic obstructive pulmonary disease) (HCC)    Hyperlipidemia LDL goal <100    Essential hypertension    Congestive heart failure (CHF) (HCC)    Lung mass    Atrial fibrillation (HCC)    Abdominal aortic aneurysm (AAA) without rupture (HCC)    Chronic systolic congestive heart failure ([facility]        Past Surgical History:      Procedure Laterality Date    BRONCHOSCOPY N/A 9/1/2021    BRONCHOSCOPY W/EBUS FNA performed by Kizzy Fenton MD at [address] [place] [address] N/A 9/1/2021    BRONCHOSCOPY ADD ON COMPUTER ASSISTED performed by Kizzy Fenton MD at [address] [place] [address]  9/1/2021    BRONCHOSCOPY/TRANSBRONCHIAL NEEDLE BIOPSY performed by Kizzy Fenton MD at [address] [facility] [address] BRONCHOSCOPY  2021    BRONCHOSCOPY ALVEOLAR LAVAGE performed by Isis Cruz MD at [address] TEST N/A 2021    Lexiscan stress test    COLONOSCOPY      years ago    CT NEEDLE BIOPSY LUNG PERCUTANEOUS  2021    CT NEEDLE BIOPSY LUNG PERCUTANEOUS 2021 Radha Balbuena MD [facility]    LUNG CANCER SURGERY  2005       Family History:  Family History   Problem Relation Age of Onset    Cancer Sister         melanoma    Breast Cancer Sister     Heart Disease Brother        Medications:  Reviewed and reconciled. Social History:  Social History     Socioeconomic History    Marital status:      Spouse name: Not on file    Number of children: 0    Years of education: Not on file    Highest education level: Not on file   Occupational History    Not on file   Tobacco Use    Smoking status: Former Smoker     Packs/day: 1.00     Years: 10.00     Pack years: 10.00     Types: Cigarettes     Quit date: 2001     Years since quittin.3    Smokeless tobacco: Never Used   Vaping Use    Vaping Use: Never used   Substance and Sexual Activity    Alcohol use: Not Currently     Comment: no ETOH since     Drug use: Never    Sexual activity: Not on file   Other Topics Concern    Not on file   Social History Narrative    Not on file     Social Determinants of Health     Financial Resource Strain:     Difficulty of Paying Living Expenses:    Food Insecurity:     Worried About Running Out of Food in the Last Year:     [address] in the Last Year:    Transportation Needs:     Lack of Transportation (Medical):      Lack of Transportation (Non-Medical):    Physical Activity:     Days of Exercise per Week:     Minutes of Exercise per Session:    Stress:     Feeling of Stress :    Social Connections:     Frequency of Communication with Friends and Family:     Frequency of Social Gatherings with Friends and Family:     Attends Dru[employer] Services:     Active Member of Clubs or Organizations:     Attends Club or Organization Meetings:     Marital Status:    Intimate Partner Violence:     Fear of Current or Ex-Partner:     Emotionally Abused:     Physically Abused:     Sexually Abused: Allergies:  No Known Allergies    Physical Exam:  BP (!) 142/72   Pulse 96   Temp 97 °F (36.1 °C)   Resp 14   Ht 5' 9\" (1.753 m)   Wt 145 lb (65.8 kg)   SpO2 96%   BMI 21.41 kg/m²   GENERAL: Alert, oriented x 3, not in acute distress. HEENT: PERRLA; EOMI. Oropharynx clear. NECK: Supple. No palpable cervical or supraclavicular lymphadenopathy. LUNGS: Decreased air entry bilaterally. CARDIOVASCULAR: Regular rhythm. ABDOMEN: Soft. Non-tender, non-distended. Positive bowel sounds. EXTREMITIES: Without clubbing, cyanosis, or edema. NEUROLOGIC: No focal deficits.    ECOG PS 1       Impression/Plan:       Mr. Vicky Hernández is a very pleasant 52-year-old gentleman with a past medical history significant for A. 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, status post left upper lobectomy and thoracic lymphadenopathy on 10/29/2004, who was found to have on CT scan of the chest from 6/16/2021 a spiculated right suprahilar mass measuring 3.3 x 2.8 cm, a lobulated spiculated mass in the periphery of the right lower lobe measuring 3.1 x 2.6 cm, undefined; groundglass opacity in the left upper lobe measuring 1.3 x 1.1 cm, few other scattered tiny nodular densities, bilateral pleural effusions, the patient underwent on 7/1/2021 a CT-guided lung biopsy, pathology was consistent with scar tissue with focal epithelial atypia, patient underwent on 1/1/2021 bronchoscopy/EBUS by Dr. Mark Westbrook, biopsies were taken from the right hilar mass, station [id number], biopsy of the hilar mass was positive for malignant cells, non-small cell carcinoma, compatible with non small cell carcinoma could not be determined from the specimen. The patient had a PET scan done on 9/20/2021, revealing metabolic activity associated with a nodular opacity in the medial right upper lobe, compatible with history of lung cancer.  This extends into the right hilar region.  The adjacent ground-glass opacity could be postobstructive changes or potentially related to mild neoplastic extension. Nodular opacity in the lateral right lower lobe is decreasing in size compared to the prior CT scan and has some associated metabolic activity. The appearance as well as the interval improvement could be due to an infectious etiology including atypical infections.  While this could also be  partly related to malignancy, the interval improved appearance would be unusual for malignancy without treatment. Brain MRI from 1/23/2021 was negative for metastatic disease to the brain. The patient has a newly diagnosed non-small cell lung cancer, could not be determined whether it is adenocarcinoma or squamous cell carcinoma, pathology was sent to [facility] for a second opinion, the case was discussed with Dr. Nidhi Simental, diagnosis is non-small cell carcinoma, the diagnostic material is limited, some areas show cytoplasmic vacuolization favoring an adenocarcinoma, but there was insufficient material for IHC stains to confirm. He has clinical stage T2N1M0 (nodular opacity in the right lower lobe is decreasing in size, this is likely of benign etiology, will follow for now, if malignant SBRT can be done). The PET scan images were reviewed with the patient and his spouse. I discussed with them his diagnosis, prognosis and recommendations for treatment. I offered the patient evaluation by thoracic surgery, the patient is not interested in any surgical interventions, which is very reasonable at his age and due to his comorbidities.   Concurrent chemoradiation is recommended, adenocarcinoma could not be confirmed, therefore we will treat him with weekly carboplatin and Taxol, the side effects of the treatment were reviewed with the patient, he will have a chemo teach, adjuvant immunotherapy, durvalumab after completion of the chemo RT is recommended. The case was discussed with Dr. Hoy Goltz. RTC with chemo/RT start. Thank you for allowing us to participate in the care of Mr. Delroy Powers.     Yana Zimmerman MD   HEMATOLOGY/MEDICAL ONCOLOGY  [address] [place] [address] ONCOLOGY  [facility] [address] [facility] [address]  Dept: [phone]

## 2021-11-01 NOTE — TELEPHONE ENCOUNTER
Returned call to wife veronica regarding message left that patient was unaware that his appointment for tomorrow was to begin treatment and radiation. After further discussion with her and the patient they wish to reschedule his appointment to start treatment  until 11-9-21. Jacoby Altamirano RN in radiation was contacted and message was left for patient to be rescheduled accordingly.

## 2021-11-04 NOTE — PROGRESS NOTES
Out Patient CARDIOLOGY FOLLOW UP    Name: Wilma Leggett    Age: 81 y.o. Date of Service: 11/4/2021      Referring Physician: No admitting provider for patient encounter. Chief Complaint   Patient presents with    Atrial Fibrillation     8 week ov- pt has complaints of sob and swelling in feet and legs    Hypertension        History of Present Illness: 81-year-old male with history of small cell lung cancer status post upper lobe resection in 2004 and reported undergoing chemotherapy as well, hypertension, COPD, hyperlipidemia, gout, BPH, and paroxysmal atrial fibrillation. Recently she was having symptoms for atrial fibrillation and subsequently underwent cardioversion. She presented for follow-up visit today. EKG today shows patient is still in atrial fibrillation. However she denies any symptoms whatsoever. He reports taking all his medication without missing any dose. He will follow up in 1 year.     Medical History:  1 history of small cell lung cancer. S/p upper lobe resection 2004  2 hypertension  3 COPD  4 hyperlipidemia.   Total cholesterol 117, triglycerides 65, HDL 38, LDL 62 (06/2021)  5 hospitalized with bronchitis 03/2016  6 gout  7 BPH  8 atrial fibrillation DX 2021: [id number] at least 3 Apixaban initiated  9 OP cardiac assessment 06/08/2021: Metoprolol tartrate increased to 75 mgBID     Review of Systems:   Cardiac: As per HPI  General: Denies fever or chills  Pulmonary: As per HPI  HEENT: Denies runny nose  GI: No complaints  : No complaints  Endocrine: Denies night sweats  Musculoskeletal: No complaints  Skin: Dry skin  Neuro: No complaints  Psych: Denies depression    Past Medical History:  Past Medical History:   Diagnosis Date    A-fib ([address]     AAA (abdominal aortic aneurysm) ([address]     measures 5.0 cm    Cancer ([address] 2005    left lung    CHF (congestive heart failure) ([address]     COPD (chronic obstructive pulmonary disease) ([address]     Heart failure ([address]     Hyperlipidemia     Hypertension        Past Surgical History:  Past Surgical History:   Procedure Laterality Date    BRONCHOSCOPY N/A 2021    BRONCHOSCOPY W/EBUS FNA performed by Sheree Fernandez MD at [address] [place] [address] N/A 2021    BRONCHOSCOPY ADD ON COMPUTER ASSISTED performed by Sheree Fernandez MD at [address] [place] [address]  2021    BRONCHOSCOPY/TRANSBRONCHIAL NEEDLE BIOPSY performed by Sheree Fernandez MD at [address] [place] [address]  2021    BRONCHOSCOPY ALVEOLAR LAVAGE performed by Sheree Fernandez MD at [address] TEST N/A 2021    Lexiscan stress test    COLONOSCOPY      years ago    CT NEEDLE BIOPSY LUNG PERCUTANEOUS  2021    CT NEEDLE BIOPSY LUNG PERCUTANEOUS 2021 Jacquie Nagy MD [facility] CT    LUNG CANCER SURGERY  2005       Family History:  Family History   Problem Relation Age of Onset    Cancer Sister         melanoma    Breast Cancer Sister     Heart Disease Brother        Social History:  Social History     Socioeconomic History    Marital status:      Spouse name: Not on file    Number of children: 0    Years of education: Not on file    Highest education level: Not on file   Occupational History    Not on file   Tobacco Use    Smoking status: Former Smoker     Packs/day: 1.00     Years: 10.00     Pack years: 10.00     Types: Cigarettes     Quit date: 2001     Years since quittin.3    Smokeless tobacco: Never Used   Vaping Use    Vaping Use: Never used   Substance and Sexual Activity    Alcohol use: Not Currently     Comment: no ETOH since     Drug use: Never    Sexual activity: Not on file   Other Topics Concern    Not on file   Social History Narrative    Not on file     Social Determinants of Health     Financial Resource Strain:     Difficulty of Paying Living Expenses:    Food Insecurity:     Worried About Running Out of Food in the Last Year:     Sarah brice Food in the Last Year:    Transportation Needs:     Lack of Transportation (Medical):  Lack of Transportation (Non-Medical):    Physical Activity:     Days of Exercise per Week:     Minutes of Exercise per Session:    Stress:     Feeling of Stress :    Social Connections:     Frequency of Communication with Friends and Family:     Frequency of Social Gatherings with Friends and Family:     Attends Latter day Services:     Active Member of Clubs or Organizations:     Attends Club or Organization Meetings:     Marital Status:    Intimate Partner Violence:     Fear of Current or Ex-Partner:     Emotionally Abused:     Physically Abused:     Sexually Abused: Allergies:  No Known Allergies    Home Medications:  Prior to Admission medications    Medication Sig Start Date End Date Taking?  Authorizing Provider   isosorbide mononitrate (IMDUR) 30 MG extended release tablet Take 2 tablets by mouth daily 10/22/21  Yes Hayder Bell MD   PROCTO-MED HC 2.5 % CREA rectal cream APPLY TO AFFECTED AREA TWICE A DAY MAXIMUM OF 2 WEEKS 7/6/21  Yes Historical Provider, MD   apixaban (ELIQUIS) 2.5 MG TABS tablet Take 1 tablet by mouth 2 times daily 6/28/21  Yes Teodoro Pretty MD   dilTIAZem (CARDIZEM CD) 120 MG extended release capsule Take 1 capsule by mouth daily 6/28/21  Yes Teodoro Pretty MD   bumetanide (BUMEX) 1 MG tablet Take 1 tablet by mouth daily 6/25/21  Yes Salomón Sheriff MD   metoprolol succinate (TOPROL XL) 100 MG extended release tablet Take 1 tablet by mouth 2 times daily 6/18/21  Yes Yoselin Babb MD   albuterol-ipratropium (COMBIVENT RESPIMAT)  MCG/ACT AERS inhaler Inhale 2 puffs into the lungs every 6 hours as needed for Wheezing Uses bid, may use up to four times daily for sob   Yes Historical Provider, MD   atorvastatin (LIPITOR) 10 MG tablet Take 10 mg by mouth every other day    Yes Historical Provider, MD   Cholecalciferol (VITAMIN D3) 2000 UNITS CAPS Take 2,000 Units by mouth daily    Yes Historical Provider, MD       Current Medications:  Current Outpatient Medications   Medication Sig Dispense Refill    isosorbide mononitrate (IMDUR) 30 MG extended release tablet Take 2 tablets by mouth daily 60 tablet 5    PROCTO-MED HC 2.5 % CREA rectal cream APPLY TO AFFECTED AREA TWICE A DAY MAXIMUM OF 2 WEEKS      apixaban (ELIQUIS) 2.5 MG TABS tablet Take 1 tablet by mouth 2 times daily 60 tablet 5    dilTIAZem (CARDIZEM CD) 120 MG extended release capsule Take 1 capsule by mouth daily 30 capsule 3    bumetanide (BUMEX) 1 MG tablet Take 1 tablet by mouth daily 30 tablet 0    metoprolol succinate (TOPROL XL) 100 MG extended release tablet Take 1 tablet by mouth 2 times daily 60 tablet 3    albuterol-ipratropium (COMBIVENT RESPIMAT)  MCG/ACT AERS inhaler Inhale 2 puffs into the lungs every 6 hours as needed for Wheezing Uses bid, may use up to four times daily for sob      atorvastatin (LIPITOR) 10 MG tablet Take 10 mg by mouth every other day       Cholecalciferol (VITAMIN D3) 2000 UNITS CAPS Take 2,000 Units by mouth daily        No current facility-administered medications for this visit. Physical Exam:  /70   Pulse 70   Resp 18   Ht 5' 9\" (1.753 m)   Wt 150 lb 9.6 oz (68.3 kg)   BMI 22.24 kg/m²   Wt Readings from Last 3 Encounters:   11/04/21 150 lb 9.6 oz (68.3 kg)   10/15/21 145 lb (65.8 kg)   10/04/21 143 lb 11.2 oz (65.2 kg)       Appearance: Alert and oriented x3 not in acute distress.   Skin: Dry skin  Head: Atraumatic  Eyes: Intact extraocular muscles   ENMT: Mucous membranes are moist  Neck: Supple  Lungs: Clear to auscultation  Cardiac: Normal S1 and S2  Abdomen: Protuberant  Extremities: Intact range of motion  Neurologic: No focal neurological deficits  Peripheral Pulses: 2+ peripheral pulses    Intake/Output:  No intake or output data in the 24 hours ending 11/04/21 1213  [unfilled]    Laboratory Tests:  No results for input(s): NA, K, CL, CO2, BUN, CREATININE, GLUCOSE, CALCIUM in the last 72 hours. Lab Results   Component Value Date    MG 2.1 06/25/2021    MG 2.3 03/19/2016     No results for input(s): ALKPHOS, ALT, AST, PROT, BILITOT, BILIDIR, LABALBU in the last 72 hours. No results for input(s): WBC, RBC, HGB, HCT, MCV, MCH, MCHC, RDW, PLT, MPV in the last 72 hours. Lab Results   Component Value Date    CKTOTAL 70 03/18/2016    CKMB 2.7 03/18/2016    TROPONINI <0.01 03/18/2016     No results for input(s): CKTOTAL, CKMB, CKMBINDEX, TROPHS in the last 72 hours. Lab Results   Component Value Date    INR 1.1 07/01/2021    PROTIME 12.3 07/01/2021     Lab Results   Component Value Date    TSH 3.130 06/16/2021     No results found for: LABA1C  No results found for: EAG  Lab Results   Component Value Date    CHOL 95 06/25/2021     Lab Results   Component Value Date    TRIG 68 06/25/2021     Lab Results   Component Value Date    HDL 38 06/25/2021     Lab Results   Component Value Date    LDLCALC 43 06/25/2021     Lab Results   Component Value Date    LABVLDL 14 06/25/2021     No results found for: CHOLHDLRATIO  No results for input(s): PROBNP in the last 72 hours. Cardiac Tests:  EKG reviewed (EKG date: Atrial fibrillation, 70 bpm nonspecific ST-T wave changes.):    Echocardiogram reviewed: 7/27/21   Summary   Ejection fraction is visually estimated at 50%. No evidence of thrombus within left atrium or appendage. Mild-moderate mitral regurgitation. Mild aortic valve regurgitation. Moderate tricuspid regurgitation. Signature    Stress test reviewed:  6/2021      1. The myocardial perfusion is normal.   2. No evidence of stress-induced ischemia or prior myocardial   infarction. 3. Mild LV systolic dysfunction, EF 02% with above-noted wall motion   abnormalities. 4. There is no transient ischemic dilation. 5. Intermediate risk myocardial perfusion study.          Cardiac catheterization reviewed:       CT abdomen July 15, 2021:  Fusiform and partly saccular 5.0 cm distal abdominal aortic aneurysm is   unchanged from last month ultrasound.  Recommend vascular specialists   consultation.  Reference: J Vasc Surg 2018;67:2-77. CXR reviewed: The ASCVD Risk score (Russell Lafleur., et al., 2013) failed to calculate for the following reasons: The 2013 ASCVD risk score is only valid for ages 36 to 78    ASSESSMENT / PLAN:    1. Atrial fibrillation, unspecified type (HCC)  Continue Eliquis and diltiazem. He is also on beta-blocker for rate control  Rate is currently adequately controlled and blood pressure is stable  He will follow-up in 1 year but is advised to call for earlier visit if symptoms recur    2. Hyperlipidemia LDL goal <100  Continue statin therapy  3. Essential hypertension  Blood pressure is stable  4. Diastolic congestive heart failure, unspecified HF chronicity (HCC)  Currently in NYHA class I  Continue Bumex and follow low-salt diet    5. Abdominal aortic aneurysm (AAA) without rupture [facility])  CT abdomen on July 2021 revealed abdominal aortic aneurysm of 5 cm no change from previously. Follow-up with vascular surgery and PCP  Good blood pressure and heart rate control was discussed  He currently does not smoke. Continue statin therapy, beta-blocker and diltiazem  6. Mucopurulent chronic bronchitis ([address]  Follow-up with your PCP and pulmonologist  7. Lung mass  Follow-up with your oncologist  8. Non-small cell cancer of right lung [facility])  Follow-up with your oncologist        FOLLOW-UP 1 year        Thank you for allowing me to participate in your patient's care. Please feel free to contact me if you have any questions or concerns.     Sarai Rowley MD  [facility]) Cardiology

## 2021-11-09 NOTE — PROGRESS NOTES
Elyssa Mckeon  11/9/2021  Wt Readings from Last 10 Encounters:   11/09/21 159 lb (72.1 kg)   11/04/21 150 lb 9.6 oz (68.3 kg)   10/15/21 145 lb (65.8 kg)   10/04/21 143 lb 11.2 oz (65.2 kg)   09/28/21 145 lb (65.8 kg)   09/01/21 142 lb (64.4 kg)   08/23/21 142 lb (64.4 kg)   07/27/21 142 lb (64.4 kg)   07/23/21 142 lb (64.4 kg)   07/01/21 143 lb (64.9 kg)     Ht Readings from Last 1 Encounters:   11/09/21 5' 9\" (1.753 m)     Body mass index is 23.48 kg/m². Met with patient and his wife today for initial introductions and to review current nutrition status. He reports a weight loss of 20# over the past 6-9mo, but recent weight gain of 15#. He feels he is eating better. He continues to really only eat 2 meals/day. He is now supplementing with one Boost per day. He denies n/v/d/c. Initiated RT yesterday as well. Spent time with patient and his wife reviewing foods to avoid, foods to choose, eating plan and regimen and how to increase to two supplements/day. Handouts, samples and coupons were provided to the patient. He will be followed for any need for increase in ONS, or other ways to help nutritionally as deemed appropriate. Encouraged to call with any questions or concerns. Weight change: hx 20# weight loss, now 15# weight gain in 1mo (10%) *pt with elevated BUN/CRE, will be monitored for fluid flux. No edema noted to BLE  Appetite: fair  N/V/D/C: denies  Calculated Needs if applicable: [id number] (72x30-32), 90-100gm PRO (72x1.3), 2300ml (72x32)    Pre-Hab Eligible?: no        Recommendations: Pt is to consume 2 meals, 2 snacks and 2 ONS daily; Follow for change in intervention. Boost Original BID provides 500kcal, 30gm PRO.       ASPEN GUIDELINES FOR CLINICAL CHARACTERISTICS OF MALNUTRITION IN CHRONIC ILLNESS     Moderate Malnutrition  Severe Malnutrition    Energy intake  <75% energy intake compared to estimated needs for >1month <75% energy intake compared to estimated needs for >1month   Weight changes  5% x 1 month  7.5% x 3 months   10% x 6 months   20% x 1 year  >5% x 1 month  >7.5% x 3 months   >10% x 6 months   >20% x 1 year    Physical findings  Mild   Decrease subcutaneous fat    Decrease muscle mass     Increase fluid/edema   Severe  Decrease subcutaneous fat    Decrease muscle mass     Increase fluid/edema      Pt is at risk for malnutrition due to hx 15% weight loss, reported decreased appetite and plan for concurrent chemoradiation.       Pushpa Zapata RD, LD

## 2021-11-09 NOTE — PROGRESS NOTES
Reviewed Chemotherapy Discharge Tip Sheet with patient and his wife. Questions answered and emotional support given.

## 2021-11-09 NOTE — PROGRESS NOTES
Spoke with patient's wife at bedside regarding 2 new medications be sent for him to have at home for nausea. Verbal order with read back from Dr. Lucia Ko to send in Zofran ODT 4 mg every 8 hours as needed for nausea/vomitting (qty 60 + 1 refill) and compazine 10 mg every 6 hours as needed for nausea  (qty 60 + 1 refill). Provided them with printed out material again regarding chemotherapy and I told them to call if there are any questions. They expresses understanding and acceptance of instructions.      Electronically signed by MCKENZIE Lunsford [facility] on 11/9/2021 at 11:25 AM

## 2021-11-09 NOTE — PROGRESS NOTES
[address] MED ONCOLOGY  [address] [place] [id number]  Dept: [phone]  Attending Progress Note      Reason for Visit: Non-small cell lung cancer. Referring Physician:  Kevin Noel MD    PCP:  Mar Graham MD    History of Present Illness:      Mr. Prieto Richard is a very pleasant 21-year-old gentleman with a past medical history significant for A. 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, status post left upper lobectomy and thoracic lymphadenopathy on 10/29/2004, who was found to have on CT scan of the chest from 6/16/2021 a spiculated right suprahilar mass measuring 3.3 x 2.8 cm, a lobulated spiculated mass in the periphery of the right lower lobe measuring 3.1 x 2.6 cm, undefined; groundglass opacity in the left upper lobe measuring 1.3 x 1.1 cm, few other scattered tiny nodular densities, bilateral pleural effusions, the patient underwent on 7/1/2021 a CT-guided lung biopsy, pathology was consistent with scar tissue with focal epithelial atypia, patient underwent on 1/1/2021 bronchoscopy/EBUS by Dr. Johnny Quinonez, biopsies were taken from the right hilar mass, station [id number], biopsy of the hilar mass was positive for malignant cells, non-small cell carcinoma, compatible with non small cell carcinoma could not be determined from the specimen. The patient had a PET scan done on 9/20/2021, revealing metabolic activity associated with a nodular opacity in the medial right upper lobe, compatible with history of lung cancer.  This extends into the right hilar region.  The adjacent ground-glass opacity could be postobstructive changes or potentially related to mild neoplastic extension. Nodular opacity in the lateral right lower lobe is decreasing in size compared to the prior CT scan and has some associated metabolic activity.  The appearance as well as the interval improvement could be due to an infectious etiology including atypical infections.  While this could also be  partly related to malignancy, the interval improved appearance would be unusual for malignancy without treatment. Brain MRI from 1/23/2021 was negative for metastatic disease to the brain. The patient is accompanied by his spouse. No worsening shortness of breath. He was started on radiation therapy on 11/8/2021. Review of Systems;  CONSTITUTIONAL: No fever, chills. appetite and energy level. Pos for weight loss of 30 lbs. ENMT: Eyes: No diplopia; Nose: No epistaxis. Mouth: No sore throat. RESPIRATORY: No hemoptysis, positive for chronic shortness of breath, cough. CARDIOVASCULAR: No chest pain, palpitations. GASTROINTESTINAL: No nausea/vomiting, abdominal pain, diarrhea/constipation. GENITOURINARY: No dysuria, urinary frequency, hematuria. NEURO: No syncope, presyncope, headache.   Remainder:  ROS NEGATIVE    Past Medical History:      Diagnosis Date    [facility])     AAA (abdominal [facility]) [facility]     measures 5.0 cm    Cancer ([facility] 2005    left lung    CHF (congestive heart failure) ([facility]     COPD (chronic obstructive pulmonary disease) (HCC)     Heart failure (HCC)     Hyperlipidemia     Hypertension      Patient Active Problem List   Diagnosis    COPD (chronic obstructive pulmonary disease) (HCC)    Hyperlipidemia LDL goal <100    Essential hypertension    Congestive heart failure (CHF) (HCC)    Lung mass    Atrial fibrillation (HCC)    Abdominal aortic aneurysm (AAA) without rupture (HCC)    Chronic systolic congestive heart failure (HCC)    Non-small cell cancer of right lung ([facility]        Past Surgical History:      Procedure Laterality Date    BRONCHOSCOPY N/A 9/1/2021    BRONCHOSCOPY W/EBUS FNA performed by Zamzam Murray MD at [address] [place] [address] N/A 9/1/2021    BRONCHOSCOPY ADD ON COMPUTER ASSISTED performed by Zamzam Murray MD at [address] [place] [address]  9/1/2021 : Not on file   Social Connections:     Frequency of Communication with Friends and Family: Not on file    Frequency of Social Gatherings with Friends and Family: Not on file    Attends Cheondoism Services: Not on file    Active Member of Clubs or Organizations: Not on file    Attends Club or Organization Meetings: Not on file    Marital Status: Not on file   Intimate Partner Violence:     Fear of Current or Ex-Partner: Not on file    Emotionally Abused: Not on file    Physically Abused: Not on file    Sexually Abused: Not on file   Housing Stability:     Unable to Pay for Housing in the Last Year: Not on file    Number of [place] in the Last Year: Not on file    Unstable Housing in the Last Year: Not on file       Allergies:  No Known Allergies    Physical Exam:  BP (!) 165/97   Pulse 93   Temp 97.3 °F (36.3 °C)   Resp 16   Ht 5' 9\" (1.753 m)   Wt 159 lb (72.1 kg)   SpO2 93%   BMI 23.48 kg/m²   GENERAL: Alert, oriented x 3, not in acute distress. HEENT: PERRLA; EOMI. Oropharynx clear. NECK: Supple. No palpable cervical or supraclavicular lymphadenopathy. LUNGS: Decreased air entry bilaterally. CARDIOVASCULAR: Regular rhythm. ABDOMEN: Soft. Non-tender, non-distended. Positive bowel sounds. EXTREMITIES: Without clubbing, cyanosis, or edema. NEUROLOGIC: No focal deficits.    ECOG PS 1       Impression/Plan:       Mr. Michelle Villalpando is a very pleasant 60-year-old gentleman with a past medical history significant for A. 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, status post left upper lobectomy and thoracic lymphadenopathy on 10/29/2004, who was found to have on CT scan of the chest from 6/16/2021 a spiculated right suprahilar mass measuring 3.3 x 2.8 cm, a lobulated spiculated mass in the periphery of the right lower lobe measuring 3.1 x 2.6 cm, undefined; groundglass opacity in the left upper lobe measuring 1.3 x 1.1 cm, few other scattered tiny nodular densities, bilateral pleural effusions, the patient underwent on 7/1/2021 a CT-guided lung biopsy, pathology was consistent with scar tissue with focal epithelial atypia, patient underwent on 1/1/2021 bronchoscopy/EBUS by Dr. Lanny Smallwood, biopsies were taken from the right hilar mass, station [id number], biopsy of the hilar mass was positive for malignant cells, non-small cell carcinoma, compatible with non small cell carcinoma could not be determined from the specimen. The patient had a PET scan done on 9/20/2021, revealing metabolic activity associated with a nodular opacity in the medial right upper lobe, compatible with history of lung cancer.  This extends into the right hilar region.  The adjacent ground-glass opacity could be postobstructive changes or potentially related to mild neoplastic extension. Nodular opacity in the lateral right lower lobe is decreasing in size compared to the prior CT scan and has some associated metabolic activity. The appearance as well as the interval improvement could be due to an infectious etiology including atypical infections.  While this could also be  partly related to malignancy, the interval improved appearance would be unusual for malignancy without treatment. Brain MRI from 1/23/2021 was negative for metastatic disease to the brain. The patient has a newly diagnosed non-small cell lung cancer, could not be determined whether it is adenocarcinoma or squamous cell carcinoma, pathology was sent to [facility] for a second opinion, the case was discussed with Dr. Caridad Wright, diagnosis is non-small cell carcinoma, the diagnostic material is limited, some areas show cytoplasmic vacuolization favoring an adenocarcinoma, but there was insufficient material for IHC stains to confirm.  He has clinical stage T2N1M0 (nodular opacity in the right lower lobe is decreasing in size, this is likely of benign etiology, will follow for now, if malignant SBRT can be done). The PET scan images were reviewed with the patient and his spouse. I discussed with them his diagnosis, prognosis and recommendations for treatment. I offered the patient evaluation by thoracic surgery, the patient is not interested in any surgical interventions, which is very reasonable at his age and due to his comorbidities. Concurrent chemoradiation is recommended, adenocarcinoma could not be confirmed, therefore we will treat him with weekly carboplatin and Taxol, the side effects of the treatment were reviewed with the patient, he will have a chemo teach, adjuvant immunotherapy, durvalumab after completion of the chemo RT is recommended. The patient was started on radiation therapy yesterday 11/8/2021, today 11/9/2021, he will receive the first cycle of carboplatin and Taxol, labs reviewed, proceed with treatment. The side effects were reviewed with the patient. RTC in 1 week    Thank you for allowing us to participate in the care of Mr. Shanda Gutierrez.     Jeff Maldonado MD   HEMATOLOGY/MEDICAL ONCOLOGY  [address] [place] [address] MED ONCOLOGY  [address]  [place] [id number]  Dept: [phone]

## 2021-11-11 NOTE — PROGRESS NOTES
Arleta Other  11/11/2021  Wt Readings from Last 3 Encounters:   11/11/21 156 lb 3.2 oz (70.9 kg)   11/09/21 159 lb (72.1 kg)   11/04/21 150 lb 9.6 oz (68.3 kg)     Body mass index is 23.07 kg/m². Treatment Area:CTV R lung    Patient was seen today for weekly visit. Comfort Alteration  KPS:80%  Fatigue: Mild    Ventilation Alterations  Cough: No  Hemoptysis: No  Mucus Color: no  Dyspnea: No  O2 Sat: 97%    Nutritional Alteration  Anorexia: No  Nausea: No   Vomiting: No     Skin Alteration   Sensation:no    Radiation Dermatitis:  no    Mucous Membrane Alteration  Voice Changes/ Stridor/Larynx: no  Pharynx & Esophagus: no    Elimination Alterations  Constipation: no  Diarrhea:  no      Emotional  Coping: effective      Injury, potential bleeding or infection: no    Other:no    Lab Results   Component Value Date    WBC 7.9 11/08/2021     11/08/2021         /82   Pulse 88   Temp 97.8 °F (36.6 °C) (Temporal)   Resp 18   Wt 156 lb 3.2 oz (70.9 kg)   SpO2 97%   BMI 23.07 kg/m²   BP within normal range? yes      Assessment/Plan: Pt completed 4/30fx and 800/6000cGy. Pt is tolerating tx well thus far.      Aditya Bradley RN

## 2021-11-16 NOTE — TELEPHONE ENCOUNTER
Called patient and left message regarding ultrasound report. Informed patient that it was positive for DVT. Instructions given for patient to increase his dose of Eliquis to 5mg BID from 2.5mg BID. Informed him that a new prescription was sent to the pharmacy on record. Encouraged patient to call with any questions or concerns.

## 2021-11-16 NOTE — PROGRESS NOTES
Venous US pos for  deep venous thrombosis in the left peroneal veins, probably secondary to malignancy, not Eliquis failure, he has CKD, will increase the Eliquis dose to 5 mg po bid, will notify cardiology. TAMEKA

## 2021-11-16 NOTE — PROGRESS NOTES
[address] MED ONCOLOGY  [facility] [address]  Dept: [phone]  Attending Progress Note      Reason for Visit: Non-small cell lung cancer. Referring Physician:  Maira Olivas MD    PCP:  Rodolfo Hernandez MD    History of Present Illness:      Mr. Austen Roberts is a very pleasant 81-year-old gentleman with a past medical history significant for A. 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, status post left upper lobectomy and thoracic lymphadenopathy on 10/29/2004, who was found to have on CT scan of the chest from 6/16/2021 a spiculated right suprahilar mass measuring 3.3 x 2.8 cm, a lobulated spiculated mass in the periphery of the right lower lobe measuring 3.1 x 2.6 cm, undefined; groundglass opacity in the left upper lobe measuring 1.3 x 1.1 cm, few other scattered tiny nodular densities, bilateral pleural effusions, the patient underwent on 7/1/2021 a CT-guided lung biopsy, pathology was consistent with scar tissue with focal epithelial atypia, patient underwent on 1/1/2021 bronchoscopy/EBUS by Dr. Kylah Ruiz, biopsies were taken from the right hilar mass, station [id number], biopsy of the hilar mass was positive for malignant cells, non-small cell carcinoma, compatible with non small cell carcinoma could not be determined from the specimen. The patient had a PET scan done on 9/20/2021, revealing metabolic activity associated with a nodular opacity in the medial right upper lobe, compatible with history of lung cancer.  This extends into the right hilar region.  The adjacent ground-glass opacity could be postobstructive changes or potentially related to mild neoplastic extension. Nodular opacity in the lateral right lower lobe is decreasing in size compared to the prior CT scan and has some associated metabolic activity.  The appearance as well as the interval improvement could be due to an infectious etiology including atypical infections.  While this could also be  partly related to malignancy, the interval improved appearance would be unusual for malignancy without treatment. Brain MRI from 1/23/2021 was negative for metastatic disease to the brain. The patient is accompanied by his spouse. No worsening shortness of breath. He was started on radiation therapy on 11/8/2021, chemotherapy was started on 11/9/2021, no nausea or vomiting, he has lower leg edema. Review of Systems;  CONSTITUTIONAL: No fever, chills. appetite and energy level. Pos for weight loss of 30 lbs. ENMT: Eyes: No diplopia; Nose: No epistaxis. Mouth: No sore throat. RESPIRATORY: No hemoptysis, positive for chronic shortness of breath, cough. CARDIOVASCULAR: No chest pain, palpitations. GASTROINTESTINAL: No nausea/vomiting, abdominal pain, positive for constipation. GENITOURINARY: No dysuria, urinary frequency, hematuria. NEURO: No syncope, presyncope, headache.   Remainder:  ROS NEGATIVE    Past Medical History:      Diagnosis Date    A-fib [facility])     AAA (abdominal aortic aneurysm) ([address]     measures 5.0 cm    Cancer ([address] 2005    left lung    CHF (congestive heart failure) ([address]     COPD (chronic obstructive pulmonary disease) (HCC)     Heart failure (HCC)     Hyperlipidemia     Hypertension      Patient Active Problem List   Diagnosis    COPD (chronic obstructive pulmonary disease) (HCC)    Hyperlipidemia LDL goal <100    Essential hypertension    Congestive heart failure (CHF) (HCC)    Lung mass    Atrial fibrillation (HCC)    Abdominal aortic aneurysm (AAA) without rupture (HCC)    Chronic systolic congestive heart failure (HCC)    Non-small cell cancer of right lung ([address]        Past Surgical History:      Procedure Laterality Date    BRONCHOSCOPY N/A 9/1/2021    BRONCHOSCOPY W/EBUS FNA performed by Kimberly Britt MD at [address] [facility] N/A 9/1/2021    BRONCHOSCOPY ADD ON COMPUTER on file    Minutes of Exercise per Session: Not on file   Stress:     Feeling of Stress : Not on file   Social Connections:     Frequency of Communication with Friends and Family: Not on file    Frequency of Social Gatherings with Friends and Family: Not on file    Attends [employer] Services: Not on file    Active Member of [address] [place] [address] or Organizations: Not on file    Attends Club or Organization Meetings: Not on file    Marital Status: Not on file   Intimate Partner Violence:     Fear of Current or Ex-Partner: Not on file    Emotionally Abused: Not on file    Physically Abused: Not on file    Sexually Abused: Not on file   Housing Stability:     Unable to Pay for Housing in the Last Year: Not on file    Number of [place] in the Last Year: Not on file    Unstable Housing in the Last Year: Not on file       Allergies:  No Known Allergies    Physical Exam:  BP (!) 155/89   Pulse 95   Temp 97.2 °F (36.2 °C)   Ht 5' 9\" (1.753 m)   Wt 157 lb 6.4 oz (71.4 kg)   SpO2 95%   BMI 23.24 kg/m²   GENERAL: Alert, oriented x 3, not in acute distress. HEENT: PERRLA; EOMI. Oropharynx clear. NECK: Supple. No palpable cervical or supraclavicular lymphadenopathy. LUNGS: Decreased air entry bilaterally. CARDIOVASCULAR: Regular rhythm. ABDOMEN: Soft. Non-tender, non-distended. Positive bowel sounds. EXTREMITIES: Mild bilateral lower leg edema left greater than right. NEUROLOGIC: No focal deficits.    ECOG PS 1       Impression/Plan:       Mr. Aurea Wagoner is a very pleasant 64-year-old gentleman with a past medical history significant for A. 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, status post left upper lobectomy and thoracic lymphadenopathy on 10/29/2004, who was found to have on CT scan of the chest from 6/16/2021 a spiculated right suprahilar mass measuring 3.3 x 2.8 cm, a lobulated spiculated mass in the periphery of the right lower lobe measuring 3.1 x 2.6 cm, undefined; groundglass opacity in the left upper lobe measuring 1.3 x 1.1 cm, few other scattered tiny nodular densities, bilateral pleural effusions, the patient underwent on 7/1/2021 a CT-guided lung biopsy, pathology was consistent with scar tissue with focal epithelial atypia, patient underwent on 1/1/2021 bronchoscopy/EBUS by Dr. Victorina Mead, biopsies were taken from the right hilar mass, station [id number], biopsy of the hilar mass was positive for malignant cells, non-small cell carcinoma, compatible with non small cell carcinoma could not be determined from the specimen. The patient had a PET scan done on 9/20/2021, revealing metabolic activity associated with a nodular opacity in the medial right upper lobe, compatible with history of lung cancer.  This extends into the right hilar region.  The adjacent ground-glass opacity could be postobstructive changes or potentially related to mild neoplastic extension. Nodular opacity in the lateral right lower lobe is decreasing in size compared to the prior CT scan and has some associated metabolic activity. The appearance as well as the interval improvement could be due to an infectious etiology including atypical infections.  While this could also be  partly related to malignancy, the interval improved appearance would be unusual for malignancy without treatment. Brain MRI from 1/23/2021 was negative for metastatic disease to the brain. The patient has a newly diagnosed non-small cell lung cancer, could not be determined whether it is adenocarcinoma or squamous cell carcinoma, pathology was sent to [facility] for a second opinion, the case was discussed with Dr. Kenyetta Pedroza, diagnosis is non-small cell carcinoma, the diagnostic material is limited, some areas show cytoplasmic vacuolization favoring an adenocarcinoma, but there was insufficient material for IHC stains to confirm.  He has clinical stage T2N1M0 (nodular opacity in the right lower lobe is decreasing in size, this is likely of benign etiology, will follow for now, if malignant SBRT can be done). The PET scan images were reviewed with the patient and his spouse. I discussed with them his diagnosis, prognosis and recommendations for treatment. I offered the patient evaluation by thoracic surgery, the patient is not interested in any surgical interventions, which is very reasonable at his age and due to his comorbidities. Concurrent chemoradiation is recommended, adenocarcinoma could not be confirmed, therefore we will treat him with weekly carboplatin and Taxol, the side effects of the treatment were reviewed with the patient, he will have a chemo teach, adjuvant immunotherapy, durvalumab after completion of the chemo RT is recommended. The patient was started on radiation therapy on11/8/2021, was started on weekly carboplatin and Taxol on 11/9/2021, tolerating the chemotherapy well overall, today 11/16/2021 labs reviewed, the patient is doing well clinically, proceed with chemotherapy, carboplatin/Taxol cycle #2. He has lower leg edema, bilateral venous ultrasounds were ordered, he will let cardiology know. RTC in 1 week    Thank you for allowing us to participate in the care of Mr. Emma Aleman.     Ana Maria Henry MD   HEMATOLOGY/MEDICAL ONCOLOGY  [address] [place] [address] MED ONCOLOGY  [facility] [address] [facility] [address]  Dept: [phone]

## 2021-11-17 NOTE — PROGRESS NOTES
Venancio Faulkner  11/17/2021  Wt Readings from Last 3 Encounters:   11/17/21 159 lb 8 oz (72.3 kg)   11/16/21 157 lb 6.4 oz (71.4 kg)   11/11/21 156 lb 3.2 oz (70.9 kg)     Body mass index is 23.55 kg/m². Treatment Area:CTV R lung    Patient was seen today for weekly visit. Comfort Alteration  KPS:80%  Fatigue: Moderate    Ventilation Alterations  Cough: No  Hemoptysis: No  Mucus Color: na  Dyspnea: No  O2 Sat: 6%    Nutritional Alteration  Anorexia: No  Nausea: No   Vomiting: No     Skin Alteration   Sensation:no    Radiation Dermatitis:  no    Mucous Membrane Alteration  Voice Changes/ Stridor/Larynx: no  Pharynx & Esophagus: no    Elimination Alterations  Constipation: no  Diarrhea:  no      Emotional  Coping: somewhat effective      Injury, potential bleeding or infection: no    Other:no    Lab Results   Component Value Date    WBC 6.1 11/15/2021     11/15/2021         /78   Pulse 74   Temp 97.8 °F (36.6 °C) (Temporal)   Resp 18   Wt 159 lb 8 oz (72.3 kg)   SpO2 96%   BMI 23.55 kg/m²   BP within normal range? yes        Assessment/Plan: Pt is tolerating tx well thus far. Pt completed 8/30fx and 1600/6000cGy.      Zulma Murray RN

## 2021-11-17 NOTE — TELEPHONE ENCOUNTER
Met with pt and pt's wife in conjunction with OTV re: positive distress screen. Pt is 81-year old male being treated for lung CA. Pt's mood appeared euthymic with full affect, he appeared A&Ox4, and he was willing/able to participate in session. He appeared appropriately dressed/groomed and was able to ambulate/transfer without assistance. Pt and pt's wife discussed difficulty managing appointments. Noted that they have few social support but do have a nephew that helps them. Pt reported that he is often tired on days that he has chemo/radiation. They denied need for any supportive resources at this time. No additional needs identified at this time. Reviewed role of oncology SW and encouraged pt to notify this provider if additional needs arise.     Ned Vuong, MSW, TRISH-S  Oncology Social Worker

## 2021-11-18 NOTE — PROGRESS NOTES
DEPARTMENT OF RADIATION ONCOLOGY ON TREATMENT VISIT         11/18/2021      NAME:  León Koenig    YOB: 1935    Diagnosis: lung cancer    SUBJECTIVE:   León Koenig has now received fractionated external beam radiation therapy - ongoing. Past medical, surgical, social and family histories reviewed and updated as indicated. Pain: controlled    ALLERGIES:  Patient has no known allergies. Current Outpatient Medications   Medication Sig Dispense Refill    apixaban (ELIQUIS) 5 MG TABS tablet Take 1 tablet by mouth 2 times daily 30 tablet 1    ondansetron (ZOFRAN-ODT) 4 MG disintegrating tablet Take 1 tablet by mouth every 8 hours as needed for Nausea or Vomiting 60 tablet 1    prochlorperazine (COMPAZINE) 10 MG tablet Take 1 tablet by mouth every 6 hours as needed (nausea) 60 tablet 1    isosorbide mononitrate (IMDUR) 30 MG extended release tablet Take 2 tablets by mouth daily 60 tablet 5    PROCTO-MED HC 2.5 % CREA rectal cream APPLY TO AFFECTED AREA TWICE A DAY MAXIMUM OF 2 WEEKS      dilTIAZem (CARDIZEM CD) 120 MG extended release capsule Take 1 capsule by mouth daily 30 capsule 3    bumetanide (BUMEX) 1 MG tablet Take 1 tablet by mouth daily 30 tablet 0    metoprolol succinate (TOPROL XL) 100 MG extended release tablet Take 1 tablet by mouth 2 times daily 60 tablet 3    albuterol-ipratropium (COMBIVENT RESPIMAT)  MCG/ACT AERS inhaler Inhale 2 puffs into the lungs every 6 hours as needed for Wheezing Uses bid, may use up to four times daily for sob      atorvastatin (LIPITOR) 10 MG tablet Take 10 mg by mouth every other day       Cholecalciferol (VITAMIN D3) 2000 UNITS CAPS Take 2,000 Units by mouth daily        No current facility-administered medications for this encounter. OBJECTIVE:  Alert and fully ambulatory. Pleasant and conversant. Physical Examination: General appearance - alert, well appearing, and in no distress.           Wt

## 2021-11-20 NOTE — ED NOTES
Discharged   To follow with pmd  rx x 2 [facility] to [facility]     Gait steady/speech clear     Greg Deluca, RN  11/20/21 0946

## 2021-11-21 NOTE — ED PROVIDER NOTES
drugs. Family History: family history includes Breast Cancer in his sister; Cancer in his sister; Heart Disease in his brother. Allergies: Patient has no known allergies. Physical Exam   Oxygen Saturation Interpretation: Normal.        ED Triage Vitals [11/20/21 1353]   BP Temp Temp Source Pulse Resp SpO2 Height Weight   131/79 97.4 °F (36.3 °C) Tympanic 76 16 99 % -- --         Constitutional:  Alert, development consistent with age. HEENT:  NC/NT. Airway patent. Neck:  Normal ROM. Supple. Respiratory:  Clear to auscultation and breath sounds equal.  CV:  Regular rate and rhythm, normal heart sounds, without pathological murmurs, ectopy, gallops, or rubs. GI:  normal appearing, non-distended with no visible hernias. Bowel sounds: normal bowel sounds. Tenderness: No abdominal tenderness, guarding, rebound, rigidity or pulsatile mass. , There is no rebound tenderness. , There is no guarding. , There is no distension. , There is no pulsatile mass. .        Liver: non-tender and non-palpable. Spleen:  non-tender and non-palpable. /Rectal: /Pelvic examination deferred / declined. Back: CVA Tenderness: No CVA tenderness. Integument:  Normal turgor. Warm, dry, without visible rash, unless noted elsewhere. 2 mm abrasion to the perirectal area  at the 6 o'clock position. Lymphatic: no lymphadenopathy noted  Neurological:  Oriented. Motor functions intact. Lab / Imaging Results   (All laboratory and radiology results have been personally reviewed by myself)  Labs:  No results found for this visit on 11/20/21. Imaging: All Radiology results interpreted by Radiologist unless otherwise noted. XR ACUTE ABD SERIES CHEST 1 VW   Final Result   1. There are no findings of pneumonia or failure   2. There are no findings of bowel obstruction or free air. 3. Emphysematous changes with bilateral pleuroparenchymal pulmonary scarring.    4. Retained stool within the rectum and sigmoid colon consistent with   constipation             ED Course / Medical Decision Making   Medications - No data to display         Consult(s):   None    Procedure(s):   none    MDM:   At this time the patient is without objective evidence of an acute process requiring hospitalization or inpatient management. They have remained hemodynamically stable throughout their entire ED visit and are stable for discharge with outpatient follow-up. The plan has been discussed in detail and they are aware of the specific conditions for emergent return, as well as the importance of follow-up. Patient did have good bowel movement after the Fleet enema was given. Patient states that he was waiting in the toilet when he noticed some bright red-tinged pinpoint blood on the toilet paper rectal exam was performed and a small abrasion was noted on the skin of the rectum. Bacitracin ointment was then placed in the ER and a prescription for Desitin was written. Patient was educated on Colace to increase his fiber and fluid intake as well as close outpatient follow-up with his primary care physician. Patient currently undergoing chemo and radiation therapy for lung cancer. Patient was educated on the importance of staying hydrated he was educated to return the emergency department should any symptoms worsen at any time. Plan of Care/Counseling:  GRACIELA West CNP reviewed today's visit with the patient in addition to providing specific details for the plan of care and counseling regarding the diagnosis and prognosis. Questions are answered at this time and are agreeable with the plan. Assessment      1. Constipation, unspecified constipation type    2. Abrasion      Plan   Discharged home.   Patient condition is good    New Medications     Discharge Medication List as of 11/20/2021  5:30 PM      START taking these medications    Details   docusate sodium (COLACE) 100 MG capsule Take 1 capsule by mouth 2 times daily as needed for Constipation, Disp-30 capsule, R-0Normal      zinc oxide (DESITIN MAXIMUM STRENGTH) 40 % PSTE paste Apply a pea size amount to the anal area as needed for irritation for up to 10 days, Disp-57 g, R-0Normal           Electronically signed by Pansy Councilman, APRN - CNP   DD: 11/20/21  **This report was transcribed using voice recognition software. Every effort was made to ensure accuracy; however, inadvertent computerized transcription errors may be present.   END OF ED PROVIDER NOTE        GRACIELA Underwood CNP  11/20/21 2682

## 2021-11-23 NOTE — PROGRESS NOTES
[address] [facility] ONCOLOGY  [address] [place] [id number]  Dept: [phone]  Attending Progress Note      Reason for Visit: Non-small cell lung cancer. Referring Physician:  Анна Elizabeth MD    PCP:  Noble Yen MD    History of Present Illness:      Mr. Vicky Hernández is a very pleasant 50-year-old gentleman with a past medical history significant for A. 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, status post left upper lobectomy and thoracic lymphadenopathy on 10/29/2004, who was found to have on CT scan of the chest from 6/16/2021 a spiculated right suprahilar mass measuring 3.3 x 2.8 cm, a lobulated spiculated mass in the periphery of the right lower lobe measuring 3.1 x 2.6 cm, undefined; groundglass opacity in the left upper lobe measuring 1.3 x 1.1 cm, few other scattered tiny nodular densities, bilateral pleural effusions, the patient underwent on 7/1/2021 a CT-guided lung biopsy, pathology was consistent with scar tissue with focal epithelial atypia, patient underwent on 1/1/2021 bronchoscopy/EBUS by Dr. Mark Westbrook, biopsies were taken from the right hilar mass, station [id number], biopsy of the hilar mass was positive for malignant cells, non-small cell carcinoma, compatible with non small cell carcinoma could not be determined from the specimen. The patient had a PET scan done on 9/20/2021, revealing metabolic activity associated with a nodular opacity in the medial right upper lobe, compatible with history of lung cancer.  This extends into the right hilar region.  The adjacent ground-glass opacity could be postobstructive changes or potentially related to mild neoplastic extension. Nodular opacity in the lateral right lower lobe is decreasing in size compared to the prior CT scan and has some associated metabolic activity.  The appearance as well as the interval improvement could be due to an infectious etiology including atypical infections.  While this could also be  partly related to malignancy, the interval improved appearance would be unusual for malignancy without treatment. Brain MRI from 1/23/2021 was negative for metastatic disease to the brain. The patient is accompanied by his spouse. No worsening shortness of breath. He was started on radiation therapy on 11/8/2021, chemotherapy was started on 11/9/2021, no nausea or vomiting, has constipation, lower leg edema had improved. Review of Systems;  CONSTITUTIONAL: No fever, chills. appetite and energy level. Pos for weight loss of 30 lbs. ENMT: Eyes: No diplopia; Nose: No epistaxis. Mouth: No sore throat. RESPIRATORY: No hemoptysis, positive for chronic shortness of breath, cough. CARDIOVASCULAR: No chest pain, palpitations. GASTROINTESTINAL: No nausea/vomiting, abdominal pain, positive for constipation. GENITOURINARY: No dysuria, urinary frequency, hematuria. NEURO: No syncope, presyncope, headache.   Remainder:  ROS NEGATIVE    Past Medical History:      Diagnosis Date    A-fib [facility])     AAA (abdominal aortic aneurysm) ([address]     measures 5.0 cm    Cancer ([address] 2005    left lung    CHF (congestive heart failure) ([address]     COPD (chronic obstructive pulmonary disease) (HCC)     Heart failure (HCC)     Hyperlipidemia     Hypertension      Patient Active Problem List   Diagnosis    COPD (chronic obstructive pulmonary disease) (HCC)    Hyperlipidemia LDL goal <100    Essential hypertension    Congestive heart failure (CHF) (HCC)    Lung mass    Atrial fibrillation (HCC)    Abdominal aortic aneurysm (AAA) without rupture (HCC)    Chronic systolic congestive heart failure (HCC)    Non-small cell cancer of right lung ([address]    Malignant neoplasm of lung ([address]        Past Surgical History:      Procedure Laterality Date    BRONCHOSCOPY N/A 9/1/2021    BRONCHOSCOPY W/EBUS FNA performed by Vasquez Alves MD at [address] [place] [address]  BRONCHOSCOPY N/A 2021    BRONCHOSCOPY ADD ON COMPUTER ASSISTED performed by Jenn Alexander MD at [address] [facility]  2021    BRONCHOSCOPY/TRANSBRONCHIAL NEEDLE BIOPSY performed by Jenn Alexander MD at [address] [facility]  2021    BRONCHOSCOPY ALVEOLAR LAVAGE performed by Jenn Alexander MD at [address] TEST N/A 2021    Lexiscan stress test    COLONOSCOPY      years ago    CT NEEDLE BIOPSY LUNG PERCUTANEOUS  2021    CT NEEDLE BIOPSY LUNG PERCUTANEOUS 2021 Mariela Wallace MD [facility] CT    LUNG CANCER SURGERY  2005       Family History:  Family History   Problem Relation Age of Onset    Cancer Sister         melanoma    Breast Cancer Sister     Heart Disease Brother        Medications:  Reviewed and reconciled. Social History:  Social History     Socioeconomic History    Marital status:      Spouse name: Not on file    Number of children: 0    Years of education: Not on file    Highest education level: Not on file   Occupational History    Not on file   Tobacco Use    Smoking status: Former Smoker     Packs/day: 1.00     Years: 10.00     Pack years: 10.00     Types: Cigarettes     Quit date: 2001     Years since quittin.4    Smokeless tobacco: Never Used   Vaping Use    Vaping Use: Never used   Substance and Sexual Activity    Alcohol use: Not Currently     Comment: no ETOH since     Drug use: Never    Sexual activity: Not on file   Other Topics Concern    Not on file   Social History Narrative    Not on file     Social Determinants of Health     Financial Resource Strain:     Difficulty of Paying Living Expenses: Not on file   Food Insecurity:     Worried About [address] in the Last Year: Not on file    [address] in the Last Year: Not on file   Transportation Needs:     Lack of Transportation (Medical): Not on file    Lack of Transportation (Non-Medical):  Not on spiculated mass in the periphery of the right lower lobe measuring 3.1 x 2.6 cm, undefined; groundglass opacity in the left upper lobe measuring 1.3 x 1.1 cm, few other scattered tiny nodular densities, bilateral pleural effusions, the patient underwent on 7/1/2021 a CT-guided lung biopsy, pathology was consistent with scar tissue with focal epithelial atypia, patient underwent on 1/1/2021 bronchoscopy/EBUS by Dr. Prachi Rodríguez, biopsies were taken from the right hilar mass, station [id number], biopsy of the hilar mass was positive for malignant cells, non-small cell carcinoma, compatible with non small cell carcinoma could not be determined from the specimen. The patient had a PET scan done on 9/20/2021, revealing metabolic activity associated with a nodular opacity in the medial right upper lobe, compatible with history of lung cancer.  This extends into the right hilar region.  The adjacent ground-glass opacity could be postobstructive changes or potentially related to mild neoplastic extension. Nodular opacity in the lateral right lower lobe is decreasing in size compared to the prior CT scan and has some associated metabolic activity. The appearance as well as the interval improvement could be due to an infectious etiology including atypical infections.  While this could also be  partly related to malignancy, the interval improved appearance would be unusual for malignancy without treatment. Brain MRI from 1/23/2021 was negative for metastatic disease to the brain. The patient has a newly diagnosed non-small cell lung cancer, could not be determined whether it is adenocarcinoma or squamous cell carcinoma, pathology was sent to [facility] for a second opinion, the case was discussed with Dr. Francisco J Freedman, diagnosis is non-small cell carcinoma, the diagnostic material is limited, some areas show cytoplasmic vacuolization favoring an adenocarcinoma, but there was insufficient material for IHC stains to confirm.  He has clinical stage T2N1M0 (nodular opacity in the right lower lobe is decreasing in size, this is likely of benign etiology, will follow for now, if malignant SBRT can be done). The PET scan images were reviewed with the patient and his spouse. I discussed with them his diagnosis, prognosis and recommendations for treatment. I offered the patient evaluation by thoracic surgery, the patient is not interested in any surgical interventions, which is very reasonable at his age and due to his comorbidities. Concurrent chemoradiation is recommended, adenocarcinoma could not be confirmed, therefore we will treat him with weekly carboplatin and Taxol, the side effects of the treatment were reviewed with the patient, he will have a chemo teach, adjuvant immunotherapy, durvalumab after completion of the chemo RT is recommended. The patient was started on radiation therapy on11/8/2021, was started on weekly carboplatin and Taxol on 11/9/2021, had tolerated the treatment well thus far, today 11/23/2021 labs reviewed, he has leukopenia, and neutropenia, hold carboplatin/Taxol, cycle #3. Recommended bowel regimen. Lower leg edema, Venous US pos for  deep venous thrombosis in the left peroneal veins, probably secondary to malignancy, not Eliquis failure, he has CKD, increase the dose of Eliquis dose to 5 mg po bid, cardiology team was notified. RTC in 1 week    Thank you for allowing us to participate in the care of Mr. Henry Vasquez.     Ryan Reyes MD   HEMATOLOGY/MEDICAL ONCOLOGY  [address] [place] [address] ONCOLOGY  [address] [facility] [address] [facility] [id number]  Dept: [phone]

## 2021-11-24 NOTE — PATIENT INSTRUCTIONS
Continue daily fractionated radiation therapy as scheduled. Please see weekly OTV note and intial consultation letter in [facility] for clinical details. Tomer Vaughn. Celestine Pollard MD MS [facility] [employer]:  [phone]   FAX: [phone] [address]:  [phone]   FAX:    [phone]  [address] [place] [address] Road:  [phone]   FAX:  [phone]  Email: [email or web address]

## 2021-11-24 NOTE — PROGRESS NOTES
DEPARTMENT OF RADIATION ONCOLOGY ON TREATMENT VISIT         11/24/2021      NAME:  Edyta Santos    YOB: 1935    Diagnosis: HN CA    SUBJECTIVE:   Edyta Santos has now received fractionated external beam radiation therapy - ongoing. Past medical, surgical, social and family histories reviewed and updated as indicated. Pain: controlled    ALLERGIES:  Patient has no known allergies.          Current Outpatient Medications   Medication Sig Dispense Refill    lactulose (CEPHULAC) 10 g packet Take 10 g by mouth 2 times daily      docusate sodium (COLACE) 100 MG capsule Take 1 capsule by mouth 2 times daily as needed for Constipation 30 capsule 0    zinc oxide (DESITIN MAXIMUM STRENGTH) 40 % PSTE paste Apply a pea size amount to the anal area as needed for irritation for up to 10 days 57 g 0    apixaban (ELIQUIS) 5 MG TABS tablet Take 1 tablet by mouth 2 times daily 30 tablet 1    ondansetron (ZOFRAN-ODT) 4 MG disintegrating tablet Take 1 tablet by mouth every 8 hours as needed for Nausea or Vomiting 60 tablet 1    prochlorperazine (COMPAZINE) 10 MG tablet Take 1 tablet by mouth every 6 hours as needed (nausea) 60 tablet 1    isosorbide mononitrate (IMDUR) 30 MG extended release tablet Take 2 tablets by mouth daily 60 tablet 5    PROCTO-MED HC 2.5 % CREA rectal cream APPLY TO AFFECTED AREA TWICE A DAY MAXIMUM OF 2 WEEKS      dilTIAZem (CARDIZEM CD) 120 MG extended release capsule Take 1 capsule by mouth daily 30 capsule 3    bumetanide (BUMEX) 1 MG tablet Take 1 tablet by mouth daily 30 tablet 0    metoprolol succinate (TOPROL XL) 100 MG extended release tablet Take 1 tablet by mouth 2 times daily 60 tablet 3    albuterol-ipratropium (COMBIVENT RESPIMAT)  MCG/ACT AERS inhaler Inhale 2 puffs into the lungs every 6 hours as needed for Wheezing Uses bid, may use up to four times daily for sob      atorvastatin (LIPITOR) 10 MG tablet Take 10 mg by mouth every other day  Cholecalciferol (VITAMIN D3) 2000 UNITS CAPS Take 2,000 Units by mouth daily        No current facility-administered medications for this encounter. OBJECTIVE:  Alert and fully ambulatory. Pleasant and conversant. Physical Examination: General appearance - alert, well appearing, and in no distress. Wt Readings from Last 3 Encounters:   11/24/21 148 lb (67.1 kg)   11/23/21 149 lb 8 oz (67.8 kg)   11/17/21 159 lb 8 oz (72.3 kg)         ASSESSMENT/PLAN:     Patient is tolerating treatments well with expected toxicities. RBA were reviewed prior to first fraction and PRN. Current and planned dose reviewed. Goals of treatment and potential side effects were reviewed with the patient PRN. Treatment imaging has been personally reviewed for accuracy and precision. Questions answered to apparent satisfaction. Treatments will continue as planned. Katia Ibrahim.  Flory Norman MD MS DABR  Radiation Oncologist        [facility] ([address] [place] [address]): [phone] /// FAX: [phone]  [facility]): [phone] /// FAX: [phone]  [address] [place] [address]): [phone] /// FAX: [phone]

## 2021-11-24 NOTE — PROGRESS NOTES
[facility]  11/24/2021  Wt Readings from Last 3 Encounters:   11/24/21 148 lb (67.1 kg)   11/23/21 149 lb 8 oz (67.8 kg)   11/17/21 159 lb 8 oz (72.3 kg)     Body mass index is 21.86 kg/m². Treatment Area:CTV right lung    Patient was seen today for weekly visit. Comfort Alteration  KPS:70%  Fatigue: Mild    Ventilation Alterations  Cough: No  Hemoptysis: No  Mucus Color: na  Dyspnea: No  O2 Sat: 99%    Nutritional Alteration  Anorexia: No  Nausea: No   Vomiting: No     Skin Alteration   Sensation:good and pink, wife applying lotion    Radiation Dermatitis:  na    Mucous Membrane Alteration  Voice Changes/ Stridor/Larynx: no  Pharynx & Esophagus: na    Elimination Alterations  Constipation: yes  Diarrhea:  no      Emotional  Coping: effective      Injury, potential bleeding or infection: na    Other:na    Lab Results   Component Value Date    WBC 1.9 (L) 11/22/2021     (L) 11/22/2021         /74   Pulse 87   Temp 97.1 °F (36.2 °C) (Skin)   Resp 18   Wt 148 lb (67.1 kg)   SpO2 99%   BMI 21.86 kg/m²   BP within normal range?  yes           Assessment/Plan: 14/30fx  2800/6000cGY and tolerated    Shila Alba RN

## 2021-11-30 NOTE — PROGRESS NOTES
Patient complaining of unable to focus, legs restless. Denies any further complaints. No dyspnea, itching, etc.  Assured patient the benadryl has these effects. Will discuss with provider, decreasing dose of benadryl next treatment.

## 2021-11-30 NOTE — PROGRESS NOTES
[address] MED ONCOLOGY  [address] [place] [id number]  Dept: [phone]  Attending Progress Note      Reason for Visit: Non-small cell lung cancer. Referring Physician:  Kizzy Fenton MD    PCP:  Kristie Nascimento MD    History of Present Illness:      Mr. Yordan Gorman is a very pleasant 62-year-old gentleman with a past medical history significant for A. 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, status post left upper lobectomy and thoracic lymphadenopathy on 10/29/2004, who was found to have on CT scan of the chest from 6/16/2021 a spiculated right suprahilar mass measuring 3.3 x 2.8 cm, a lobulated spiculated mass in the periphery of the right lower lobe measuring 3.1 x 2.6 cm, undefined; groundglass opacity in the left upper lobe measuring 1.3 x 1.1 cm, few other scattered tiny nodular densities, bilateral pleural effusions, the patient underwent on 7/1/2021 a CT-guided lung biopsy, pathology was consistent with scar tissue with focal epithelial atypia, patient underwent on 1/1/2021 bronchoscopy/EBUS by Dr. Prachi Rodríguez, biopsies were taken from the right hilar mass, station [id number], biopsy of the hilar mass was positive for malignant cells, non-small cell carcinoma, compatible with non small cell carcinoma could not be determined from the specimen. The patient had a PET scan done on 9/20/2021, revealing metabolic activity associated with a nodular opacity in the medial right upper lobe, compatible with history of lung cancer.  This extends into the right hilar region.  The adjacent ground-glass opacity could be postobstructive changes or potentially related to mild neoplastic extension. Nodular opacity in the lateral right lower lobe is decreasing in size compared to the prior CT scan and has some associated metabolic activity.  The appearance as well as the interval improvement could be due to an infectious etiology including atypical infections.  While this could also be  partly related to malignancy, the interval improved appearance would be unusual for malignancy without treatment. Brain MRI from 1/23/2021 was negative for metastatic disease to the brain. The patient is accompanied by his spouse. No worsening shortness of breath. He was started on radiation therapy on 11/8/2021, chemotherapy was started on 11/9/2021, no nausea or vomiting, pulmonary edema had improved. Review of Systems;  CONSTITUTIONAL: No fever, chills. appetite and energy level. Pos for weight loss of 30 lbs. ENMT: Eyes: No diplopia; Nose: No epistaxis. Mouth: No sore throat. RESPIRATORY: No hemoptysis, positive for chronic shortness of breath, cough. CARDIOVASCULAR: No chest pain, palpitations. GASTROINTESTINAL: No nausea/vomiting, abdominal pain, positive for constipation. GENITOURINARY: No dysuria, urinary frequency, hematuria. NEURO: No syncope, presyncope, headache.   Remainder:  ROS NEGATIVE    Past Medical History:      Diagnosis Date    A-fib [facility])     AAA (abdominal aortic aneurysm) ([address]     measures 5.0 cm    Cancer ([address] 2005    left lung    CHF (congestive heart failure) ([address]     COPD (chronic obstructive pulmonary disease) (HCC)     Heart failure (HCC)     Hyperlipidemia     Hypertension      Patient Active Problem List   Diagnosis    COPD (chronic obstructive pulmonary disease) (HCC)    Hyperlipidemia LDL goal <100    Essential hypertension    Congestive heart failure (CHF) (HCC)    Lung mass    Atrial fibrillation (HCC)    Abdominal aortic aneurysm (AAA) without rupture (HCC)    Chronic systolic congestive heart failure (HCC)    Non-small cell cancer of right lung ([address]    Malignant neoplasm of lung ([address]        Past Surgical History:      Procedure Laterality Date    BRONCHOSCOPY N/A 9/1/2021    BRONCHOSCOPY W/EBUS FNA performed by John Pepe MD at [facility] N/A 2021    BRONCHOSCOPY ADD ON COMPUTER ASSISTED performed by Aleena Brooks MD at [address]  2021    BRONCHOSCOPY/TRANSBRONCHIAL NEEDLE BIOPSY performed by Aleena Brooks MD at [id number] [place] [address]  2021    BRONCHOSCOPY ALVEOLAR LAVAGE performed by Aleena Brooks MD at [address] TEST N/A 2021    Lexiscan stress test    COLONOSCOPY      years ago    CT NEEDLE BIOPSY LUNG PERCUTANEOUS  2021    CT NEEDLE BIOPSY LUNG PERCUTANEOUS 2021 Macdonald Gosselin, MD [facility] CT    LUNG CANCER SURGERY  2005       Family History:  Family History   Problem Relation Age of Onset    Cancer Sister         melanoma    Breast Cancer Sister     Heart Disease Brother        Medications:  Reviewed and reconciled. Social History:  Social History     Socioeconomic History    Marital status:      Spouse name: Not on file    Number of children: 0    Years of education: Not on file    Highest education level: Not on file   Occupational History    Not on file   Tobacco Use    Smoking status: Former Smoker     Packs/day: 1.00     Years: 10.00     Pack years: 10.00     Types: Cigarettes     Quit date: 2001     Years since quittin.4    Smokeless tobacco: Never Used   Vaping Use    Vaping Use: Never used   Substance and Sexual Activity    Alcohol use: Not Currently     Comment: no ETOH since     Drug use: Never    Sexual activity: Not on file   Other Topics Concern    Not on file   Social History Narrative    Not on file     Social Determinants of Health     Financial Resource Strain:     Difficulty of Paying Living Expenses: Not on file   Food Insecurity:     Worried About [address] in the Last Year: Not on file    [address] in the Last Year: Not on file   Transportation Needs:     Lack of Transportation (Medical): Not on file    Lack of Transportation (Non-Medical):  Not on file   Physical Activity:     Days of Exercise per Week: Not on file    Minutes of Exercise per Session: Not on file   Stress:     Feeling of Stress : Not on file   Social Connections:     Frequency of Communication with Friends and Family: Not on file    Frequency of Social Gatherings with Friends and Family: Not on file    Attends Yazidi Services: Not on file    Active Member of [address] [place] [address] or Organizations: Not on file    Attends Club or Organization Meetings: Not on file    Marital Status: Not on file   Intimate Partner Violence:     Fear of Current or Ex-Partner: Not on file    Emotionally Abused: Not on file    Physically Abused: Not on file    Sexually Abused: Not on file   Housing Stability:     Unable to Pay for Housing in the Last Year: Not on file    Number of [place] in the Last Year: Not on file    Unstable Housing in the Last Year: Not on file       Allergies:  No Known Allergies    Physical Exam:  /67   Pulse 85   Temp 97.2 °F (36.2 °C)   Resp 17   Wt 145 lb 12.8 oz (66.1 kg)   SpO2 98%   BMI 21.53 kg/m²   GENERAL: Alert, oriented x 3, not in acute distress. HEENT: PERRLA; EOMI. Oropharynx clear. NECK: Supple. No palpable cervical or supraclavicular lymphadenopathy. LUNGS: Decreased air entry bilaterally. CARDIOVASCULAR: Regular rhythm. ABDOMEN: Soft. Non-tender, non-distended. Positive bowel sounds. EXTREMITIES: Mild bilateral lower leg edema left greater than right. NEUROLOGIC: No focal deficits.    ECOG PS 1       Impression/Plan:       Mr. Henny Cueva is a very pleasant 26-year-old gentleman with a past medical history significant for A. 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 The PET scan images were reviewed with the patient and his spouse. I discussed with them his diagnosis, prognosis and recommendations for treatment. I offered the patient evaluation by thoracic surgery, the patient is not interested in any surgical interventions, which is very reasonable at his age and due to his comorbidities. Concurrent chemoradiation is recommended, adenocarcinoma could not be confirmed, therefore we will treat him with weekly carboplatin and Taxol, the side effects of the treatment were reviewed with the patient, he will have a chemo teach, adjuvant immunotherapy, durvalumab after completion of the chemo RT is recommended. The patient was started on radiation therapy on11/8/2021, was started on weekly carboplatin and Taxol on 11/9/2021, chemotherapy was held last week due to leukopenia and neutropenia, he is doing well clinically at this time, the white count is ANC has improved, 1370 today, recommended to discontinue carboplatin and continue single agent Taxol, proceed with chemotherapy, cycle #3. Will use G-CSF as needed. Recommend bowel regimen. Lower leg edema, Venous US pos for  deep venous thrombosis in the left peroneal veins, probably secondary to malignancy, not Eliquis failure, he has CKD, increase the dose of Eliquis dose to 5 mg po bid, cardiology team was notified. RTC in 1 week    Thank you for allowing us to participate in the care of Mr. Fifi Saxena     Cheryle Port, MD   HEMATOLOGY/MEDICAL ONCOLOGY  [address] [place] [address] MED ONCOLOGY  [address] [facility] [address] [facility] [id number]  Dept: [phone]

## 2021-12-01 NOTE — PROGRESS NOTES
Edyta Men  12/1/2021  Wt Readings from Last 3 Encounters:   12/01/21 148 lb 9.6 oz (67.4 kg)   11/30/21 145 lb 12.8 oz (66.1 kg)   11/24/21 148 lb (67.1 kg)     Body mass index is 21.94 kg/m². Treatment Area:CTV right lung    Patient was seen today for weekly visit. Comfort Alteration  KPS:80%  Fatigue: Moderate    Ventilation Alterations  Cough: No  Hemoptysis: No  Mucus Color: no  Dyspnea: No  O2 Sat: 93%    Nutritional Alteration  Anorexia: No  Nausea: No   Vomiting: No     Skin Alteration   Sensation:good and using lotion    Radiation Dermatitis:  na    Mucous Membrane Alteration  Voice Changes/ Stridor/Larynx: no  Pharynx & Esophagus: na    Elimination Alterations  Constipation: yes  Diarrhea:  no      Emotional  Coping: effective      Injury, potential bleeding or infection: na    Other:na    Lab Results   Component Value Date    WBC 2.9 (L) 11/29/2021     11/29/2021         /74   Pulse 106   Temp 97.1 °F (36.2 °C) (Skin)   Resp 18   Wt 148 lb 9.6 oz (67.4 kg)   SpO2 93%   BMI 21.94 kg/m²   BP within normal range?  yes           Assessment/Plan: 17/30fx  3400/6000cGY and tolerating    Juanjose Montiel RN

## 2021-12-06 NOTE — PROGRESS NOTES
DEPARTMENT OF RADIATION ONCOLOGY ON TREATMENT VISIT       12/1/21      NAME:  Cuca Conte    YOB: 1935    Diagnosis: lung cancer    SUBJECTIVE:   Cuca Conte has now received fractionated external beam radiation therapy - ongoing. Past medical, surgical, social and family histories reviewed and updated as indicated. Pain: controlled    ALLERGIES:  Patient has no known allergies.          Current Outpatient Medications   Medication Sig Dispense Refill    lactulose (CEPHULAC) 10 g packet Take 10 g by mouth 2 times daily      docusate sodium (COLACE) 100 MG capsule Take 1 capsule by mouth 2 times daily as needed for Constipation 30 capsule 0    zinc oxide (DESITIN MAXIMUM STRENGTH) 40 % PSTE paste Apply a pea size amount to the anal area as needed for irritation for up to 10 days 57 g 0    apixaban (ELIQUIS) 5 MG TABS tablet Take 1 tablet by mouth 2 times daily 30 tablet 1    ondansetron (ZOFRAN-ODT) 4 MG disintegrating tablet Take 1 tablet by mouth every 8 hours as needed for Nausea or Vomiting 60 tablet 1    prochlorperazine (COMPAZINE) 10 MG tablet Take 1 tablet by mouth every 6 hours as needed (nausea) 60 tablet 1    isosorbide mononitrate (IMDUR) 30 MG extended release tablet Take 2 tablets by mouth daily 60 tablet 5    PROCTO-MED HC 2.5 % CREA rectal cream APPLY TO AFFECTED AREA TWICE A DAY MAXIMUM OF 2 WEEKS      dilTIAZem (CARDIZEM CD) 120 MG extended release capsule Take 1 capsule by mouth daily 30 capsule 3    bumetanide (BUMEX) 1 MG tablet Take 1 tablet by mouth daily 30 tablet 0    metoprolol succinate (TOPROL XL) 100 MG extended release tablet Take 1 tablet by mouth 2 times daily 60 tablet 3    albuterol-ipratropium (COMBIVENT RESPIMAT)  MCG/ACT AERS inhaler Inhale 2 puffs into the lungs every 6 hours as needed for Wheezing Uses bid, may use up to four times daily for sob      atorvastatin (LIPITOR) 10 MG tablet Take 10 mg by mouth every other day       Cholecalciferol (VITAMIN D3) 2000 UNITS CAPS Take 2,000 Units by mouth daily        No current facility-administered medications for this encounter. OBJECTIVE:  Alert and fully ambulatory. Pleasant and conversant. Physical Examination: General appearance - alert, well appearing, and in no distress. Wt Readings from Last 3 Encounters:   12/01/21 148 lb 9.6 oz (67.4 kg)   11/30/21 145 lb 12.8 oz (66.1 kg)   11/24/21 148 lb (67.1 kg)         ASSESSMENT/PLAN:     Patient is tolerating treatments well with expected toxicities. RBA were reviewed prior to first fraction and PRN. Current and planned dose reviewed. Goals of treatment and potential side effects were reviewed with the patient PRN. Treatment imaging has been personally reviewed for accuracy and precision. Questions answered to apparent satisfaction. Treatments will continue as planned. Birgit Mendieta.  Paras Phipps MD MS DABR  Radiation Oncologist        [facility] ([address] [place] [address]): [phone] /// FAX: [phone]  [facility]): [phone] /// FAX: [phone]  [address] [place] [address]): [phone] /// FAX: [phone]

## 2021-12-06 NOTE — PATIENT INSTRUCTIONS
Continue daily fractionated radiation therapy as scheduled. Please see weekly OTV note and intial consultation letter in [facility] for clinical details. Barby Bean. Deshawn Man MD MS [address]:  [phone]   FAX: [phone]  [facility] Street:  [phone]   FAX:    [phone]  [address] [place] [address] Road:  [phone]   FAX:  [phone]  Email: [email or web address]

## 2021-12-07 NOTE — PROGRESS NOTES
[address] MED ONCOLOGY  [address] [place] [id number]  Dept: [phone]  Attending Progress Note      Reason for Visit: Non-small cell lung cancer. Referring Physician:  Dedrick Beltran MD    PCP:  Rich Hinojosa MD    History of Present Illness:      Mr. Lino Grossman is a very pleasant 27-year-old gentleman with a past medical history significant for A. 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, status post left upper lobectomy and thoracic lymphadenopathy on 10/29/2004, who was found to have on CT scan of the chest from 6/16/2021 a spiculated right suprahilar mass measuring 3.3 x 2.8 cm, a lobulated spiculated mass in the periphery of the right lower lobe measuring 3.1 x 2.6 cm, undefined; groundglass opacity in the left upper lobe measuring 1.3 x 1.1 cm, few other scattered tiny nodular densities, bilateral pleural effusions, the patient underwent on 7/1/2021 a CT-guided lung biopsy, pathology was consistent with scar tissue with focal epithelial atypia, patient underwent on 1/1/2021 bronchoscopy/EBUS by Dr. Jaime Gould, biopsies were taken from the right hilar mass, station [id number], biopsy of the hilar mass was positive for malignant cells, non-small cell carcinoma, compatible with non small cell carcinoma could not be determined from the specimen. The patient had a PET scan done on 9/20/2021, revealing metabolic activity associated with a nodular opacity in the medial right upper lobe, compatible with history of lung cancer.  This extends into the right hilar region.  The adjacent ground-glass opacity could be postobstructive changes or potentially related to mild neoplastic extension. Nodular opacity in the lateral right lower lobe is decreasing in size compared to the prior CT scan and has some associated metabolic activity.  The appearance as well as the interval improvement could be due to an infectious etiology including atypical infections.  While this could also be  partly related to malignancy, the interval improved appearance would be unusual for malignancy without treatment. Brain MRI from 1/23/2021 was negative for metastatic disease to the brain. The patient is accompanied by his spouse. No worsening shortness of breath. He was started on radiation therapy on 11/8/2021, chemotherapy was started on 11/9/2021, no nausea or vomiting, no leg edema had improved. Constipation is better. Review of Systems;  CONSTITUTIONAL: No fever, chills. appetite and energy level. Pos for weight loss of 30 lbs. ENMT: Eyes: No diplopia; Nose: No epistaxis. Mouth: No sore throat. RESPIRATORY: No hemoptysis, positive for chronic shortness of breath, cough. CARDIOVASCULAR: No chest pain, palpitations. GASTROINTESTINAL: No nausea/vomiting, abdominal pain, positive for constipation. GENITOURINARY: No dysuria, urinary frequency, hematuria. NEURO: No syncope, presyncope, headache.   Remainder:  ROS NEGATIVE    Past Medical History:      Diagnosis Date    A-fib [facility])     AAA (abdominal aortic aneurysm) ([address]     measures 5.0 cm    Cancer ([address] 2005    left lung    CHF (congestive heart failure) ([address]     COPD (chronic obstructive pulmonary disease) (HCC)     Heart failure (HCC)     Hyperlipidemia     Hypertension      Patient Active Problem List   Diagnosis    COPD (chronic obstructive pulmonary disease) (HCC)    Hyperlipidemia LDL goal <100    Essential hypertension    Congestive heart failure (CHF) (HCC)    Lung mass    Atrial fibrillation (HCC)    Abdominal aortic aneurysm (AAA) without rupture (HCC)    Chronic systolic congestive heart failure (HCC)    Non-small cell cancer of right lung (HCC)    Malignant neoplasm of lung ([address]        Past Surgical History:      Procedure Laterality Date    BRONCHOSCOPY N/A 9/1/2021    BRONCHOSCOPY W/EBUS FNA performed by Jenn [facility] MD [facility] ENDOSCOPY    BRONCHOSCOPY N/A 2021    BRONCHOSCOPY ADD ON COMPUTER ASSISTED performed by Jenn Alexander MD at [address]  2021    BRONCHOSCOPY/TRANSBRONCHIAL NEEDLE BIOPSY performed by Jenn Alexander MD at [address]  2021    BRONCHOSCOPY ALVEOLAR LAVAGE performed by Jenn Alexander MD at [address] TEST N/A 2021    Lexiscan stress test    COLONOSCOPY      years ago    CT NEEDLE BIOPSY LUNG PERCUTANEOUS  2021    CT NEEDLE BIOPSY LUNG PERCUTANEOUS 2021 Mariela Wallace MD [facility] CT    LUNG CANCER SURGERY  2005       Family History:  Family History   Problem Relation Age of Onset    Cancer Sister         melanoma    Breast Cancer Sister     Heart Disease Brother        Medications:  Reviewed and reconciled. Social History:  Social History     Socioeconomic History    Marital status:      Spouse name: Not on file    Number of children: 0    Years of education: Not on file    Highest education level: Not on file   Occupational History    Not on file   Tobacco Use    Smoking status: Former Smoker     Packs/day: 1.00     Years: 10.00     Pack years: 10.00     Types: Cigarettes     Quit date: 2001     Years since quittin.4    Smokeless tobacco: Never Used   Vaping Use    Vaping Use: Never used   Substance and Sexual Activity    Alcohol use: Not Currently     Comment: no ETOH since     Drug use: Never    Sexual activity: Not on file   Other Topics Concern    Not on file   Social History Narrative    Not on file     Social Determinants of Health     Financial Resource Strain:     Difficulty of Paying Living Expenses: Not on file   Food Insecurity:     Worried About [address] in the Last Year: Not on file    [address] [facility] [address] in the Last Year: Not on file   Transportation Needs:     Lack of Transportation (Medical):  Not on file    Lack of Transportation (Non-Medical): Not on file   Physical Activity:     Days of Exercise per Week: Not on file    Minutes of Exercise per Session: Not on file   Stress:     Feeling of Stress : Not on file   Social Connections:     Frequency of Communication with Friends and Family: Not on file    Frequency of Social Gatherings with Friends and Family: Not on file    Attends Denominational Services: Not on file    Active Member of [address] [place] [address] or Organizations: Not on file    Attends Club or Organization Meetings: Not on file    Marital Status: Not on file   Intimate Partner Violence:     Fear of Current or Ex-Partner: Not on file    Emotionally Abused: Not on file    Physically Abused: Not on file    Sexually Abused: Not on file   Housing Stability:     Unable to Pay for Housing in the Last Year: Not on file    Number of [place] in the Last Year: Not on file    Unstable Housing in the Last Year: Not on file       Allergies:  No Known Allergies    Physical Exam:  /71   Pulse 81   Temp 97.2 °F (36.2 °C)   Ht 5' 9\" (1.753 m)   Wt 148 lb 8 oz (67.4 kg)   SpO2 96%   BMI 21.93 kg/m²   GENERAL: Alert, oriented x 3, not in acute distress. HEENT: PERRLA; EOMI. Oropharynx clear. NECK: Supple. No palpable cervical or supraclavicular lymphadenopathy. LUNGS: Decreased air entry bilaterally. CARDIOVASCULAR: Regular rhythm. ABDOMEN: Soft. Non-tender, non-distended. Positive bowel sounds. EXTREMITIES: Mild bilateral lower leg edema left greater than right. NEUROLOGIC: No focal deficits.    ECOG PS 1       Impression/Plan:       Mr. Bri Almanza is a very pleasant 81-year-old gentleman with a past medical history significant for A. 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, status post left upper lobectomy and thoracic lymphadenopathy on 10/29/2004, who was found to have on CT scan of the chest from 6/16/2021 a spiculated right suprahilar mass measuring 3.3 x 2.8 cm, a lobulated spiculated mass in the periphery of the right lower lobe measuring 3.1 x 2.6 cm, undefined; groundglass opacity in the left upper lobe measuring 1.3 x 1.1 cm, few other scattered tiny nodular densities, bilateral pleural effusions, the patient underwent on 7/1/2021 a CT-guided lung biopsy, pathology was consistent with scar tissue with focal epithelial atypia, patient underwent on 1/1/2021 bronchoscopy/EBUS by Dr. Gabi Crawford, biopsies were taken from the right hilar mass, station [id number], biopsy of the hilar mass was positive for malignant cells, non-small cell carcinoma, compatible with non small cell carcinoma could not be determined from the specimen. The patient had a PET scan done on 9/20/2021, revealing metabolic activity associated with a nodular opacity in the medial right upper lobe, compatible with history of lung cancer.  This extends into the right hilar region.  The adjacent ground-glass opacity could be postobstructive changes or potentially related to mild neoplastic extension. Nodular opacity in the lateral right lower lobe is decreasing in size compared to the prior CT scan and has some associated metabolic activity. The appearance as well as the interval improvement could be due to an infectious etiology including atypical infections.  While this could also be  partly related to malignancy, the interval improved appearance would be unusual for malignancy without treatment. Brain MRI from 1/23/2021 was negative for metastatic disease to the brain.       The patient has a newly diagnosed non-small cell lung cancer, could not be determined whether it is adenocarcinoma or squamous cell carcinoma, pathology was sent to [facility] for a second opinion, the case was discussed with Dr. Natasha Hutchins, diagnosis is non-small cell carcinoma, the diagnostic material is limited, some areas show cytoplasmic vacuolization favoring an adenocarcinoma, but there was insufficient material for IHC stains to confirm. He has clinical stage T2N1M0 (nodular opacity in the right lower lobe is decreasing in size, this is likely of benign etiology, will follow for now, if malignant SBRT can be done). The PET scan images were reviewed with the patient and his spouse. I discussed with them his diagnosis, prognosis and recommendations for treatment. I offered the patient evaluation by thoracic surgery, the patient is not interested in any surgical interventions, which is very reasonable at his age and due to his comorbidities. Concurrent chemoradiation is recommended, adenocarcinoma could not be confirmed, therefore we will treat him with weekly carboplatin and Taxol, the side effects of the treatment were reviewed with the patient, he will have a chemo teach, adjuvant immunotherapy, durvalumab after completion of the chemo RT is recommended. The patient was started on radiation therapy on11/8/2021, was started on weekly carboplatin and Taxol on 11/9/2021, the patient had myelosuppression, carboplatin was discontinued, he was on single agent Taxol, today 12/7/2021 labs reviewed, blood counts adequate for treatment, the patient is doing well clinically, proceed with Taxol, cycle #4. Lower leg edema, Venous US pos for  deep venous thrombosis in the left peroneal veins, probably secondary to malignancy, not Eliquis failure, he has CKD, increase the dose of Eliquis dose to 5 mg po bid, cardiology team was notified. RTC in 1 week. Thank you for allowing us to participate in the care of Mr. Henry Vasquez.     Ryan Reyes MD   HEMATOLOGY/MEDICAL ONCOLOGY  [address] [place] [address] MED ONCOLOGY  [facility] [address] [facility] [address] [address]  Dept: [phone]

## 2021-12-07 NOTE — PROGRESS NOTES
Sarahy Valle  12/7/2021  Wt Readings from Last 10 Encounters:   12/07/21 148 lb 8 oz (67.4 kg)   12/01/21 148 lb 9.6 oz (67.4 kg)   11/30/21 145 lb 12.8 oz (66.1 kg)   11/24/21 148 lb (67.1 kg)   11/23/21 149 lb 8 oz (67.8 kg)   11/17/21 159 lb 8 oz (72.3 kg)   11/16/21 157 lb 6.4 oz (71.4 kg)   11/11/21 156 lb 3.2 oz (70.9 kg)   11/09/21 159 lb (72.1 kg)   11/04/21 150 lb 9.6 oz (68.3 kg)     Ht Readings from Last 1 Encounters:   12/07/21 5' 9\" (1.753 m)     Body mass index is 21.93 kg/m². Met with patient and his wife per request, re: issues with constipation. Patient is struggling with constipation, it becomes very uncomfortable at times, he is taking Lactulose, has taken for the past two days, but does not desire taking these pills every day in order to go. He is asking if there is another option for him. Spoke with patient and his wife about his fluid intake, his calorie needs and reviewed his current intake. He is deficient in overall fluids, only consuming about 1/2 gatorade per day, then sometimes some juice or milk. He is eating soups, some soft meats, sandwiches and oatmeal. He was provided with recommendations for food to add to his diet in order to promote more regular BM's. Spoke with him about goals for increasing his PO intake to stimulate more BM's, instructed him to take popsicles, jello or broth based soups as items with fluid added. He was encouraged to drink Boost, he does drink it once per day. He was then encouraged to start Senokot-s for a better option for providing safe, controlled BM's. However, if he does not desire a pill, spoke with him about ability to start metamucil twice daily, but advised him he MUST drink it with a full glass of water or juice. He was receptive. Encouraged to call with any other questions or concerns.     Weight change: 5-10# loss in 21mo (4-6%)  Appetite: fair  N/V/D/C: constipation   Calculated Needs if applicable: 2251YIEV (26P16-88), 85-90gm PRO (67x1.3), 2000-2200ml (67x32)    Pre-Hab Eligible?:  no        Recommendations: Continue Boost Plus 1-2 per day (increase if meal intake no adequate) provides 360-720kcal, 14-28gm PRO; Pt is to aim to consume 3-5 meals daily, and encouraged hydration 64oz water or gatorade; Encouraged taking water bottle with him everywhere. Begin Metamucil, one scoop mixed with 6oz water in AM and PM (BID); Consider Senokot for more regular BM's. [employer] GUIDELINES FOR CLINICAL CHARACTERISTICS OF MALNUTRITION IN CHRONIC ILLNESS     Moderate Malnutrition  Severe Malnutrition    Energy intake  <75% energy intake compared to estimated needs for >1month <75% energy intake compared to estimated needs for >1month   Weight changes  5% x 1 month  7.5% x 3 months   10% x 6 months   20% x 1 year  >5% x 1 month  >7.5% x 3 months   >10% x 6 months   >20% x 1 year    Physical findings  Mild   Decrease subcutaneous fat    Decrease muscle mass     Increase fluid/edema   Severe  Decrease subcutaneous fat    Decrease muscle mass     Increase fluid/edema      Moderate malnutrition due to 6% weight loss noted in 1 mo, with weight fluctuation and concurrent chemoradiation.       Adama Infante, ADAM, LD

## 2021-12-07 NOTE — PROGRESS NOTES
Patient is to not get Filgrastim today as labs are adequate for treatment and due to single agent only paclitaxel, Filgrastim is only to be used as needed for future treatments. Discontinued Filgrastim today as ANC 2.95. Labs reviewed by Dr. Srinivas Elise. Agreed to above dose recommendations per Dr. Srinivas Elise that it is okay to cancel order for Filgrastim.   Electronically signed by Felipe George, [address] [place] [address] on 12/7/2021 at 9:08 AM

## 2021-12-08 NOTE — PROGRESS NOTES
Sonia Haywood  12/8/2021  Wt Readings from Last 3 Encounters:   12/08/21 150 lb (68 kg)   12/07/21 148 lb 8 oz (67.4 kg)   12/01/21 148 lb 9.6 oz (67.4 kg)     Body mass index is 22.15 kg/m². Treatment Area  ctv right lung    Patient was seen today for weekly visit. Comfort Alteration  KPS:80%  Fatigue: Moderate    Ventilation Alterations  Cough: No  Hemoptysis: No  Mucus Color: na  Dyspnea: No  O2 Sat: 98%    Nutritional Alteration  Anorexia: No  Nausea: No   Vomiting: No     Skin Alteration   Sensation:good and using lotion    Radiation Dermatitis:  na    Mucous Membrane Alteration  Voice Changes/ Stridor/Larynx: no  Pharynx & Esophagus: na    Elimination Alterations  Constipation: no  Diarrhea:  no      Emotional  Coping: effective      Injury, potential bleeding or infection: na    Other:na    Lab Results   Component Value Date    WBC 3.7 (L) 12/06/2021     12/06/2021         /78   Pulse 103   Temp 97.1 °F (36.2 °C) (Skin)   Resp 18   Wt 150 lb (68 kg)   SpO2 98%   BMI 22.15 kg/m²   BP within normal range?  yes           Assessment/Plan: 22/30fx  4400/6000cGY and tolerating will    Daria Mar RN

## 2021-12-09 NOTE — PATIENT INSTRUCTIONS
Continue daily fractionated radiation therapy as scheduled. Please see weekly OTV note and intial consultation letter in [facility] for clinical details. Mally Clayton. Zbigniew Staley MD MS [phone]   FAX: [phone]  [facility]:  [phone]   FAX:    [phone]  [address] [place] [address] Road:  [phone]   FAX:  [phone]  Email: [email or web address]

## 2021-12-09 NOTE — PROGRESS NOTES
DEPARTMENT OF RADIATION ONCOLOGY ON TREATMENT VISIT         12/9/2021      NAME:  Steven Cerna    YOB: 1935    Diagnosis: lung ca    SUBJECTIVE:   Steven Cerna has now received fractionated external beam radiation therapy - ongoing. Past medical, surgical, social and family histories reviewed and updated as indicated. Pain: controlled    ALLERGIES:  Patient has no known allergies.          Current Outpatient Medications   Medication Sig Dispense Refill    lactulose (CEPHULAC) 10 g packet Take 10 g by mouth 2 times daily      docusate sodium (COLACE) 100 MG capsule Take 1 capsule by mouth 2 times daily as needed for Constipation 30 capsule 0    zinc oxide (DESITIN MAXIMUM STRENGTH) 40 % PSTE paste Apply a pea size amount to the anal area as needed for irritation for up to 10 days 57 g 0    apixaban (ELIQUIS) 5 MG TABS tablet Take 1 tablet by mouth 2 times daily 30 tablet 1    ondansetron (ZOFRAN-ODT) 4 MG disintegrating tablet Take 1 tablet by mouth every 8 hours as needed for Nausea or Vomiting 60 tablet 1    prochlorperazine (COMPAZINE) 10 MG tablet Take 1 tablet by mouth every 6 hours as needed (nausea) 60 tablet 1    isosorbide mononitrate (IMDUR) 30 MG extended release tablet Take 2 tablets by mouth daily 60 tablet 5    PROCTO-MED HC 2.5 % CREA rectal cream APPLY TO AFFECTED AREA TWICE A DAY MAXIMUM OF 2 WEEKS      dilTIAZem (CARDIZEM CD) 120 MG extended release capsule Take 1 capsule by mouth daily 30 capsule 3    bumetanide (BUMEX) 1 MG tablet Take 1 tablet by mouth daily 30 tablet 0    metoprolol succinate (TOPROL XL) 100 MG extended release tablet Take 1 tablet by mouth 2 times daily 60 tablet 3    albuterol-ipratropium (COMBIVENT RESPIMAT)  MCG/ACT AERS inhaler Inhale 2 puffs into the lungs every 6 hours as needed for Wheezing Uses bid, may use up to four times daily for sob      atorvastatin (LIPITOR) 10 MG tablet Take 10 mg by mouth every other day       Cholecalciferol (VITAMIN D3) 2000 UNITS CAPS Take 2,000 Units by mouth daily        No current facility-administered medications for this encounter. OBJECTIVE:  Alert and fully ambulatory. Pleasant and conversant. Physical Examination: General appearance - alert, well appearing, and in no distress. Wt Readings from Last 3 Encounters:   12/08/21 150 lb (68 kg)   12/07/21 148 lb 8 oz (67.4 kg)   12/01/21 148 lb 9.6 oz (67.4 kg)         ASSESSMENT/PLAN:     Patient is tolerating treatments well with expected toxicities. RBA were reviewed prior to first fraction and PRN. Current and planned dose reviewed. Goals of treatment and potential side effects were reviewed with the patient PRN. Treatment imaging has been personally reviewed for accuracy and precision. Questions answered to apparent satisfaction. Treatments will continue as planned. Nell Bettencourt.  MD MS CASH CaseyR  Radiation Oncologist        [facility] ([address] [place] [address]): [phone] /// FAX: [phone]  [facility]): [phone] /// FAX: [phone]  [facility]): [phone] /// FAX: [phone]

## 2021-12-14 NOTE — PROGRESS NOTES
[address] MED ONCOLOGY  [facility] [address]  Dept: [phone]  Attending Progress Note      Reason for Visit: Non-small cell lung cancer. Referring Physician:  Sheree Fernandez MD    PCP:  Hank Olguin MD    History of Present Illness:      Mr. Jose L Ramirez is a very pleasant 69-year-old gentleman with a past medical history significant for A. 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, status post left upper lobectomy and thoracic lymphadenopathy on 10/29/2004, who was found to have on CT scan of the chest from 6/16/2021 a spiculated right suprahilar mass measuring 3.3 x 2.8 cm, a lobulated spiculated mass in the periphery of the right lower lobe measuring 3.1 x 2.6 cm, undefined; groundglass opacity in the left upper lobe measuring 1.3 x 1.1 cm, few other scattered tiny nodular densities, bilateral pleural effusions, the patient underwent on 7/1/2021 a CT-guided lung biopsy, pathology was consistent with scar tissue with focal epithelial atypia, patient underwent on 1/1/2021 bronchoscopy/EBUS by Dr. Jose Nunes, biopsies were taken from the right hilar mass, station [id number], biopsy of the hilar mass was positive for malignant cells, non-small cell carcinoma, compatible with non small cell carcinoma could not be determined from the specimen. The patient had a PET scan done on 9/20/2021, revealing metabolic activity associated with a nodular opacity in the medial right upper lobe, compatible with history of lung cancer.  This extends into the right hilar region.  The adjacent ground-glass opacity could be postobstructive changes or potentially related to mild neoplastic extension. Nodular opacity in the lateral right lower lobe is decreasing in size compared to the prior CT scan and has some associated metabolic activity.  The appearance as well as the interval improvement could be due to an infectious etiology including atypical infections.  While this could also be  partly related to malignancy, the interval improved appearance would be unusual for malignancy without treatment. Brain MRI from 1/23/2021 was negative for metastatic disease to the brain. The patient is accompanied by his spouse. No worsening shortness of breath. He was started on radiation therapy on 11/8/2021, chemotherapy was started on 11/9/2021, no nausea or vomiting, lower leg edema had improved. He has constipation, will start using Metamucil. Review of Systems;  CONSTITUTIONAL: No fever, chills. appetite and energy level. Pos for weight loss of 30 lbs. ENMT: Eyes: No diplopia; Nose: No epistaxis. Mouth: No sore throat. RESPIRATORY: No hemoptysis, positive for chronic shortness of breath, cough. CARDIOVASCULAR: No chest pain, palpitations. GASTROINTESTINAL: No nausea/vomiting, abdominal pain, positive for constipation. GENITOURINARY: No dysuria, urinary frequency, hematuria. NEURO: No syncope, presyncope, headache.   Remainder:  ROS NEGATIVE    Past Medical History:      Diagnosis Date    A-fib [facility])     AAA (abdominal aortic aneurysm) ([address]     measures 5.0 cm    Cancer ([address] 2005    left lung    CHF (congestive heart failure) ([address]     COPD (chronic obstructive pulmonary disease) (HCC)     Heart failure (HCC)     Hyperlipidemia     Hypertension      Patient Active Problem List   Diagnosis    COPD (chronic obstructive pulmonary disease) (HCC)    Hyperlipidemia LDL goal <100    Essential hypertension    Congestive heart failure (CHF) (HCC)    Lung mass    Atrial fibrillation (HCC)    Abdominal aortic aneurysm (AAA) without rupture (HCC)    Chronic systolic congestive heart failure (HCC)    Non-small cell cancer of right lung ([address]    Malignant neoplasm of lung ([address]        Past Surgical History:      Procedure Laterality Date    BRONCHOSCOPY N/A 9/1/2021    BRONCHOSCOPY W/EBUS FNA performed by Trey Martin MD at [address] [place] [address] N/A 2021    BRONCHOSCOPY ADD ON COMPUTER ASSISTED performed by Trey Martin MD at [address] [place] [address]  2021    BRONCHOSCOPY/TRANSBRONCHIAL NEEDLE BIOPSY performed by Trey Martin MD at [address] [place] [address]  2021    BRONCHOSCOPY ALVEOLAR LAVAGE performed by Trey Martin MD at [address] TEST N/A 2021    Lexiscan stress test    COLONOSCOPY      years ago    CT NEEDLE BIOPSY LUNG PERCUTANEOUS  2021    CT NEEDLE BIOPSY LUNG PERCUTANEOUS 2021 Bernard Barnes MD [facility] CT    LUNG CANCER SURGERY  2005       Family History:  Family History   Problem Relation Age of Onset    Cancer Sister         melanoma    Breast Cancer Sister     Heart Disease Brother        Medications:  Reviewed and reconciled. Social History:  Social History     Socioeconomic History    Marital status:      Spouse name: Not on file    Number of children: 0    Years of education: Not on file    Highest education level: Not on file   Occupational History    Not on file   Tobacco Use    Smoking status: Former Smoker     Packs/day: 1.00     Years: 10.00     Pack years: 10.00     Types: Cigarettes     Quit date: 2001     Years since quittin.4    Smokeless tobacco: Never Used   Vaping Use    Vaping Use: Never used   Substance and Sexual Activity    Alcohol use: Not Currently     Comment: no ETOH since     Drug use: Never    Sexual activity: Not on file   Other Topics Concern    Not on file   Social History Narrative    Not on file     Social Determinants of Health     Financial Resource Strain:     Difficulty of Paying Living Expenses: Not on file   Food Insecurity:     Worried About [address] in the Last Year: Not on file    [address] in the Last Year: Not on file   Transportation Needs:     Lack of Transportation (Medical):  Not on file    Lack of suprahilar mass measuring 3.3 x 2.8 cm, a lobulated spiculated mass in the periphery of the right lower lobe measuring 3.1 x 2.6 cm, undefined; groundglass opacity in the left upper lobe measuring 1.3 x 1.1 cm, few other scattered tiny nodular densities, bilateral pleural effusions, the patient underwent on 7/1/2021 a CT-guided lung biopsy, pathology was consistent with scar tissue with focal epithelial atypia, patient underwent on 1/1/2021 bronchoscopy/EBUS by Dr. Nora Ray, biopsies were taken from the right hilar mass, station [id number], biopsy of the hilar mass was positive for malignant cells, non-small cell carcinoma, compatible with non small cell carcinoma could not be determined from the specimen. The patient had a PET scan done on 9/20/2021, revealing metabolic activity associated with a nodular opacity in the medial right upper lobe, compatible with history of lung cancer.  This extends into the right hilar region.  The adjacent ground-glass opacity could be postobstructive changes or potentially related to mild neoplastic extension. Nodular opacity in the lateral right lower lobe is decreasing in size compared to the prior CT scan and has some associated metabolic activity. The appearance as well as the interval improvement could be due to an infectious etiology including atypical infections.  While this could also be  partly related to malignancy, the interval improved appearance would be unusual for malignancy without treatment. Brain MRI from 1/23/2021 was negative for metastatic disease to the brain.       The patient has a newly diagnosed non-small cell lung cancer, could not be determined whether it is adenocarcinoma or squamous cell carcinoma, pathology was sent to [facility] for a second opinion, the case was discussed with Dr. Atul Pizarro, diagnosis is non-small cell carcinoma, the diagnostic material is limited, some areas show cytoplasmic vacuolization favoring an adenocarcinoma, but there was insufficient

## 2021-12-16 NOTE — PROGRESS NOTES
DEPARTMENT OF RADIATION ONCOLOGY ON TREATMENT VISIT         12/16/2021      NAME:  Sonia Haywood    YOB: 1935    Diagnosis: lung cancer    SUBJECTIVE:   Sonia Haywood has now received fractionated external beam radiation therapy - ongoing. Past medical, surgical, social and family histories reviewed and updated as indicated. Pain: controlled    ALLERGIES:  Patient has no known allergies.          Current Outpatient Medications   Medication Sig Dispense Refill    lactulose (CEPHULAC) 10 g packet Take 10 g by mouth 2 times daily      docusate sodium (COLACE) 100 MG capsule Take 1 capsule by mouth 2 times daily as needed for Constipation 30 capsule 0    zinc oxide (DESITIN MAXIMUM STRENGTH) 40 % PSTE paste Apply a pea size amount to the anal area as needed for irritation for up to 10 days 57 g 0    apixaban (ELIQUIS) 5 MG TABS tablet Take 1 tablet by mouth 2 times daily 30 tablet 1    ondansetron (ZOFRAN-ODT) 4 MG disintegrating tablet Take 1 tablet by mouth every 8 hours as needed for Nausea or Vomiting 60 tablet 1    prochlorperazine (COMPAZINE) 10 MG tablet Take 1 tablet by mouth every 6 hours as needed (nausea) 60 tablet 1    isosorbide mononitrate (IMDUR) 30 MG extended release tablet Take 2 tablets by mouth daily 60 tablet 5    PROCTO-MED HC 2.5 % CREA rectal cream APPLY TO AFFECTED AREA TWICE A DAY MAXIMUM OF 2 WEEKS      dilTIAZem (CARDIZEM CD) 120 MG extended release capsule Take 1 capsule by mouth daily 30 capsule 3    bumetanide (BUMEX) 1 MG tablet Take 1 tablet by mouth daily 30 tablet 0    metoprolol succinate (TOPROL XL) 100 MG extended release tablet Take 1 tablet by mouth 2 times daily 60 tablet 3    albuterol-ipratropium (COMBIVENT RESPIMAT)  MCG/ACT AERS inhaler Inhale 2 puffs into the lungs every 6 hours as needed for Wheezing Uses bid, may use up to four times daily for sob      atorvastatin (LIPITOR) 10 MG tablet Take 10 mg by mouth every other day       Cholecalciferol (VITAMIN D3) 2000 UNITS CAPS Take 2,000 Units by mouth daily        No current facility-administered medications for this encounter. OBJECTIVE:  Alert and fully ambulatory. Pleasant and conversant. Physical Examination: General appearance - alert, well appearing, and in no distress. Wt Readings from Last 3 Encounters:   12/15/21 151 lb (68.5 kg)   12/14/21 149 lb 3.2 oz (67.7 kg)   12/08/21 150 lb (68 kg)         ASSESSMENT/PLAN:     Patient is tolerating treatments well with expected toxicities. RBA were reviewed prior to first fraction and PRN. Current and planned dose reviewed. Goals of treatment and potential side effects were reviewed with the patient PRN. Treatment imaging has been personally reviewed for accuracy and precision. Questions answered to apparent satisfaction. Treatments will continue as planned. Marcelyn Mohs.  Rigoberto Nettles MD MS DABR  Radiation Oncologist        [facility] ([address] [place] [address]): [phone] /// FAX: [phone]  [facility]): [phone] /// FAX: [phone]  [address] [place] [address]): [phone] /// FAX: [phone]

## 2021-12-16 NOTE — PATIENT INSTRUCTIONS
Continue daily fractionated radiation therapy as scheduled. Please see weekly OTV note and intial consultation letter in [facility] for clinical details. Forest Woodson. Georgia Borja MD MS [facility]:  [phone]   FAX: [phone]  [facility]:  [phone]   FAX:    [phone] [address]:  [phone]   FAX:  [phone]  Email: [email or web address]

## 2021-12-20 NOTE — PROGRESS NOTES
Kirillrajendra Gore  12/20/2021  10:09 AM          Current Outpatient Medications   Medication Sig Dispense Refill    lactulose (CEPHULAC) 10 g packet Take 10 g by mouth 2 times daily      docusate sodium (COLACE) 100 MG capsule Take 1 capsule by mouth 2 times daily as needed for Constipation 30 capsule 0    zinc oxide (DESITIN MAXIMUM STRENGTH) 40 % PSTE paste Apply a pea size amount to the anal area as needed for irritation for up to 10 days 57 g 0    apixaban (ELIQUIS) 5 MG TABS tablet Take 1 tablet by mouth 2 times daily 30 tablet 1    ondansetron (ZOFRAN-ODT) 4 MG disintegrating tablet Take 1 tablet by mouth every 8 hours as needed for Nausea or Vomiting 60 tablet 1    prochlorperazine (COMPAZINE) 10 MG tablet Take 1 tablet by mouth every 6 hours as needed (nausea) 60 tablet 1    isosorbide mononitrate (IMDUR) 30 MG extended release tablet Take 2 tablets by mouth daily 60 tablet 5    PROCTO-MED HC 2.5 % CREA rectal cream APPLY TO AFFECTED AREA TWICE A DAY MAXIMUM OF 2 WEEKS      dilTIAZem (CARDIZEM CD) 120 MG extended release capsule Take 1 capsule by mouth daily 30 capsule 3    bumetanide (BUMEX) 1 MG tablet Take 1 tablet by mouth daily 30 tablet 0    metoprolol succinate (TOPROL XL) 100 MG extended release tablet Take 1 tablet by mouth 2 times daily 60 tablet 3    albuterol-ipratropium (COMBIVENT RESPIMAT)  MCG/ACT AERS inhaler Inhale 2 puffs into the lungs every 6 hours as needed for Wheezing Uses bid, may use up to four times daily for sob      atorvastatin (LIPITOR) 10 MG tablet Take 10 mg by mouth every other day       Cholecalciferol (VITAMIN D3) 2000 UNITS CAPS Take 2,000 Units by mouth daily        No current facility-administered medications for this encounter. This is an up-to-date medication list.    Please take this list to your next care provider, and discard any previous medication lists.

## 2021-12-28 NOTE — PROGRESS NOTES
[address] [facility]  [address] [place] [id number]  Dept: [phone]  Attending Progress Note      Reason for Visit: Non-small cell lung cancer. Referring Physician:  Tha Beltran MD    PCP:  Wendy Rodas MD    History of Present Illness:      Mr. Rafael Rodriguez is a very pleasant 81-year-old gentleman with a past medical history significant for A. 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, status post left upper lobectomy and thoracic lymphadenopathy on 10/29/2004, who was found to have on CT scan of the chest from 6/16/2021 a spiculated right suprahilar mass measuring 3.3 x 2.8 cm, a lobulated spiculated mass in the periphery of the right lower lobe measuring 3.1 x 2.6 cm, undefined; groundglass opacity in the left upper lobe measuring 1.3 x 1.1 cm, few other scattered tiny nodular densities, bilateral pleural effusions, the patient underwent on 7/1/2021 a CT-guided lung biopsy, pathology was consistent with scar tissue with focal epithelial atypia, patient underwent on 1/1/2021 bronchoscopy/EBUS by Dr. Alondra Salinas, biopsies were taken from the right hilar mass, station [id number], biopsy of the hilar mass was positive for malignant cells, non-small cell carcinoma, compatible with non small cell carcinoma could not be determined from the specimen. The patient had a PET scan done on 9/20/2021, revealing metabolic activity associated with a nodular opacity in the medial right upper lobe, compatible with history of lung cancer.  This extends into the right hilar region.  The adjacent ground-glass opacity could be postobstructive changes or potentially related to mild neoplastic extension. Nodular opacity in the lateral right lower lobe is decreasing in size compared to the prior CT scan and has some associated metabolic activity.  The appearance as well as the interval improvement could be due to an infectious etiology including atypical infections.  While this could also be  partly related to malignancy, the interval improved appearance would be unusual for malignancy without treatment. Brain MRI from 1/23/2021 was negative for metastatic disease to the brain. The patient is accompanied by his spouse. No worsening shortness of breath. He was started on radiation therapy on 11/8/2021, chemotherapy was started on 11/9/2021, he completed chemotherapy on 12/14/2021, radiation therapy on 12/20/2021. He is doing well at this time, he has constipation. Review of Systems;  CONSTITUTIONAL: No fever, chills. appetite and energy level. Pos for weight loss of 30 lbs. ENMT: Eyes: No diplopia; Nose: No epistaxis. Mouth: No sore throat. RESPIRATORY: No hemoptysis, positive for chronic shortness of breath, cough. CARDIOVASCULAR: No chest pain, palpitations. GASTROINTESTINAL: No nausea/vomiting, abdominal pain, positive for constipation. GENITOURINARY: No dysuria, urinary frequency, hematuria. NEURO: No syncope, presyncope, headache.   Remainder:  ROS NEGATIVE    Past Medical History:      Diagnosis Date    A-fib [facility])     AAA (abdominal aortic aneurysm) ([address]     measures 5.0 cm    Cancer ([address] 2005    left lung    CHF (congestive heart failure) ([address]     COPD (chronic obstructive pulmonary disease) (HCC)     Heart failure (HCC)     Hyperlipidemia     Hypertension      Patient Active Problem List   Diagnosis    COPD (chronic obstructive pulmonary disease) (HCC)    Hyperlipidemia LDL goal <100    Essential hypertension    Congestive heart failure (CHF) (HCC)    Lung mass    Atrial fibrillation (HCC)    Abdominal aortic aneurysm (AAA) without rupture (HCC)    Chronic systolic congestive heart failure (HCC)    Non-small cell cancer of right lung ([address]    Malignant neoplasm of lung ([address]        Past Surgical History:      Procedure Laterality Date    BRONCHOSCOPY N/A 9/1/2021    BRONCHOSCOPY W/EBUS FNA performed by Debby Willingham MD at [facility] [place] N/A 2021    BRONCHOSCOPY ADD ON COMPUTER ASSISTED performed by Debby Willingham MD at [facility] [place]  2021    BRONCHOSCOPY/TRANSBRONCHIAL NEEDLE BIOPSY performed by Debby Willingham MD at [facility] [place]  2021    BRONCHOSCOPY ALVEOLAR LAVAGE performed by Debby Willingham MD at [address] TEST N/A 2021    Lexiscan stress test    COLONOSCOPY      years ago    CT NEEDLE BIOPSY LUNG PERCUTANEOUS  2021    CT NEEDLE BIOPSY LUNG PERCUTANEOUS 2021 Selena Baptiste MD [facility] CT    LUNG CANCER SURGERY  2005       Family History:  Family History   Problem Relation Age of Onset    Cancer Sister         melanoma    Breast Cancer Sister     Heart Disease Brother        Medications:  Reviewed and reconciled.     Social History:  Social History     Socioeconomic History    Marital status:      Spouse name: Not on file    Number of children: 0    Years of education: Not on file    Highest education level: Not on file   Occupational History    Not on file   Tobacco Use    Smoking status: Former Smoker     Packs/day: 1.00     Years: 10.00     Pack years: 10.00     Types: Cigarettes     Quit date: 2001     Years since quittin.5    Smokeless tobacco: Never Used   Vaping Use    Vaping Use: Never used   Substance and Sexual Activity    Alcohol use: Not Currently     Comment: no ETOH since     Drug use: Never    Sexual activity: Not on file   Other Topics Concern    Not on file   Social History Narrative    Not on file     Social Determinants of Health     Financial Resource Strain:     Difficulty of Paying Living Expenses: Not on file   Food Insecurity:     Worried About [address] in the Last Year: Not on file    [address] in the Last Year: Not on file   Transportation Needs:     Lack of Transportation (Medical): Not on file    Lack of Transportation (Non-Medical): Not on file   Physical Activity:     Days of Exercise per Week: Not on file    Minutes of Exercise per Session: Not on file   Stress:     Feeling of Stress : Not on file   Social Connections:     Frequency of Communication with Friends and Family: Not on file    Frequency of Social Gatherings with Friends and Family: Not on file    Attends [employer] Services: Not on file    Active Member of [address] [place] [address] or Organizations: Not on file    Attends Club or Organization Meetings: Not on file    Marital Status: Not on file   Intimate Partner Violence:     Fear of Current or Ex-Partner: Not on file    Emotionally Abused: Not on file    Physically Abused: Not on file    Sexually Abused: Not on file   Housing Stability:     Unable to Pay for Housing in the Last Year: Not on file    Number of [place] in the Last Year: Not on file    Unstable Housing in the Last Year: Not on file       Allergies:  No Known Allergies    Physical Exam:  /71 (Site: Left Upper Arm, Position: Sitting, Cuff Size: Medium Adult)   Pulse 111   Temp 97 °F (36.1 °C) (Infrared)   Resp 16   Ht 5' 9\" (1.753 m)   Wt 150 lb (68 kg)   SpO2 96%   BMI 22.15 kg/m²   GENERAL: Alert, oriented x 3, not in acute distress. HEENT: PERRLA; EOMI. Oropharynx clear. NECK: Supple. No palpable cervical or supraclavicular lymphadenopathy. LUNGS: Decreased air entry bilaterally. CARDIOVASCULAR: Regular rhythm. ABDOMEN: Soft. Non-tender, non-distended. Positive bowel sounds. EXTREMITIES: Mild bilateral lower leg edema left greater than right. NEUROLOGIC: No focal deficits.    ECOG PS 1       Impression/Plan:       Mr. Renetta Nunez is a very pleasant 73-year-old gentleman with a past medical history significant for A. 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, status post left upper lobectomy and thoracic lymphadenopathy on 10/29/2004, who was found to have on CT scan of the chest from 6/16/2021 a spiculated right suprahilar mass measuring 3.3 x 2.8 cm, a lobulated spiculated mass in the periphery of the right lower lobe measuring 3.1 x 2.6 cm, undefined; groundglass opacity in the left upper lobe measuring 1.3 x 1.1 cm, few other scattered tiny nodular densities, bilateral pleural effusions, the patient underwent on 7/1/2021 a CT-guided lung biopsy, pathology was consistent with scar tissue with focal epithelial atypia, patient underwent on 1/1/2021 bronchoscopy/EBUS by Dr. Gopal Alberts, biopsies were taken from the right hilar mass, station [id number], biopsy of the hilar mass was positive for malignant cells, non-small cell carcinoma, compatible with non small cell carcinoma could not be determined from the specimen. The patient had a PET scan done on 9/20/2021, revealing metabolic activity associated with a nodular opacity in the medial right upper lobe, compatible with history of lung cancer.  This extends into the right hilar region.  The adjacent ground-glass opacity could be postobstructive changes or potentially related to mild neoplastic extension. Nodular opacity in the lateral right lower lobe is decreasing in size compared to the prior CT scan and has some associated metabolic activity. The appearance as well as the interval improvement could be due to an infectious etiology including atypical infections.  While this could also be  partly related to malignancy, the interval improved appearance would be unusual for malignancy without treatment. Brain MRI from 1/23/2021 was negative for metastatic disease to the brain.       The patient has a newly diagnosed non-small cell lung cancer, could not be determined whether it is adenocarcinoma or squamous cell carcinoma, pathology was sent to [facility] for a second opinion, the case was discussed with Dr. Bc Mcclure, diagnosis is non-small cell carcinoma, the diagnostic material is limited, some areas show cytoplasmic vacuolization favoring an adenocarcinoma, but there was insufficient material for IHC stains to confirm. He has clinical stage T2N1M0 (nodular opacity in the right lower lobe is decreasing in size, this is likely of benign etiology, will follow for now, if malignant SBRT can be done). The PET scan images were reviewed with the patient and his spouse. I discussed with them his diagnosis, prognosis and recommendations for treatment. I offered the patient evaluation by thoracic surgery, the patient is not interested in any surgical interventions, which is very reasonable at his age and due to his comorbidities. Concurrent chemoradiation is recommended, adenocarcinoma could not be confirmed, therefore we will treat him with weekly carboplatin and Taxol, the side effects of the treatment were reviewed with the patient, he will have a chemo teach, adjuvant immunotherapy, durvalumab after completion of the chemo RT is recommended. The patient was started on radiation therapy on11/8/2021, was started on weekly carboplatin and Taxol on 11/9/2021, the patient had myelosuppression, carboplatin was discontinued, was continued on single agent Taxol, he completed chemotherapy on 12/14/2021, radiation therapy on 12/20/2021. The patient is doing well at this time, CT scans of the chest, abdomen, pelvis and a bone scan were ordered, compression, the patient will be started on adjuvant immunotherapy, durvalumab, the side effects were reviewed with the patient. Lower leg edema, Venous US pos for  deep venous thrombosis in the left peroneal veins, probably secondary to malignancy, not Eliquis failure, he has CKD, increase the dose of Eliquis dose to 5 mg po bid, cardiology team was notified. RTC in 3 weeks. Thank you for allowing us to participate in the care of Mr. Augustin Nicolas.     Elyssa Otero MD   HEMATOLOGY/MEDICAL [address] [facility], [address]  [address] [place]  MED ONCOLOGY  [phone] [phone]  Dept: [phone]

## 2021-12-29 PROBLEM — S32.010D COMPRESSION FRACTURE OF FIRST LUMBAR VERTEBRA WITH ROUTINE HEALING: Status: ACTIVE | Noted: 2021-01-01

## 2021-12-29 NOTE — ED NOTES
Department of Emergency Medicine    FIRST PROVIDER TRIAGE NOTE             Independent MLP           12/29/21  4:26 PM EST    Date of Encounter: 12/29/21   MRN: [id number]    Vitals:    12/29/21 1213   BP: 120/84   Pulse: 90   Temp: 98.2 °F (36.8 °C)   SpO2: 95%   Weight: 150 lb (68 kg)      HPI: Nayana Ambrocio is a 81 y.o. male who presents to the ED for Fatigue (generalized weakness, finshed chemo 1 wk ago) and Shortness of Breath (with exertion)     ROS: Negative for abd pain. Physical Exam:   Gen Appearance/Constitutional: alert     Initial Plan of Care: All treatment areas with department are currently occupied. Plan to order/Initiate the following while awaiting opening in ED: labs, EKG and imaging studies.     Initial Plan of Care: Initiate Treatment-Testing, Proceed toTreatment Area When Bed Available for ED Attending/MLP to Continue Care    Electronically signed by Lorenzo Rose PA-C   DD: 12/29/21       Lorenzo Rose PA-C  12/29/21 [id number]

## 2021-12-29 NOTE — LETTER
[place] Department Medicaid  CERTIFICATION OF NECESSITY  FOR NON-EMERGENCY TRANSPORTATION   BY [facility]      Individual Information   1. Name: Pushpa Ruiz 2. [place] Medicaid Billing Number:    3. Address: [place] [address] [address] [facility]      Transportation Provider Information   4. Provider Name: [facility]   5. [place] Medicaid Provider Number:  National Provider Identifier (NPI):      Certification  7. Criteria:  During transport, this individual requires:  [x] Medical treatment or continuous     supervision by an EMT. [x] The administration or regulation of oxygen by another person. [] Supervised protective restraint. 8. Period Beginning Date: 1/4/21   9. Length  [x] Not more than 5 day(s)  [] One Year     Additional Information Relevant to Certification   10. Comments or Explanations, If Necessary or Appropriate     TLSO, spinal neutral, L1 compression fx     Certifying Practitioner Information   11. Name of Practitioner: Dr Michele Cifuentes MD   15. [place] Medicaid Provider Number, If Applicable:  [address] Provider Identifier (NPI):      Signature Information   14. Date of Signature: 1/4/21 15. Name of Person Signing: Ronald Singer    12.  Signature and Professional Designation: Electronically signed by CHUY Medel on 1/4/2022 at 2:26 PM       ODM 43200  Rev. 7/2015

## 2021-12-30 PROBLEM — D72.9 ABNORMAL WBC COUNT: Chronic | Status: ACTIVE | Noted: 2021-01-01

## 2021-12-30 NOTE — H&P
Admission History and Physical                                                                                    Yaw Ramirez MD, FACP                   Patient Name: Elan Bland                   Age:  81 y.o. Gender:   male    CC: Back pain    HPI: History obtained from multiple sources, chart review patient interview. This 71-year-old male with a history of lung cancer and chemotherapy coupled with radiation therapy presents with complaint of low back pain. There is no specific trauma associated with this however he stated he was bending down when he had the onset of pain about a week ago. He actually denies shortness of breath but is significantly dyspneic on speech.     Initial emergency room evaluation:  BUN 35 creatinine 1.9  High-sensitivity troponin was elevated but delta troponin was negative  INR 2.2    Lumbar spine CT:  New compression fracture L1 vertebral body  Moderate spinal stenosis  Increasing abdominal aortic aneurysm to 5.4 cm    Chest x-ray post left thoracotomy  Right suprahilar mass unchanged  Worsening appearance of metastatic lung disease  Vitals appear to be normal  Patient is on 2 L nasal cannula saturating at 99% although dyspneic on speech        Past Medical History:   Diagnosis Date    A-fib [facility])     AAA (abdominal aortic aneurysm) ([facility] [address] [address]     measures 5.0 cm    Cancer ([facility] [address] 2005    left lung    CHF (congestive heart failure) (HCC)     COPD (chronic obstructive pulmonary disease) (HCC)     Heart failure (HCC)     Hyperlipidemia     Hypertension        Past Surgical History:   Procedure Laterality Date    BRONCHOSCOPY N/A 9/1/2021    BRONCHOSCOPY W/EBUS FNA performed by Alana Mckeon MD at [address] [place] [address] N/A 9/1/2021    BRONCHOSCOPY ADD ON COMPUTER ASSISTED performed by Alana Mkceon MD at [address] [place] [address]  9/1/2021    BRONCHOSCOPY/TRANSBRONCHIAL NEEDLE BIOPSY performed by June Henry MD at [address]  2021    BRONCHOSCOPY ALVEOLAR LAVAGE performed by June Henry MD at [address] TEST N/A 2021    Lexiscan stress test    COLONOSCOPY      years ago    CT NEEDLE BIOPSY LUNG PERCUTANEOUS  2021    CT NEEDLE BIOPSY LUNG PERCUTANEOUS 2021 Jerrica Jacques MD [facility] CT    LUNG CANCER SURGERY  2005       Family Status   Relation Name Status    Mother     Boyce Father     Boyce Sister      Brother          Prior to Admission medications    Medication Sig Start Date End Date Taking?  Authorizing Provider   lactulose (CEPHULAC) 10 g packet Take 10 g by mouth 2 times daily   Yes Historical Provider, MD   docusate sodium (COLACE) 100 MG capsule Take 1 capsule by mouth 2 times daily as needed for Constipation 21  Yes GRACIELA Mistry CNP   zinc oxide (DESITIN MAXIMUM STRENGTH) 40 % PSTE paste Apply a pea size amount to the anal area as needed for irritation for up to 10 days 21  Yes GRACIELA Rodriguez CNP   ondansetron (ZOFRAN-ODT) 4 MG disintegrating tablet Take 1 tablet by mouth every 8 hours as needed for Nausea or Vomiting 21  Yes Yuridia Mcfadden MD   prochlorperazine (COMPAZINE) 10 MG tablet Take 1 tablet by mouth every 6 hours as needed (nausea) 21  Yes Yuridia Mcfadden MD   isosorbide mononitrate (IMDUR) 30 MG extended release tablet Take 2 tablets by mouth daily 10/22/21  Yes Amadeo Bell MD   PROCTO-MED HC 2.5 % CREA rectal cream APPLY TO AFFECTED AREA TWICE A DAY MAXIMUM OF 2 WEEKS 21  Yes Historical Provider, MD   dilTIAZem (CARDIZEM CD) 120 MG extended release capsule Take 1 capsule by mouth daily 21  Yes Maciej Renee MD   bumetanide (BUMEX) 1 MG tablet Take 1 tablet by mouth daily 21  Yes Cheli Clinton MD   metoprolol succinate (TOPROL XL) 100 MG extended release tablet Take 1 tablet by mouth 2 times daily 21  Yes Lanette Hauser MD   albuterol-ipratropium (COMBIVENT RESPIMAT)  MCG/ACT AERS inhaler Inhale 2 puffs into the lungs every 6 hours as needed for Wheezing Uses bid, may use up to four times daily for sob   Yes Historical Provider, MD   atorvastatin (LIPITOR) 10 MG tablet Take 10 mg by mouth every other day    Yes Historical Provider, MD   Cholecalciferol (VITAMIN D3) 2000 UNITS CAPS Take 2,000 Units by mouth daily    Yes Historical Provider, MD   apixaban (ELIQUIS) 5 MG TABS tablet Take 1 tablet by mouth 2 times daily 21   Jeramie Bell MD        Social History     Socioeconomic History    Marital status:      Spouse name: None    Number of children: 0    Years of education: None    Highest education level: None   Occupational History    None   Tobacco Use    Smoking status: Former Smoker     Packs/day: 1.00     Years: 10.00     Pack years: 10.00     Types: Cigarettes     Quit date: 2001     Years since quittin.5    Smokeless tobacco: Never Used   Vaping Use    Vaping Use: Never used   Substance and Sexual Activity    Alcohol use: Not Currently     Comment: no ETOH since     Drug use: Never    Sexual activity: None   Other Topics Concern    None   Social History Narrative    None     Social Determinants of Health     Financial Resource Strain:     Difficulty of Paying Living Expenses: Not on file   Food Insecurity:     Worried About Running Out of Food in the Last Year: Not on file    Sarah of Food in the Last Year: Not on file   Transportation Needs:     Lack of Transportation (Medical): Not on file    Lack of Transportation (Non-Medical):  Not on file   Physical Activity:     Days of Exercise per Week: Not on file    Minutes of Exercise per Session: Not on file   Stress:     Feeling of Stress : Not on file   Social Connections:     Frequency of Communication with Friends and Family: Not on file    Frequency of Social Gatherings with Friends and Family: Not on file    Attends Evangelical Services: Not on file    Active Member of Clubs or Organizations: Not on file    Attends Club or Organization Meetings: Not on file    Marital Status: Not on file   Intimate Partner Violence:     Fear of Current or Ex-Partner: Not on file    Emotionally Abused: Not on file    Physically Abused: Not on file    Sexually Abused: Not on file   Housing Stability:     Unable to Pay for Housing in the Last Year: Not on file    Number of [place] in the Last Year: Not on file    Unstable Housing in the Last Year: Not on file       No Known Allergies    The patient's medical records have been reviewed contingent on availability    Review of Systems:   · General: Some malaise and weakness noted denies fever or chills. · Eyes: No visual changes or diplopia. No swelling or pain. · ENT: No Headaches, tinnitus or vertigo. No mouth sores or sore throat. · Cardiovascular: No chest pain, dyspnea on exertion, palpitations, syncope. · Respiratory: No cough or wheezing, hemoptysis, sob, pleuritic pain. Dyspnea on exertion  · Gastrointestinal: No abdominal pain, anorexia, hematochezia, melena, hematemesis or change in bowels. · Genitourinary: No dysuria, trouble voiding, or hematuria. No change in urination. · Musculoskeletal: Low back pain  · Integumentary: No rash or pruritis. No abnormal pigmentation,  masses, hair or nail changes  · Neurological: No unusual headaches, weakness, paresthesias. No change in gait, balance, coordination, memory, mentation or behavior. · Psychiatric: No anxiety, or depression. Mood and affect reported as normal  · Endocrine: No temperature intolerance. No polydipsia or polyuria. · Hematologic: No abnormal bruising or bleeding, blood clots or swollen lymph nodes. no anemia, no fever,chills, night sweats, swollen glands. · Allergic/Immunologic: No nasal congestion or hives.       Physical Examination: Wt Readings from Last 3 Encounters:   12/30/21 150 lb (68 kg)   12/28/21 150 lb (68 kg)   12/15/21 151 lb (68.5 kg)     Temp Readings from Last 3 Encounters:   12/30/21 97 °F (36.1 °C) (Temporal)   12/28/21 97 °F (36.1 °C) (Infrared)   12/15/21 97.1 °F (36.2 °C) (Skin)     BP Readings from Last 3 Encounters:   12/30/21 (!) 145/95   12/28/21 119/71   12/15/21 (!) 148/68     Pulse Readings from Last 3 Encounters:   12/30/21 84   12/28/21 111   12/15/21 87       General appearance: Frail appearing awake, alert no distress. Dyspneic on speech  Skin: Color, texture, turgor normal. No rashes or lesions. Head: Normocephalic. No masses, lesions, tenderness or abnormalities   Face: Symmetric no visible lesions  Eyes: Conjunctivae/cornea clear. Genevia Boning. Sclera non icteric. Ears: External appearance normal.  Hearing grossly normal  Nose/Sinuses: Nares normal. No paranasal sinus tenderness. Mouth: Lips and tongue appear normal. Dentition noted  Neck:  Symmetric. No adenopathy. Thyroid symmetric, normal size, without nodules. Trachea is midline. Chest: Even excursion reduced ventilation  Lungs: Harsh breath sounds bilaterally, no evidence of bronchospasm, no evidence of focal consolidation on examination --- current oxygen saturation on 2 L measured at bedside 91%  Heart: S1 > S2. Rhythm is regular and rate is normal. No gallop rub or murmur. Abdomen: Soft, mildly protuberant, non-tender. BS normal. No masses, organomegaly. Anatomic contours appear normal.  Extremities: No deformities, edema, or skin discoloration. Peripheral perfusion assessed in all exremities. No cyanosis  Musculoskeletal: As noted above low back pain reproducible by percussion  Neuro:   · Cranial nerves grossly intact. · Motor: Strength grossly normal. No focal weakness. · Sensory: grossly normal to touch. · Cerebellar testing was not performed  Mental status: Awake, alert, cognizant of person, place, time.     Patient appears capable of directing self care   Mood: Normal and appropriate affect  Gait & balance: not assessed: Assisted     Labs     CBC:   Lab Results   Component Value Date    WBC 9.8 12/30/2021    RBC 3.84 12/30/2021    HGB 12.1 12/30/2021    HCT 36.2 12/30/2021     12/30/2021    MCV 94.3 12/30/2021     BMP:    Lab Results   Component Value Date     12/30/2021    K 4.6 12/30/2021     12/30/2021    CO2 25 12/30/2021    BUN 51 12/30/2021    CREATININE 1.8 12/30/2021    GLUCOSE 98 12/30/2021    CALCIUM 8.9 12/30/2021     Hepatic Function Panel:    Lab Results   Component Value Date    ALKPHOS 88 12/30/2021    AST 21 12/30/2021    ALT 21 12/30/2021    PROT 5.1 12/30/2021    LABALBU 2.9 12/30/2021    BILITOT 1.3 12/30/2021     Magnesium:    Lab Results   Component Value Date    MG 2.1 11/08/2021     Cardiac Enzymes:   Lab Results   Component Value Date    CKTOTAL 70 03/18/2016    CKMB 2.7 03/18/2016    TROPONINI <0.01 03/18/2016     LDH:  No results found for: LDH  PT/INR:    Lab Results   Component Value Date    PROTIME 24.2 12/30/2021    INR 2.2 12/30/2021     BNP: No results for input(s): BNP in the last 72 hours. TSH:   Lab Results   Component Value Date    TSH 3.130 06/16/2021      Cardiac Injury Profile: No results for input(s): CKTOTAL, CKMB, CKMBINDEX, TROPONINI in the last 72 hours.    Lipid Profile:   Lab Results   Component Value Date    TRIG 68 06/25/2021    HDL 38 06/25/2021    LDLCALC 43 06/25/2021    CHOL 95 06/25/2021      Hemoglobin A1C: No components found for: HGBA1C   U/A:   Lab Results   Component Value Date    LEUKOCYTESUR Negative 12/29/2021    PHUR 5.5 12/29/2021    WBCUA 0-1 06/15/2021    RBCUA 0-1 06/15/2021    BACTERIA RARE 06/15/2021    SPECGRAV 1.020 12/29/2021    BLOODU Negative 12/29/2021    GLUCOSEU Negative 12/29/2021         ADMISSION SCHEDULED MEDS:   Current Facility-Administered Medications   Medication Dose Route Frequency Provider Last Rate Last Admin    ipratropium-albuterol (DUONEB) nebulizer solution 1 ampule  1 ampule Nebulization Q6H PRN GRACIELA Sanchez - CNP        [Held by provider] apixaban (ELIQUIS) tablet 5 mg  5 mg Oral BID Lópezkristin Infante, APRN - CNP        atorvastatin (LIPITOR) tablet 10 mg  10 mg Oral Every Other Day LópezGRACIELA Gibson - CNP   10 mg at 12/30/21 0835    bumetanide (BUMEX) tablet 1 mg  1 mg Oral Daily López , APRN - CNP   1 mg at 12/30/21 0836    dilTIAZem (CARDIZEM CD) extended release capsule 120 mg  120 mg Oral Daily López , GRACIELA - CNP   120 mg at 12/30/21 0836    isosorbide mononitrate (IMDUR) extended release tablet 60 mg  60 mg Oral Daily López Naresh, GRACIELA - CNP   60 mg at 12/30/21 0836    lactulose (CHRONULAC) 10 GM/15ML solution 10 g  10 g Oral BID López Naresh APRN - CNP        metoprolol succinate (TOPROL XL) extended release tablet 100 mg  100 mg Oral BID López Naresh, APRN - CNP   100 mg at 12/30/21 0836    sodium chloride flush 0.9 % injection 5-40 mL  5-40 mL IntraVENous 2 times per day Lópezkristin Infante, APRN - CNP        sodium chloride flush 0.9 % injection 5-40 mL  5-40 mL IntraVENous PRN López Naresh, APRN - CNP        0.9 % sodium chloride infusion  25 mL IntraVENous PRN Lópzekristin Infante APRN - CNP        ondansetron (ZOFRAN-ODT) disintegrating tablet 4 mg  4 mg Oral Q8H PRN GRACIELA Sanchez - CNP        Or    ondansetron (ZOFRAN) injection 4 mg  4 mg IntraVENous Q6H PRN Lópezjesi Infante, APRN - CNP        acetaminophen (TYLENOL) tablet 650 mg  650 mg Oral Q6H PRN López Infante, GRACIELA - CNP        Or    acetaminophen (TYLENOL) suppository 650 mg  650 mg Rectal Q6H PRN López Infante, APRN - CNP        potassium chloride (KLOR-CON M) extended release tablet 40 mEq  40 mEq Oral PRN López , APRN - CNP        Or    potassium bicarb-citric acid (EFFER-K) effervescent tablet 40 mEq  40 mEq Oral PRN López , APRN - CNP        Or    potassium chloride 10 mEq/100 mL IVPB (Peripheral Line)  10 mEq IntraVENous PRN Urban Sandeep, APRN - CNP        senna (SENOKOT) tablet 8.6 mg  1 tablet Oral Daily PRN Urban Sandeep, APRN - CNP        oxyCODONE (ROXICODONE) immediate release tablet 5 mg  5 mg Oral Q6H PRN Urban Sandeep, APRN - CNP   5 mg at 12/30/21 6889       Current  Infusions   sodium chloride         Prn Meds  ipratropium-albuterol, sodium chloride flush, sodium chloride, ondansetron **OR** ondansetron, acetaminophen **OR** acetaminophen, potassium chloride **OR** potassium alternative oral replacement **OR** potassium chloride, senna, oxyCODONE    Radiology Review:  CT LUMBAR SPINE WO CONTRAST   Final Result   1. New acute appearing compression fracture of the L1 vertebral body   2. Degenerative spondylolisthesis at L2-L3 causing moderate spinal stenosis   3. Degenerative disc disease at L4-L5, stable   4. Increased size of the abdominal aorta aneurysm, measuring      RECOMMENDATIONS:   Fusiform AAA Size:    Follow-up Recommendation1:      2.6-2.9 cm     Every 5 years 2      3.0-3.4 cm       Every 3 years      3.5-3.9 cm      Every 2 years      4.0-4.4 cm     Every 12 months,  recommend vascular consultation      4.5-5.4 cm     Every 6 months,  recommend vascular consultation      >5.5 cm        Referral to vascular specialist   ______________________________________________________________________________   _______ 1 Based on ACR White paper: IJ am Laura Radioli 2013;10:789-794.  2   For aortas of maximum diameter 2.6--2.9 cm meeting the criteria for AAA (>1.5   x proximal normal segment; no f/u if < 1.5 x proximal normal segment; no f/u   for aortas < 2.6 cm)      Note:  AAA enlargement of >0.5 cm in 6 months or >1 cm in 1 year, recommend   vascular consultation Note: Saccular AAA of any size, recommend vascular   consultation         XR CHEST (2 VW)   Final Result   Left post thoracotomy change with ipsilateral volume loss.   Right suprahilar   known mass, similar to prior. Separately, both lungs show worsening   opacities which may reflect worsening metastatic lung disease.          CT CHEST WO CONTRAST    (Results Pending)         ASSESSMENT:  Active diagnoses treated at this admission:  · Acute L1 fracture  · Low back pain  · Metastatic lung cancer: Recent chemotherapy and radiation therapy completed  · History of COPD  · History of chronic congestive heart failure no evidence of current decompensation  · Atrial fibrillation currently rate controlled telemetry and ECG reviewed    Problem list:  Patient Active Problem List   Diagnosis    COPD (chronic obstructive pulmonary disease) (HCC)    Hyperlipidemia LDL goal <100    Essential hypertension    Congestive heart failure (CHF) (HCC)    Lung mass    Atrial fibrillation ([address]    Abdominal aortic aneurysm (AAA) without rupture (HCC)    Chronic systolic congestive heart failure (HCC)    Non-small cell cancer of right lung (HCC)    Malignant neoplasm of lung (HCC)    Compression fracture of first lumbar vertebra with routine healing    Abnormal WBC count - Low blast count noted       PLAN:  Orders as entered by nocturnist reviewed  Anticoagulation held in anticipation of intervention  Continue diuretic therapy  Pain control is adequate  Consultation to neurosurgery  Will require DVT prophylaxis  We will order straight aerosol treatments  Administer single dose of steroid in light of his history  In light of his current presentation consider further diuresis elevated BNP may be associated with lung findings  Monitor electrolytes closely monitor hydration closely    See  Orders  Sarah Duarte MD, Danielle [facility]  8:49 AM  12/30/2021

## 2021-12-30 NOTE — PROGRESS NOTES
Date:2021  Patient Name: Elan Bland  MRN: [id number]  : 1935  ROOM #: [id number]    Occupational Therapy order received, chart reviewed and evaluation attempted this date. Patient is unavailable for OT evaluation at this time due to awaiting neurosurgery recommendations/POC. Will continue to follow-up. Thank you.      Joel Beatty OTR/L #[id number]

## 2021-12-30 NOTE — ED PROVIDER NOTES
Department of Emergency Medicine   ED  Provider Note  Admit Date/RoomTime: 12/29/2021  7:01 PM  ED Room: [facility]          History of Present Illness:  12/29/21, Time: 8:43 PM EST  Chief Complaint   Patient presents with    Fatigue     generalized weakness, finshed chemo 1 wk ago    Shortness of Breath     with exertion                Mandeep Collado is a 81 y.o. male presenting to the ED for back pain. Patient initial triage note states he was short of breath with exertion. He states this is chronic for him, he does have lung cancer, he just finished chemo and radiation about 2 weeks ago. He is actually here for back pain, low back pain, began after he bent over a week ago, worse when he twists or sits a certain way, does not radiate anywhere. Denies any change in bowel or bladder, has not had this in the past.  Denies any direct injury or trauma. Denies any weakness or numbness in the legs. He states he is having trouble ambulating due to the pain. Denies fever, chills, nausea, vomiting, change in bowel or bladder, lethargy, paresthesias, or any other symptoms or complaints. Review of Systems:   Pertinent positives and negatives are stated within HPI, all other systems reviewed and are negative.        --------------------------------------------- PAST HISTORY ---------------------------------------------  Past Medical History:  has a past medical history of A-fib ([facility] AAA (abdominal aortic aneurysm) ([facility] Cancer ([facility] CHF (congestive heart failure) ([facility] COPD (chronic obstructive pulmonary disease) ([facility] Heart failure ([facility] Hyperlipidemia, and Hypertension. Past Surgical History:  has a past surgical history that includes Lung cancer surgery (01/01/2005); cardiovascular stress test (N/A, 06/16/2021); CT NEEDLE BIOPSY LUNG PERCUTANEOUS (7/1/2021); bronchoscopy (N/A, 9/1/2021); bronchoscopy (N/A, 9/1/2021); bronchoscopy (9/1/2021); bronchoscopy (9/1/2021); and Colonoscopy.     Social History: reports that he quit smoking about 20 years ago. His smoking use included cigarettes. He has a 10.00 pack-year smoking history. He has never used smokeless tobacco. He reports previous alcohol use. He reports that he does not use drugs. Family History: family history includes Breast Cancer in his sister; Cancer in his sister; Heart Disease in his brother. . Unless otherwise noted, family history is non contributory    The patients home medications have been reviewed. Allergies: Patient has no known allergies. ---------------------------------------------------PHYSICAL EXAM--------------------------------------    Constitutional/General: Alert and oriented x3  Head: Normocephalic and atraumatic  Eyes: PERRL, EOMI, sclera non icteric  Mouth: Oropharynx clear, handling secretions, no trismus, no asymmetry of the posterior oropharynx or uvular edema  Neck: Supple, full ROM, no stridor, no meningeal signs  Respiratory: Lungs clear to auscultation bilaterally, no wheezes, rales, or rhonchi. Not in respiratory distress  Cardiovascular:  Regular rate. Regular rhythm. 2+ distal pulses. Equal extremity pulses. Chest: No chest wall tenderness  GI:  Abdomen Soft, Non tender, Non distended. No rebound, guarding, or rigidity. No pulsatile masses. Back: Tenderness of the lumbar spine, no gross signs of abnormality  Musculoskeletal: Moves all extremities x 4. Warm and well perfused, no clubbing, cyanosis, or edema. Capillary refill <3 seconds  Integument: skin warm and dry. No rashes. Neurologic: GCS 15, no focal deficits, symmetric strength 5/5 in the upper and lower extremities bilaterally  Psychiatric: Normal Affect          -------------------------------------------------- RESULTS -------------------------------------------------  I have personally reviewed all laboratory and imaging results for this patient. Results are listed below.      LABS: (Lab results interpreted by me)  Results for orders placed or performed during the hospital encounter of 12/29/21   COVID-19, Rapid    Specimen: Nasopharyngeal Swab   Result Value Ref Range    SARS-CoV-2, NAAT Not Detected Not Detected   CBC Auto Differential   Result Value Ref Range    WBC 11.1 4.5 - 11.5 E9/L    RBC 3.94 3.80 - 5.80 E12/L    Hemoglobin 12.5 12.5 - 16.5 g/dL    Hematocrit 38.0 37.0 - 54.0 %    MCV 96.4 80.0 - 99.9 fL    MCH 31.7 26.0 - 35.0 pg    MCHC 32.9 32.0 - 34.5 %    RDW 18.7 (H) 11.5 - 15.0 fL    Platelets 427 356 - 579 E9/L    MPV 10.7 7.0 - 12.0 fL    Neutrophils % 71.1 43.0 - 80.0 %    Lymphocytes % 2.6 (L) 20.0 - 42.0 %    Monocytes % 13.2 (H) 2.0 - 12.0 %    Eosinophils % 0.0 0.0 - 6.0 %    Basophils % 1.6 0.0 - 2.0 %    Neutrophils Absolute 8.88 (H) 1.80 - 7.30 E9/L    Lymphocytes Absolute 0.67 (L) 1.50 - 4.00 E9/L    Monocytes Absolute 1.44 (H) 0.10 - 0.95 E9/L    Eosinophils Absolute 0.00 (L) 0.05 - 0.50 E9/L    Basophils Absolute 0.00 0.00 - 0.20 E9/L    Atypical Lymphocytes Relative 3.5 0.0 - 4.0 %    Metamyelocytes Relative 6.1 (H) 0.0 - 1.0 %    Myelocyte Percent 2.6 0 - 0 %    Blasts Relative 0.9 0 - 0 %    nRBC 2.6 /100 WBC    Anisocytosis 2+     Polychromasia 1+     Poikilocytes 1+     Ovalocytes 2+     Tear Drop Cells 1+    Comprehensive Metabolic Panel w/ Reflex to MG   Result Value Ref Range    Sodium 137 132 - 146 mmol/L    Potassium reflex Magnesium 5.0 3.5 - 5.0 mmol/L    Chloride 97 (L) 98 - 107 mmol/L    CO2 27 22 - 29 mmol/L    Anion Gap 13 7 - 16 mmol/L    Glucose 103 (H) 74 - 99 mg/dL    BUN 43 (H) 6 - 23 mg/dL    CREATININE 1.8 (H) 0.7 - 1.2 mg/dL    GFR Non-African American 36 >=60 mL/min/1.73    GFR African American 44     Calcium 9.1 8.6 - 10.2 mg/dL    Total Protein 6.0 (L) 6.4 - 8.3 g/dL    Albumin 3.4 (L) 3.5 - 5.2 g/dL    Total Bilirubin 1.6 (H) 0.0 - 1.2 mg/dL    Alkaline Phosphatase 97 40 - 129 U/L    ALT 20 0 - 40 U/L    AST 18 0 - 39 U/L   Troponin   Result Value Ref Range    Troponin, High Sensitivity 32 (H) 0 - 11 ng/L   Brain Natriuretic Peptide   Result Value Ref Range    Pro-BNP 20,130 (H) 0 - 450 pg/mL   Urinalysis   Result Value Ref Range    Color, UA Yellow Straw/Yellow    Clarity, UA Clear Clear    Glucose, Ur Negative Negative mg/dL    Bilirubin Urine Negative Negative    Ketones, Urine Negative Negative mg/dL    Specific Gravity, UA 1.020 1.005 - 1.030    Blood, Urine Negative Negative    pH, UA 5.5 5.0 - 9.0    Protein, UA Negative Negative mg/dL    Urobilinogen, Urine 0.2 <2.0 E.U./dL    Nitrite, Urine Negative Negative    Leukocyte Esterase, Urine Negative Negative   Troponin   Result Value Ref Range    Troponin, High Sensitivity 30 (H) 0 - 11 ng/L   EKG 12 Lead   Result Value Ref Range    Ventricular Rate 90 BPM    Atrial Rate 150 BPM    QRS Duration 94 ms    Q-T Interval 370 ms    QTc Calculation (Bazett) 452 ms    R Axis 20 degrees    T Axis -120 degrees   ,       RADIOLOGY:  Interpreted by Radiologist unless otherwise specified  CT LUMBAR SPINE WO CONTRAST   Final Result   1. New acute appearing compression fracture of the L1 vertebral body   2. Degenerative spondylolisthesis at L2-L3 causing moderate spinal stenosis   3. Degenerative disc disease at L4-L5, stable   4.  Increased size of the abdominal aorta aneurysm, measuring      RECOMMENDATIONS:   Fusiform AAA Size:    Follow-up Recommendation1:      2.6-2.9 cm     Every 5 years 2      3.0-3.4 cm       Every 3 years      3.5-3.9 cm      Every 2 years      4.0-4.4 cm     Every 12 months,  recommend vascular consultation      4.5-5.4 cm     Every 6 months,  recommend vascular consultation      >5.5 cm        Referral to vascular specialist   ______________________________________________________________________________   _______ 1 Based on ACR White paper: IJ am Laura Radioli [id number].  2   For aortas of maximum diameter 2.6--2.9 cm meeting the criteria for AAA (>1.5   x proximal normal segment; no f/u if < 1.5 x proximal normal segment; no f/u   for aortas < 2.6 cm)      Note:  AAA enlargement of >0.5 cm in 6 months or >1 cm in 1 year, recommend   vascular consultation Note: Saccular AAA of any size, recommend vascular   consultation         XR CHEST (2 VW)   Final Result   Left post thoracotomy change with ipsilateral volume loss. Right suprahilar   known mass, similar to prior. Separately, both lungs show worsening   opacities which may reflect worsening metastatic lung disease. EKG Interpretation  Interpreted by emergency department physician, Dr. João Ortiz     Sinus rhythm, rate 90, no ischemic change      ------------------------- NURSING NOTES AND VITALS REVIEWED ---------------------------   The nursing notes within the ED encounter and vital signs as below have been reviewed by myself  /84   Pulse 90   Temp 98.2 °F (36.8 °C)   Wt 150 lb (68 kg)   SpO2 95%   BMI 22.15 kg/m²     Oxygen Saturation Interpretation: Normal    The patients available past medical records and past encounters were reviewed. ------------------------------ ED COURSE/MEDICAL DECISION MAKING----------------------  Medications - No data to display          Medical Decision Making:    Labs and imaging reviewed. Patient does have a compression fracture. Discussed with him and his wife, he is not comfortable going home. He has been having trouble functioning. His wife cannot care for him with this injury. Therefore, patient will be admitted. Counseling: The emergency provider has spoken with the patient and discussed todays results, in addition to providing specific details for the plan of care and counseling regarding the diagnosis and prognosis. Questions are answered at this time and they are agreeable with the plan.       --------------------------------- IMPRESSION AND DISPOSITION ---------------------------------    IMPRESSION  1.  Nontraumatic compression fracture of L1 vertebra, initial encounter ([facility] DISPOSITION  Disposition: Admit to med/surg floor  Patient condition is stable        NOTE: This report was transcribed using voice recognition software.  Every effort was made to ensure accuracy; however, inadvertent computerized transcription errors may be present        Taz Astudillo MD  12/30/21 0002

## 2021-12-30 NOTE — PLAN OF CARE
Patient's chart updated to reflect:      . - HF care plan, HF education points and HF discharge instructions.  -Orders: 2 gram sodium diet, daily weights, I/O.  -PCP and/or Cardiologist appointment to be scheduled within 7 days of hospital discharge.   Elva Andres, RN RN, BSN  Heart Failure Navigator

## 2021-12-30 NOTE — PROGRESS NOTES
Physical Therapy    PT order received and medical chart reviewed 12/30 AM. Holding initial evaluation for neurosurgical recommendations/POC. Will follow. Thank you.     Sathish Craven, PT, DPT  [id number]

## 2021-12-30 NOTE — CONSULTS
NEUROSURGERY CONSULTATION     Chief Complaint: L1 compression fracture. HPI:   Mandeep Collado is a 81 y.o.  male who has history of lung cancer presents to the ED last night for back pain. No known trauma to the area, patient states he was bending down to tie his shoe when this back pain started. Pain is located in the back, does not radiate to either leg. Pain is worse with bending and twisting. Denies any associated numbness or weakness. Pain is causing him to have trouble walking. No bowel or bladder incontinence. CT lumbar spine showed acute L1 compression fracture which was why Neurosurgery was consulted.      Past Medical History:   Diagnosis Date    A-fib [facility])     AAA (abdominal aortic aneurysm) ([facility]     measures 5.0 cm    Cancer ([facility] 2005    left lung    CHF (congestive heart failure) ([facility])     COPD (chronic obstructive pulmonary disease) ([facility])     Heart failure ([facility])     Hyperlipidemia     Hypertension      Past Surgical History:   Procedure Laterality Date    BRONCHOSCOPY N/A 9/1/2021    BRONCHOSCOPY W/EBUS FNA performed by Jacoby Chakraborty MD at [address] [place] [address] N/A 9/1/2021    BRONCHOSCOPY ADD ON COMPUTER ASSISTED performed by Jacoby Chakraborty MD at [address] [place] [address]  9/1/2021    BRONCHOSCOPY/TRANSBRONCHIAL NEEDLE BIOPSY performed by Jacoby Chakraborty MD at [address] [place] [address]  9/1/2021    BRONCHOSCOPY ALVEOLAR LAVAGE performed by Jacoby Chakraborty MD at [address] TEST N/A 06/16/2021    Lexiscan stress test    COLONOSCOPY      years ago    CT NEEDLE BIOPSY LUNG PERCUTANEOUS  7/1/2021    CT NEEDLE BIOPSY LUNG PERCUTANEOUS 7/1/2021 Kvng Kate MD [facility] CT    LUNG CANCER SURGERY  01/01/2005      Family History   Problem Relation Age of Onset    Cancer Sister         melanoma    Breast Cancer Sister     Heart Disease Brother       Social History     Socioeconomic History    Marital status:      Spouse name: Not on file    Number of children: 0    Years of education: Not on file    Highest education level: Not on file   Occupational History    Not on file   Tobacco Use    Smoking status: Former Smoker     Packs/day: 1.00     Years: 10.00     Pack years: 10.00     Types: Cigarettes     Quit date: 2001     Years since quittin.5    Smokeless tobacco: Never Used   Vaping Use    Vaping Use: Never used   Substance and Sexual Activity    Alcohol use: Not Currently     Comment: no ETOH since     Drug use: Never    Sexual activity: Not on file   Other Topics Concern    Not on file   Social History Narrative    Not on file     Social Determinants of Health     Financial Resource Strain:     Difficulty of Paying Living Expenses: Not on file   Food Insecurity:     Worried About [address] in the Last Year: Not on file    [address] [place] in the Last Year: Not on file   Transportation Needs:     Lack of Transportation (Medical): Not on file    Lack of Transportation (Non-Medical):  Not on file   Physical Activity:     Days of Exercise per Week: Not on file    Minutes of Exercise per Session: Not on file   Stress:     Feeling of Stress : Not on file   Social Connections:     Frequency of Communication with Friends and Family: Not on file    Frequency of Social Gatherings with Friends and Family: Not on file    Attends Jehovah's witness Services: Not on file    Active Member of [address] [place] [address] or Organizations: Not on file    Attends Club or Organization Meetings: Not on file    Marital Status: Not on file   Intimate Partner Violence:     Fear of Current or Ex-Partner: Not on file    Emotionally Abused: Not on file    Physically Abused: Not on file    Sexually Abused: Not on file   Housing Stability:     Unable to Pay for Housing in the Last Year: Not on file    Number of [place] in the Last Year: Not on file    Unstable Housing in the Last Year: Not on file       Medications:   Current Facility-Administered Medications   Medication Dose Route Frequency Provider Last Rate Last Admin    atorvastatin (LIPITOR) tablet 10 mg  10 mg Oral Every Other Day GRACIELA Aguayo - CNP   10 mg at 12/30/21 0835    bumetanide (BUMEX) tablet 1 mg  1 mg Oral Daily Jerrod Barajas, APRN - CNP   1 mg at 12/30/21 0836    dilTIAZem (CARDIZEM CD) extended release capsule 120 mg  120 mg Oral Daily Jerrod Barajas, APRN - CNP   120 mg at 12/30/21 0836    isosorbide mononitrate (IMDUR) extended release tablet 60 mg  60 mg Oral Daily Jerrod Barajas, APRN - CNP   60 mg at 12/30/21 0836    lactulose (CHRONULAC) 10 GM/15ML solution 10 g  10 g Oral BID GRACIELA Aguayo - CNP        metoprolol succinate (TOPROL XL) extended release tablet 100 mg  100 mg Oral BID Jerrod Barajas APRN - CNP   100 mg at 12/30/21 0836    sodium chloride flush 0.9 % injection 5-40 mL  5-40 mL IntraVENous 2 times per day Jerrod Barajas, APRN - CNP        sodium chloride flush 0.9 % injection 5-40 mL  5-40 mL IntraVENous PRN Jerrod Barajas, APRN - CNP        0.9 % sodium chloride infusion  25 mL IntraVENous PRN Jerrod Barajas APRN - CNP        ondansetron (ZOFRAN-ODT) disintegrating tablet 4 mg  4 mg Oral Q8H PRN Jerrod Barajas APRN - CNP        Or    ondansetron (ZOFRAN) injection 4 mg  4 mg IntraVENous Q6H PRN Jerrod Barajas, APRN - CNP        acetaminophen (TYLENOL) tablet 650 mg  650 mg Oral Q6H PRN Jerrod Barajas, APRN - CNP        Or    acetaminophen (TYLENOL) suppository 650 mg  650 mg Rectal Q6H PRN Jerrod Barajas, APRN - CNP        potassium chloride (KLOR-CON M) extended release tablet 40 mEq  40 mEq Oral PRN Jerrod Barajas, APRN - CNP        Or    potassium bicarb-citric acid (EFFER-K) effervescent tablet 40 mEq  40 mEq Oral PRN Jerrod Barajas, APRN - CNP        Or    potassium chloride 10 mEq/100 mL IVPB (Peripheral Line)  10 mEq IntraVENous PRN Jerrod Arvizuis, APRN - CNP        senna (SENOKOT) tablet 8.6 mg  1 tablet Oral Daily PRN López , APRN - CNP        oxyCODONE (ROXICODONE) immediate release tablet 5 mg  5 mg Oral Q6H PRN Lópezkristin Infante, APRN - CNP   5 mg at 21 0836    ipratropium-albuterol (DUONEB) nebulizer solution 1 ampule  1 ampule Inhalation Q4H WA Shannan Herron MD   1 ampule at 21 8976    [Held by provider] apixaban [employer]) tablet 2.5 mg  2.5 mg Oral BID Shannan Herron MD            Allergies:    Patient has no known allergies. Review of Systems   Constitutional: Negative for fever. HENT: Negative for trouble swallowing. Eyes: Negative for photophobia. Respiratory: Negative for shortness of breath. Cardiovascular: Negative for chest pain. Gastrointestinal: Negative for abdominal pain. Endocrine: Negative for heat intolerance. Genitourinary: Negative for flank pain. Musculoskeletal: Positive for back pain. Negative for myalgias. Skin: Negative for wound. Neurological: Negative for weakness, numbness and headaches. Psychiatric/Behavioral: Negative for confusion. Physical Exam  HENT:      Head: Normocephalic. Eyes:      Pupils: Pupils are equal, round, and reactive to light. Cardiovascular:      Rate and Rhythm: Normal rate. Pulmonary:      Effort: Pulmonary effort is normal.   Abdominal:      General: There is no distension. Skin:     General: Skin is warm and dry. Neurological:      Mental Status: He is alert. Comments: A&Ox3  CN3-12 intact  SANDERSON  Sensation intact to light touch    Psychiatric:         Thought Content: Thought content normal.          BP (!) 145/95   Pulse 84   Temp 97 °F (36.1 °C) (Temporal)   Resp 21   Ht 5' 9\" (1.753 m)   Wt 150 lb (68 kg)   SpO2 97%   BMI 22.15 kg/m²      IMAGIN2021 CT Lumbar Spine  Impression   1. New acute appearing compression fracture of the L1 vertebral body   2. Degenerative spondylolisthesis at L2-L3 causing moderate spinal stenosis   3.  Degenerative disc disease at L4-L5, stable   4. Increased size of the abdominal aorta aneurysm, measuring           Assessment:   -Butch Yoon is a 80 y/o male who CT showed L1 compression fracture. Stable. Plan:  -Pain control  -TLSO ordered, will obtain upright films once brace is recievec  -PT/OT once XR and brace are received  -Please call with any questions. Electronically signed by Mabel Garcia PA-C on 12/30/2021 at 12:35 PM     Nsx Attending:    Patient was seen and examined by me with the team.  I personally reviewed all pertinent radiological images. I concur with Miss Dias's clinical assessment and plan. In brief this 81-year-old gentleman who carries a diagnosis of non-small cell lung carcinoma and is status post chemotherapy presents with new onset back pain. Patient endorses that he was tying his shoelace when he felt a pop and had severe low back pain. He denied any radiculopathy. There is been no loss of bowel or bladder function. Power is preserved in the lower extremities for me on exam and CT scan demonstrated L1 compression fracture which may represent pathologic fracture. Recommend follow-up MRI if able and brace for comfort with pain management. Thank you so much for allowing us to participate in the care of this patient. NOTE: This report was transcribed using voice recognition software.  Every effort was made to ensure accuracy; however, inadvertent computerized transcription errors may be present

## 2021-12-30 NOTE — CARE COORDINATION
Met with patient in room. He lives home in a 1 story home. He states he was independent at home and uses no DME. He is currently on 2L of oxygen but reports no home oxygen. May need pulse ox testing prior to discharge if unable to wean to assess for home oxygen need. PCP is Dr Kathleen Iverson and Pharmacy is [facility] on [address]. No history of HHC or [facility]. Awaiting brace for therapy to work with patient, once therapy evals complete will assess discharge needs better. SW/CM following.

## 2021-12-31 NOTE — CONSULTS
Comprehensive Nutrition Assessment    Type and Reason for Visit:  Initial,Consult    Nutrition Recommendations/Plan: Continue current diet, Start Ensure BID    Nutrition Assessment:  Pt adm w/ compression fx. Noted lung CA s/p recent chemoXRT, COPD, CHF. Pt w/ minimal PO intake. Will start ONS and monitor    Malnutrition Assessment:  Malnutrition Status: Moderate malnutrition    Context:  Chronic Illness     Findings of the 6 clinical characteristics of malnutrition:  Energy Intake:  7 - 75% or less estimated energy requirements for 1 month or longer  Weight Loss:  No significant weight loss     Body Fat Loss:  1 - Mild body fat loss Orbital,Triceps   Muscle Mass Loss:  1 - Mild muscle mass loss Temples (temporalis),Clavicles (pectoralis & deltoids)  Fluid Accumulation:  No significant fluid accumulation     Strength:  Not Performed    Estimated Daily Nutrient Needs:  Energy (kcal):   (MSJ 1347 x 1.2 SF); Weight Used for Energy Requirements:  Current     Protein (g):  80-95; Weight Used for Protein Requirements:  Current (1.2-1.4)        Fluid (ml/day):  ; Method Used for Fluid Requirements:  1 ml/kcal      Nutrition Related Findings:  pt alert, soft abd, active BS, +2 pitting edema, -I/Os      Wounds:  None       Current Nutrition Therapies:    ADULT DIET; Regular;  Low Sodium (2 gm)    Anthropometric Measures:  · Height: 5' 9\" (175.3 cm)  · Current Body Weight: 148 lb (67.1 kg) (bed scale 12/31)   · Admission Body Weight: 150 lb (68 kg) (actual per EMR 12/30)    · Usual Body Weight: 147 lb (66.7 kg) (actual per EMR 06/2021)     · Ideal Body Weight: 160 lbs; % Ideal Body Weight 92.5 %   · BMI: 21.8  · BMI Categories: Underweight (BMI less than 22) age over 72       Nutrition Diagnosis:   · Moderate malnutrition,In context of chronic illness related to catabolic illness (lung CA) as evidenced by poor intake prior to admission,mild loss of subcutaneous fat,mild muscle loss      Nutrition Interventions:   Food and/or Nutrient Delivery:  Continue Current Diet,Start Oral Nutrition Supplement (Ensure BID)  Nutrition Education/Counseling:  Education not indicated   Coordination of Nutrition Care:  Continue to monitor while inpatient    Goals:  Consume >50% meals/ONS       Nutrition Monitoring and Evaluation:   Behavioral-Environmental Outcomes:  None Identified   Food/Nutrient Intake Outcomes:  Diet Advancement/Tolerance,Food and Nutrient Intake,Supplement Intake  Physical Signs/Symptoms Outcomes:  Biochemical Data,GI Status,Fluid Status or Edema,Nutrition Focused Physical Findings,Skin,Weight     Discharge Planning:     Too soon to determine     Electronically signed by Leandra Jimenez MS, RD, LD on 12/31/21 at 1:55 PM EST    Contact: [phone]

## 2021-12-31 NOTE — PROGRESS NOTES
Harry Card MD, FACP                   Patient Name: Flor Beatty                   Age:  81 y.o. Gender:   male    CC: Back pain    HPI: History obtained from multiple sources, chart review patient interview. This 61-year-old male with a history of lung cancer and chemotherapy coupled with radiation therapy presents with complaint of low back pain. There is no specific trauma associated with this however he stated he was bending down when he had the onset of pain about a week ago. He actually denies shortness of breath but is significantly dyspneic on speech.     Initial emergency room evaluation:  BUN 35 creatinine 1.9  High-sensitivity troponin was elevated but delta troponin was negative  INR 2.2    Lumbar spine CT:  New compression fracture L1 vertebral body  Moderate spinal stenosis  Increasing abdominal aortic aneurysm to 5.4 cm    Chest x-ray post left thoracotomy  Right suprahilar mass unchanged  Worsening appearance of metastatic lung disease  Vitals appear to be normal  Patient is on 2 L nasal cannula saturating at 99% although dyspneic on speech    12/31  Neurosurgical evaluation:  Pain control and brace  Patient stated his breathing is at baseline  Labs are stable  CKD appears a bit worse      Past Medical History:   Diagnosis Date    [facility]fib [facility])     AAA [facility]abdominal aortic [facility]) ([facility]     measures 5.0 cm    Cancer ([facility] 2005    left lung    CHF (congestive heart failure) ([facility]     COPD (chronic obstructive pulmonary disease) (HCC)     Heart failure ([facility]     Hyperlipidemia     Hypertension        Past Surgical History:   Procedure Laterality Date    BRONCHOSCOPY N/A 9/1/2021    BRONCHOSCOPY W/EBUS FNA performed by Sera Colvin MD at [facility] 9/1/2021    BRONCHOSCOPY ADD ON COMPUTER ASSISTED performed by Vandana Arce Bisi You MD at [address]  9/1/2021    BRONCHOSCOPY/TRANSBRONCHIAL NEEDLE BIOPSY performed by Beverly Moore MD at [address]  9/1/2021    BRONCHOSCOPY ALVEOLAR LAVAGE performed by Beverly Moore MD at [address] TEST N/A 06/16/2021    Lexiscan stress test    COLONOSCOPY      years ago    CT NEEDLE BIOPSY LUNG PERCUTANEOUS  7/1/2021    CT NEEDLE BIOPSY LUNG PERCUTANEOUS 7/1/2021 Joey Herzog MD [facility]    LUNG CANCER SURGERY  01/01/2005       No Known Allergies    The patient's medical records have been reviewed contingent on availability    Review of Systems:   · General: Some malaise and weakness noted denies fever or chills. · As per HPI      Physical Examination:      Wt Readings from Last 3 Encounters:   12/31/21 148 lb 6.4 oz (67.3 kg)   12/28/21 150 lb (68 kg)   12/15/21 151 lb (68.5 kg)     Temp Readings from Last 3 Encounters:   12/31/21 97.3 °F (36.3 °C) (Temporal)   12/28/21 97 °F (36.1 °C) (Infrared)   12/15/21 97.1 °F (36.2 °C) (Skin)     BP Readings from Last 3 Encounters:   12/31/21 (!) 111/90   12/28/21 119/71   12/15/21 (!) 148/68     Pulse Readings from Last 3 Encounters:   12/31/21 78   12/28/21 111   12/15/21 87       General appearance: Frail appearing awake, alert no distress. Less Dyspneic on speech  Skin: Color, texture, turgor normal. No rashes or lesions. Eyes: Conjunctivae/cornea clear. Jose Drier. Sclera non icteric. Neck:  Symmetric. No adenopathy. Lungs: Harsh breath sounds bilaterally, no evidence of bronchospasm, no evidence of focal consolidation on examination --- current oxygen saturation on 2 L measured at bedside 95%  Heart: S1 > S2. Rhythm is regular and rate is normal. No gallop rub or murmur. Extremities: No deformities, edema, or skin discoloration.    Musculoskeletal: As noted above low back pain reproducible by percussion  Neuro:   · Grossly normal  Mental status: Awake, alert, cognizant of person, place, time. Patient appears capable of directing self care   Mood: Normal and appropriate affect  Gait & balance: not assessed: Assisted     Labs     CBC:   Lab Results   Component Value Date    WBC 9.8 12/30/2021    RBC 3.84 12/30/2021    HGB 12.1 12/30/2021    HCT 36.2 12/30/2021     12/30/2021    MCV 94.3 12/30/2021     BMP:    Lab Results   Component Value Date     12/30/2021    K 4.6 12/30/2021     12/30/2021    CO2 25 12/30/2021    BUN 51 12/30/2021    CREATININE 1.8 12/30/2021    GLUCOSE 98 12/30/2021    CALCIUM 8.9 12/30/2021     Hepatic Function Panel:    Lab Results   Component Value Date    ALKPHOS 88 12/30/2021    AST 21 12/30/2021    ALT 21 12/30/2021    PROT 5.1 12/30/2021    LABALBU 2.9 12/30/2021    BILITOT 1.3 12/30/2021     Magnesium:    Lab Results   Component Value Date    MG 2.1 11/08/2021     Cardiac Enzymes:   Lab Results   Component Value Date    CKTOTAL 70 03/18/2016    CKMB 2.7 03/18/2016    TROPONINI <0.01 03/18/2016     LDH:  No results found for: LDH  PT/INR:    Lab Results   Component Value Date    PROTIME 24.2 12/30/2021    INR 2.2 12/30/2021     BNP: No results for input(s): BNP in the last 72 hours. TSH:   Lab Results   Component Value Date    TSH 3.130 06/16/2021      Cardiac Injury Profile: No results for input(s): CKTOTAL, CKMB, CKMBINDEX, TROPONINI in the last 72 hours.    Lipid Profile:   Lab Results   Component Value Date    TRIG 68 06/25/2021    HDL 38 06/25/2021    LDLCALC 43 06/25/2021    CHOL 95 06/25/2021      Hemoglobin A1C: No components found for: HGBA1C   U/A:   Lab Results   Component Value Date    LEUKOCYTESUR Negative 12/29/2021    PHUR 5.5 12/29/2021    WBCUA 0-1 06/15/2021    RBCUA 0-1 06/15/2021    BACTERIA RARE 06/15/2021    SPECGRAV 1.020 12/29/2021    BLOODU Negative 12/29/2021    GLUCOSEU Negative 12/29/2021         ADMISSION SCHEDULED MEDS:   Current Facility-Administered Medications   Medication Dose Route Frequency Provider Last Rate Last Admin    atorvastatin (LIPITOR) tablet 10 mg  10 mg Oral Every Other Day Teagan Furnish, APRN - CNP   10 mg at 12/30/21 0835    bumetanide (BUMEX) tablet 1 mg  1 mg Oral Daily Teagan Furnish, APRN - CNP   1 mg at 12/31/21 0855    dilTIAZem (CARDIZEM CD) extended release capsule 120 mg  120 mg Oral Daily Teagan Furnish, APRN - CNP   120 mg at 12/31/21 0855    isosorbide mononitrate (IMDUR) extended release tablet 60 mg  60 mg Oral Daily Teagan Furnish, APRN - CNP   60 mg at 12/31/21 0855    lactulose (CHRONULAC) 10 GM/15ML solution 10 g  10 g Oral BID Teagan Furnish, APRN - CNP   10 g at 12/31/21 0856    metoprolol succinate (TOPROL XL) extended release tablet 100 mg  100 mg Oral BID Teagan Furnish, APRN - CNP   100 mg at 12/31/21 0855    sodium chloride flush 0.9 % injection 5-40 mL  5-40 mL IntraVENous 2 times per day Teagan Furnish, APRN - CNP   10 mL at 12/31/21 0856    sodium chloride flush 0.9 % injection 5-40 mL  5-40 mL IntraVENous PRN Teagan Furnish, APRN - CNP        0.9 % sodium chloride infusion  25 mL IntraVENous PRN Teagan Furnish, APRN - CNP        ondansetron (ZOFRAN-ODT) disintegrating tablet 4 mg  4 mg Oral Q8H PRN Teagan Furnish, APRN - CNP        Or    ondansetron (ZOFRAN) injection 4 mg  4 mg IntraVENous Q6H PRN Teagan Furnish, APRN - CNP        acetaminophen (TYLENOL) tablet 650 mg  650 mg Oral Q6H PRN Teagan Furnish, APRN - CNP        Or    acetaminophen (TYLENOL) suppository 650 mg  650 mg Rectal Q6H PRN Teagan Furnish, APRN - CNP        potassium chloride (KLOR-CON M) extended release tablet 40 mEq  40 mEq Oral PRN Teagan Furnish, APRN - CNP        Or    potassium bicarb-citric acid (EFFER-K) effervescent tablet 40 mEq  40 mEq Oral PRN Teagan Furnish, APRN - CNP        Or    potassium chloride 10 mEq/100 mL IVPB (Peripheral Line)  10 mEq IntraVENous PRN Teagan GRACIELA Salter - CNP        senna (SENOKOT) tablet 8.6 mg  1 tablet Oral Daily PRN GRACIELA Oakley CNP        oxyCODONE (ROXICODONE) immediate release tablet 5 mg  5 mg Oral Q6H PRN GRACIELA Oakley CNP   5 mg at 12/30/21 2007    ipratropium-albuterol (DUONEB) nebulizer solution 1 ampule  1 ampule Inhalation Q4H WA Antonia Ford MD   1 ampule at 12/31/21 0850    [Held by provider] apixaban Shaimildred Kebede) tablet 2.5 mg  2.5 mg Oral BID Antonia Ford MD           Current  Infusions   sodium chloride         Prn Meds  sodium chloride flush, sodium chloride, ondansetron **OR** ondansetron, acetaminophen **OR** acetaminophen, potassium chloride **OR** potassium alternative oral replacement **OR** potassium chloride, senna, oxyCODONE    Radiology Review:  CT CHEST WO CONTRAST   Final Result   1. New multifocal sub solid consolidations bilaterally. Findings favor   multifocal pneumonia, although recurrent metastatic disease is not excluded. Recommend attention on follow-up exam.   2. Background of moderate emphysema. 3. Osteoporotic compression fracture of L1 with estimated 25% vertebral body   height loss is new since June 2021 and otherwise age indeterminate. No   significant bony retropulsion. RECOMMENDATIONS:   Unavailable         CT LUMBAR SPINE WO CONTRAST   Final Result   1. New acute appearing compression fracture of the L1 vertebral body   2. Degenerative spondylolisthesis at L2-L3 causing moderate spinal stenosis   3. Degenerative disc disease at L4-L5, stable   4.  Increased size of the abdominal aorta aneurysm, measuring      RECOMMENDATIONS:   Fusiform AAA Size:    Follow-up Recommendation1:      2.6-2.9 cm     Every 5 years 2      3.0-3.4 cm       Every 3 years      3.5-3.9 cm      Every 2 years      4.0-4.4 cm     Every 12 months,  recommend vascular consultation      4.5-5.4 cm     Every 6 months,  recommend vascular consultation      >5.5 cm        Referral to vascular specialist ______________________________________________________________________________   _______ 1 Based on ACR White paper: IJ am Laura Radioli 2023;[id number].  2   For aortas of maximum diameter 2.6--2.9 cm meeting the criteria for AAA (>1.5   x proximal normal segment; no f/u if < 1.5 x proximal normal segment; no f/u   for aortas < 2.6 cm)      Note:  AAA enlargement of >0.5 cm in 6 months or >1 cm in 1 year, recommend   vascular consultation Note: Saccular AAA of any size, recommend vascular   consultation         XR CHEST (2 VW)   Final Result   Left post thoracotomy change with ipsilateral volume loss. Right suprahilar   known mass, similar to prior. Separately, both lungs show worsening   opacities which may reflect worsening metastatic lung disease.                ASSESSMENT:  Active diagnoses treated at this admission:  · Acute L1 fracture  · Low back pain  · Metastatic lung cancer: Recent chemotherapy and radiation therapy completed  · History of COPD  · History of chronic congestive heart failure no evidence of current decompensation  · Atrial fibrillation currently rate controlled telemetry and ECG reviewed  · CKD    Problem list:  Patient Active Problem List   Diagnosis    COPD (chronic obstructive pulmonary disease) (HCC)    Hyperlipidemia LDL goal <100    Essential hypertension    Congestive heart failure (CHF) (HCC)    Lung mass    Atrial fibrillation (HCC)    Abdominal aortic aneurysm (AAA) without rupture (HCC)    Chronic systolic congestive heart failure (HCC)    Non-small cell cancer of right lung (HCC)    Malignant neoplasm of lung (HCC)    Compression fracture of first lumbar vertebra with routine healing    Abnormal WBC count - Low blast count noted       PLAN:  Orders as entered by nocturnist reviewed  Anticoagulation held in anticipation of intervention-will now resume  Continue diuretic therapy  Pain control is adequate  Consultation to neurosurgery reviewed awair brace then eval for mobility  Will require DVT prophylaxis  We will order straight aerosol treatments  Administer single dose of steroid in light of his history  In light of his current presentation consider further diuresis elevated BNP may be associated with lung findings  Monitor electrolytes closely monitor hydration closely  Await PT/OT assessment for next venue of care    See  Orders  Donetta Dakin, MD, Juvenal Knapp  10:42 AM  12/31/2021

## 2021-12-31 NOTE — PROGRESS NOTES
Physical Therapy    PT orders received and appreciated. Pt pending upright films of spine following arrival of TLSO per NS POC. PT will follow and attempt again at later time/date as appropriate. Thank you.     Reza Putnam, PT, DPT  [id number]

## 2022-01-01 ENCOUNTER — HOSPITAL ENCOUNTER (INPATIENT)
Age: 87
LOS: 4 days | Discharge: ANOTHER HOSPITAL | DRG: 299 | End: 2022-01-11
Attending: STUDENT IN AN ORGANIZED HEALTH CARE EDUCATION/TRAINING PROGRAM | Admitting: INTERNAL MEDICINE
Payer: MEDICARE

## 2022-01-01 ENCOUNTER — HOSPITAL ENCOUNTER (EMERGENCY)
Age: 87
Discharge: HOSPICE | End: 2022-01-12
Attending: EMERGENCY MEDICINE
Payer: MEDICARE

## 2022-01-01 ENCOUNTER — APPOINTMENT (OUTPATIENT)
Dept: CT IMAGING | Age: 87
DRG: 299 | End: 2022-01-01
Payer: MEDICARE

## 2022-01-01 ENCOUNTER — APPOINTMENT (OUTPATIENT)
Dept: GENERAL RADIOLOGY | Age: 87
DRG: 299 | End: 2022-01-01
Payer: MEDICARE

## 2022-01-01 ENCOUNTER — TELEPHONE (OUTPATIENT)
Dept: OTHER | Facility: CLINIC | Age: 87
End: 2022-01-01

## 2022-01-01 VITALS
TEMPERATURE: 98.7 F | RESPIRATION RATE: 16 BRPM | SYSTOLIC BLOOD PRESSURE: 97 MMHG | DIASTOLIC BLOOD PRESSURE: 50 MMHG | HEART RATE: 109 BPM | OXYGEN SATURATION: 94 %

## 2022-01-01 VITALS
DIASTOLIC BLOOD PRESSURE: 56 MMHG | SYSTOLIC BLOOD PRESSURE: 94 MMHG | OXYGEN SATURATION: 98 % | HEART RATE: 78 BPM | WEIGHT: 148 LBS | RESPIRATION RATE: 16 BRPM | BODY MASS INDEX: 23.23 KG/M2 | HEIGHT: 67 IN | TEMPERATURE: 96.8 F

## 2022-01-01 VITALS
DIASTOLIC BLOOD PRESSURE: 81 MMHG | RESPIRATION RATE: 14 BRPM | HEART RATE: 89 BPM | WEIGHT: 144.2 LBS | OXYGEN SATURATION: 97 % | TEMPERATURE: 97.3 F | HEIGHT: 69 IN | SYSTOLIC BLOOD PRESSURE: 122 MMHG | BODY MASS INDEX: 21.36 KG/M2
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
## 2022-01-01 DIAGNOSIS — C34.91 NON-SMALL CELL CANCER OF RIGHT LUNG (HCC): ICD-10-CM
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
## 2022-01-01 DIAGNOSIS — J18.9 PNEUMONIA DUE TO INFECTIOUS ORGANISM, UNSPECIFIED LATERALITY, UNSPECIFIED PART OF LUNG: Primary | ICD-10-CM

## 2022-01-01 DIAGNOSIS — Z66 DNR (DO NOT RESUSCITATE): ICD-10-CM

## 2022-01-01 DIAGNOSIS — R55 SYNCOPE AND COLLAPSE: ICD-10-CM

## 2022-01-01 DIAGNOSIS — J96.21 ACUTE ON CHRONIC RESPIRATORY FAILURE WITH HYPOXIA (HCC): ICD-10-CM

## 2022-01-01 DIAGNOSIS — R33.8 ACUTE URINARY RETENTION: ICD-10-CM

## 2022-01-01 DIAGNOSIS — I71.40 ABDOMINAL AORTIC ANEURYSM (AAA) WITHOUT RUPTURE: ICD-10-CM

## 2022-01-01 LAB
ACANTHOCYTES: ABNORMAL
ALBUMIN SERPL-MCNC: 2 G/DL (ref 3.5–5.2)
ALBUMIN SERPL-MCNC: 2.7 G/DL (ref 3.5–5.2)
ALP BLD-CCNC: 102 U/L (ref 40–129)
ALP BLD-CCNC: 120 U/L (ref 40–129)
ALT SERPL-CCNC: 136 U/L (ref 0–40)
ALT SERPL-CCNC: 249 U/L (ref 0–40)
ANION GAP SERPL CALCULATED.3IONS-SCNC: 11 MMOL/L (ref 7–16)
ANION GAP SERPL CALCULATED.3IONS-SCNC: 13 MMOL/L (ref 7–16)
ANION GAP SERPL CALCULATED.3IONS-SCNC: 14 MMOL/L (ref 7–16)
ANION GAP SERPL CALCULATED.3IONS-SCNC: 16 MMOL/L (ref 7–16)
ANION GAP SERPL CALCULATED.3IONS-SCNC: 17 MMOL/L (ref 7–16)
ANISOCYTOSIS: ABNORMAL
ANISOCYTOSIS: ABNORMAL
AST SERPL-CCNC: 149 U/L (ref 0–39)
AST SERPL-CCNC: 241 U/L (ref 0–39)
B.E.: 1.9 MMOL/L (ref -3–3)
BACTERIA: ABNORMAL /HPF
BASOPHILS ABSOLUTE: 0 E9/L (ref 0–0.2)
BASOPHILS ABSOLUTE: 0.04 E9/L (ref 0–0.2)
BASOPHILS RELATIVE PERCENT: 0 % (ref 0–2)
BASOPHILS RELATIVE PERCENT: 0.2 % (ref 0–2)
BILIRUB SERPL-MCNC: 1.6 MG/DL (ref 0–1.2)
BILIRUB SERPL-MCNC: 3 MG/DL (ref 0–1.2)
BILIRUBIN URINE: NEGATIVE
BLOOD CULTURE, ROUTINE: NORMAL
BLOOD, URINE: ABNORMAL
BUN BLDV-MCNC: 55 MG/DL (ref 6–23)
BUN BLDV-MCNC: 62 MG/DL (ref 6–23)
BUN BLDV-MCNC: 63 MG/DL (ref 6–23)
BUN BLDV-MCNC: 67 MG/DL (ref 6–23)
BUN BLDV-MCNC: 71 MG/DL (ref 6–23)
BURR CELLS: ABNORMAL
CALCIUM SERPL-MCNC: 7.4 MG/DL (ref 8.6–10.2)
CALCIUM SERPL-MCNC: 8.7 MG/DL (ref 8.6–10.2)
CALCIUM SERPL-MCNC: 8.9 MG/DL (ref 8.6–10.2)
CALCIUM SERPL-MCNC: 9 MG/DL (ref 8.6–10.2)
CALCIUM SERPL-MCNC: 9 MG/DL (ref 8.6–10.2)
CHLORIDE BLD-SCNC: 104 MMOL/L (ref 98–107)
CHLORIDE BLD-SCNC: 95 MMOL/L (ref 98–107)
CHLORIDE BLD-SCNC: 97 MMOL/L (ref 98–107)
CHLORIDE BLD-SCNC: 98 MMOL/L (ref 98–107)
CHLORIDE BLD-SCNC: 99 MMOL/L (ref 98–107)
CLARITY: CLEAR
CO2: 28 MMOL/L (ref 22–29)
CO2: 28 MMOL/L (ref 22–29)
CO2: 29 MMOL/L (ref 22–29)
CO2: 29 MMOL/L (ref 22–29)
CO2: 32 MMOL/L (ref 22–29)
COHB: 1.3 % (ref 0–1.5)
COLOR: YELLOW
CREAT SERPL-MCNC: 1.6 MG/DL (ref 0.7–1.2)
CREAT SERPL-MCNC: 1.8 MG/DL (ref 0.7–1.2)
CREAT SERPL-MCNC: 1.9 MG/DL (ref 0.7–1.2)
CREAT SERPL-MCNC: 1.9 MG/DL (ref 0.7–1.2)
CREAT SERPL-MCNC: 2.1 MG/DL (ref 0.7–1.2)
CRITICAL: ABNORMAL
CULTURE, BLOOD 2: NORMAL
D DIMER: 1218 NG/ML DDU
DATE ANALYZED: ABNORMAL
DATE OF COLLECTION: ABNORMAL
EKG ATRIAL RATE: 115 BPM
EKG Q-T INTERVAL: 358 MS
EKG QRS DURATION: 106 MS
EKG QTC CALCULATION (BAZETT): 433 MS
EKG R AXIS: 31 DEGREES
EKG T AXIS: -126 DEGREES
EKG VENTRICULAR RATE: 88 BPM
EOSINOPHILS ABSOLUTE: 0 E9/L (ref 0.05–0.5)
EOSINOPHILS ABSOLUTE: 0 E9/L (ref 0.05–0.5)
EOSINOPHILS RELATIVE PERCENT: 0 % (ref 0–6)
EOSINOPHILS RELATIVE PERCENT: 0 % (ref 0–6)
GFR AFRICAN AMERICAN: 36
GFR AFRICAN AMERICAN: 41
GFR AFRICAN AMERICAN: 41
GFR AFRICAN AMERICAN: 44
GFR AFRICAN AMERICAN: 50
GFR NON-AFRICAN AMERICAN: 30 ML/MIN/1.73
GFR NON-AFRICAN AMERICAN: 34 ML/MIN/1.73
GFR NON-AFRICAN AMERICAN: 34 ML/MIN/1.73
GFR NON-AFRICAN AMERICAN: 36 ML/MIN/1.73
GFR NON-AFRICAN AMERICAN: 41 ML/MIN/1.73
GLUCOSE BLD-MCNC: 105 MG/DL (ref 74–99)
GLUCOSE BLD-MCNC: 120 MG/DL (ref 74–99)
GLUCOSE BLD-MCNC: 125 MG/DL (ref 74–99)
GLUCOSE BLD-MCNC: 125 MG/DL (ref 74–99)
GLUCOSE BLD-MCNC: 130 MG/DL (ref 74–99)
GLUCOSE URINE: NEGATIVE MG/DL
HCO3: 25.2 MMOL/L (ref 22–26)
HCT VFR BLD CALC: 41.6 % (ref 37–54)
HCT VFR BLD CALC: 41.9 % (ref 37–54)
HCT VFR BLD CALC: 42.4 % (ref 37–54)
HCT VFR BLD CALC: 43.2 % (ref 37–54)
HCT VFR BLD CALC: 44.9 % (ref 37–54)
HCT VFR BLD CALC: 46.7 % (ref 37–54)
HEMOGLOBIN: 13.2 G/DL (ref 12.5–16.5)
HEMOGLOBIN: 13.3 G/DL (ref 12.5–16.5)
HEMOGLOBIN: 13.3 G/DL (ref 12.5–16.5)
HEMOGLOBIN: 13.5 G/DL (ref 12.5–16.5)
HEMOGLOBIN: 14 G/DL (ref 12.5–16.5)
HEMOGLOBIN: 14.7 G/DL (ref 12.5–16.5)
HHB: 4.3 % (ref 0–5)
IMMATURE GRANULOCYTES #: 0.51 E9/L
IMMATURE GRANULOCYTES %: 3.1 % (ref 0–5)
KETONES, URINE: NEGATIVE MG/DL
LAB: ABNORMAL
LEUKOCYTE ESTERASE, URINE: NEGATIVE
LIPASE: 11 U/L (ref 13–60)
LYMPHOCYTES ABSOLUTE: 0.38 E9/L (ref 1.5–4)
LYMPHOCYTES ABSOLUTE: 0.7 E9/L (ref 1.5–4)
LYMPHOCYTES RELATIVE PERCENT: 2.3 % (ref 20–42)
LYMPHOCYTES RELATIVE PERCENT: 5 % (ref 20–42)
Lab: ABNORMAL
MAGNESIUM: 2.3 MG/DL (ref 1.6–2.6)
MCH RBC QN AUTO: 31.7 PG (ref 26–35)
MCH RBC QN AUTO: 31.8 PG (ref 26–35)
MCH RBC QN AUTO: 32 PG (ref 26–35)
MCH RBC QN AUTO: 32.1 PG (ref 26–35)
MCH RBC QN AUTO: 32.2 PG (ref 26–35)
MCH RBC QN AUTO: 32.5 PG (ref 26–35)
MCHC RBC AUTO-ENTMCNC: 31.2 % (ref 32–34.5)
MCHC RBC AUTO-ENTMCNC: 31.3 % (ref 32–34.5)
MCHC RBC AUTO-ENTMCNC: 31.4 % (ref 32–34.5)
MCHC RBC AUTO-ENTMCNC: 31.5 % (ref 32–34.5)
MCHC RBC AUTO-ENTMCNC: 31.7 % (ref 32–34.5)
MCHC RBC AUTO-ENTMCNC: 31.7 % (ref 32–34.5)
MCV RBC AUTO: 100.7 FL (ref 80–99.9)
MCV RBC AUTO: 101.2 FL (ref 80–99.9)
MCV RBC AUTO: 102 FL (ref 80–99.9)
MCV RBC AUTO: 102.2 FL (ref 80–99.9)
MCV RBC AUTO: 102.5 FL (ref 80–99.9)
MCV RBC AUTO: 102.9 FL (ref 80–99.9)
METHB: 0.3 % (ref 0–1.5)
MODE: ABNORMAL
MONOCYTES ABSOLUTE: 0.98 E9/L (ref 0.1–0.95)
MONOCYTES ABSOLUTE: 1.13 E9/L (ref 0.1–0.95)
MONOCYTES RELATIVE PERCENT: 6.8 % (ref 2–12)
MONOCYTES RELATIVE PERCENT: 7 % (ref 2–12)
MYELOCYTE PERCENT: 1 % (ref 0–0)
NEUTROPHILS ABSOLUTE: 12.32 E9/L (ref 1.8–7.3)
NEUTROPHILS ABSOLUTE: 14.44 E9/L (ref 1.8–7.3)
NEUTROPHILS RELATIVE PERCENT: 87 % (ref 43–80)
NEUTROPHILS RELATIVE PERCENT: 87.6 % (ref 43–80)
NITRITE, URINE: NEGATIVE
NUCLEATED RED BLOOD CELLS: 2 /100 WBC
O2 CONTENT: 19.2 ML/DL
O2 SATURATION: 95.6 % (ref 92–98.5)
O2HB: 94.1 % (ref 94–97)
OPERATOR ID: 913
OVALOCYTES: ABNORMAL
OVALOCYTES: ABNORMAL
PATIENT TEMP: 37 C
PCO2: 35.7 MMHG (ref 35–45)
PDW BLD-RTO: 20.1 FL (ref 11.5–15)
PDW BLD-RTO: 20.2 FL (ref 11.5–15)
PDW BLD-RTO: 20.3 FL (ref 11.5–15)
PDW BLD-RTO: 20.4 FL (ref 11.5–15)
PDW BLD-RTO: 20.4 FL (ref 11.5–15)
PDW BLD-RTO: 20.7 FL (ref 11.5–15)
PH BLOOD GAS: 7.47 (ref 7.35–7.45)
PH UA: 5 (ref 5–9)
PLATELET # BLD: 177 E9/L (ref 130–450)
PLATELET # BLD: 195 E9/L (ref 130–450)
PLATELET # BLD: 195 E9/L (ref 130–450)
PLATELET # BLD: 210 E9/L (ref 130–450)
PLATELET # BLD: 228 E9/L (ref 130–450)
PLATELET # BLD: 236 E9/L (ref 130–450)
PMV BLD AUTO: 10.8 FL (ref 7–12)
PMV BLD AUTO: 11.4 FL (ref 7–12)
PMV BLD AUTO: 11.4 FL (ref 7–12)
PMV BLD AUTO: 11.5 FL (ref 7–12)
PMV BLD AUTO: 11.6 FL (ref 7–12)
PMV BLD AUTO: 11.8 FL (ref 7–12)
PO2: 79.2 MMHG (ref 75–100)
POIKILOCYTES: ABNORMAL
POIKILOCYTES: ABNORMAL
POLYCHROMASIA: ABNORMAL
POLYCHROMASIA: ABNORMAL
POTASSIUM REFLEX MAGNESIUM: 3.4 MMOL/L (ref 3.5–5)
POTASSIUM REFLEX MAGNESIUM: 3.7 MMOL/L (ref 3.5–5)
POTASSIUM REFLEX MAGNESIUM: 4.4 MMOL/L (ref 3.5–5)
POTASSIUM SERPL-SCNC: 3.2 MMOL/L (ref 3.5–5)
POTASSIUM SERPL-SCNC: 4.3 MMOL/L (ref 3.5–5)
PROTEIN UA: NEGATIVE MG/DL
RBC # BLD: 4.06 E12/L (ref 3.8–5.8)
RBC # BLD: 4.16 E12/L (ref 3.8–5.8)
RBC # BLD: 4.19 E12/L (ref 3.8–5.8)
RBC # BLD: 4.2 E12/L (ref 3.8–5.8)
RBC # BLD: 4.4 E12/L (ref 3.8–5.8)
RBC # BLD: 4.57 E12/L (ref 3.8–5.8)
RBC UA: ABNORMAL /HPF (ref 0–2)
REASON FOR REJECTION: NORMAL
REJECTED TEST: NORMAL
SARS-COV-2, NAAT: NOT DETECTED
SCHISTOCYTES: ABNORMAL
SODIUM BLD-SCNC: 139 MMOL/L (ref 132–146)
SODIUM BLD-SCNC: 139 MMOL/L (ref 132–146)
SODIUM BLD-SCNC: 143 MMOL/L (ref 132–146)
SODIUM BLD-SCNC: 144 MMOL/L (ref 132–146)
SODIUM BLD-SCNC: 145 MMOL/L (ref 132–146)
SOURCE, BLOOD GAS: ABNORMAL
SPECIFIC GRAVITY UA: 1.01 (ref 1–1.03)
THB: 14.5 G/DL (ref 11.5–16.5)
TIME ANALYZED: 1811
TOTAL PROTEIN: 4.2 G/DL (ref 6.4–8.3)
TOTAL PROTEIN: 5.5 G/DL (ref 6.4–8.3)
TROPONIN, HIGH SENSITIVITY: 39 NG/L (ref 0–11)
UROBILINOGEN, URINE: 0.2 E.U./DL
WBC # BLD: 14 E9/L (ref 4.5–11.5)
WBC # BLD: 15 E9/L (ref 4.5–11.5)
WBC # BLD: 15.1 E9/L (ref 4.5–11.5)
WBC # BLD: 16.5 E9/L (ref 4.5–11.5)
WBC # BLD: 17.9 E9/L (ref 4.5–11.5)
WBC # BLD: 18.7 E9/L (ref 4.5–11.5)
WBC UA: ABNORMAL /HPF (ref 0–5)

## 2022-01-01 PROCEDURE — 97530 THERAPEUTIC ACTIVITIES: CPT

## 2022-01-01 PROCEDURE — 94640 AIRWAY INHALATION TREATMENT: CPT

## 2022-01-01 PROCEDURE — 6370000000 HC RX 637 (ALT 250 FOR IP): Performed by: INTERNAL MEDICINE

## 2022-01-01 PROCEDURE — 2700000000 HC OXYGEN THERAPY PER DAY

## 2022-01-01 PROCEDURE — 2580000003 HC RX 258: Performed by: PHYSICIAN ASSISTANT

## 2022-01-01 PROCEDURE — 36415 COLL VENOUS BLD VENIPUNCTURE: CPT

## 2022-01-01 PROCEDURE — 85025 COMPLETE CBC W/AUTO DIFF WBC: CPT

## 2022-01-01 PROCEDURE — 6370000000 HC RX 637 (ALT 250 FOR IP): Performed by: NURSE PRACTITIONER

## 2022-01-01 PROCEDURE — 6370000000 HC RX 637 (ALT 250 FOR IP): Performed by: PHYSICIAN ASSISTANT

## 2022-01-01 PROCEDURE — 6360000002 HC RX W HCPCS: Performed by: INTERNAL MEDICINE

## 2022-01-01 PROCEDURE — 2580000003 HC RX 258: Performed by: INTERNAL MEDICINE

## 2022-01-01 PROCEDURE — 97535 SELF CARE MNGMENT TRAINING: CPT

## 2022-01-01 PROCEDURE — 2580000003 HC RX 258: Performed by: NURSE PRACTITIONER

## 2022-01-01 PROCEDURE — 6360000002 HC RX W HCPCS: Performed by: EMERGENCY MEDICINE

## 2022-01-01 PROCEDURE — 74176 CT ABD & PELVIS W/O CONTRAST: CPT

## 2022-01-01 PROCEDURE — 2060000000 HC ICU INTERMEDIATE R&B

## 2022-01-01 PROCEDURE — 1200000000 HC SEMI PRIVATE

## 2022-01-01 PROCEDURE — 80053 COMPREHEN METABOLIC PANEL: CPT

## 2022-01-01 PROCEDURE — 99284 EMERGENCY DEPT VISIT MOD MDM: CPT

## 2022-01-01 PROCEDURE — 81001 URINALYSIS AUTO W/SCOPE: CPT

## 2022-01-01 PROCEDURE — 80048 BASIC METABOLIC PNL TOTAL CA: CPT

## 2022-01-01 PROCEDURE — 70450 CT HEAD/BRAIN W/O DYE: CPT

## 2022-01-01 PROCEDURE — 83735 ASSAY OF MAGNESIUM: CPT

## 2022-01-01 PROCEDURE — 74174 CTA ABD&PLVS W/CONTRAST: CPT

## 2022-01-01 PROCEDURE — 87635 SARS-COV-2 COVID-19 AMP PRB: CPT

## 2022-01-01 PROCEDURE — 96374 THER/PROPH/DIAG INJ IV PUSH: CPT

## 2022-01-01 PROCEDURE — 85027 COMPLETE CBC AUTOMATED: CPT

## 2022-01-01 PROCEDURE — 82805 BLOOD GASES W/O2 SATURATION: CPT

## 2022-01-01 PROCEDURE — 87040 BLOOD CULTURE FOR BACTERIA: CPT

## 2022-01-01 PROCEDURE — 6360000002 HC RX W HCPCS: Performed by: GENERAL PRACTICE

## 2022-01-01 PROCEDURE — 97161 PT EVAL LOW COMPLEX 20 MIN: CPT

## 2022-01-01 PROCEDURE — 2580000003 HC RX 258: Performed by: GENERAL PRACTICE

## 2022-01-01 PROCEDURE — 83690 ASSAY OF LIPASE: CPT

## 2022-01-01 PROCEDURE — 71275 CT ANGIOGRAPHY CHEST: CPT

## 2022-01-01 PROCEDURE — 93010 ELECTROCARDIOGRAM REPORT: CPT | Performed by: INTERNAL MEDICINE

## 2022-01-01 PROCEDURE — 97165 OT EVAL LOW COMPLEX 30 MIN: CPT

## 2022-01-01 PROCEDURE — 84484 ASSAY OF TROPONIN QUANT: CPT

## 2022-01-01 PROCEDURE — 93005 ELECTROCARDIOGRAM TRACING: CPT | Performed by: PHYSICIAN ASSISTANT

## 2022-01-01 PROCEDURE — 85378 FIBRIN DEGRADE SEMIQUANT: CPT

## 2022-01-01 PROCEDURE — 2500000003 HC RX 250 WO HCPCS: Performed by: GENERAL PRACTICE

## 2022-01-01 PROCEDURE — 2580000003 HC RX 258: Performed by: STUDENT IN AN ORGANIZED HEALTH CARE EDUCATION/TRAINING PROGRAM

## 2022-01-01 PROCEDURE — 99285 EMERGENCY DEPT VISIT HI MDM: CPT

## 2022-01-01 PROCEDURE — 71045 X-RAY EXAM CHEST 1 VIEW: CPT

## 2022-01-01 PROCEDURE — 6360000004 HC RX CONTRAST MEDICATION: Performed by: RADIOLOGY

## 2022-01-01 PROCEDURE — 96372 THER/PROPH/DIAG INJ SC/IM: CPT

## 2022-01-01 RX ORDER — METOPROLOL SUCCINATE 100 MG/1
100 TABLET, EXTENDED RELEASE ORAL 2 TIMES DAILY
Status: DISCONTINUED | OUTPATIENT
Start: 2022-01-01 | End: 2022-01-01 | Stop reason: HOSPADM

## 2022-01-01 RX ORDER — POTASSIUM CHLORIDE 20 MEQ/1
40 TABLET, EXTENDED RELEASE ORAL DAILY
Status: DISCONTINUED | OUTPATIENT
Start: 2022-01-01 | End: 2022-01-01 | Stop reason: HOSPADM

## 2022-01-01 RX ORDER — SODIUM CHLORIDE 9 MG/ML
25 INJECTION, SOLUTION INTRAVENOUS PRN
Status: DISCONTINUED | OUTPATIENT
Start: 2022-01-01 | End: 2022-01-01 | Stop reason: HOSPADM

## 2022-01-01 RX ORDER — MORPHINE SULFATE 4 MG/ML
4 INJECTION, SOLUTION INTRAMUSCULAR; INTRAVENOUS ONCE
Status: DISCONTINUED | OUTPATIENT
Start: 2022-01-01 | End: 2022-01-01

## 2022-01-01 RX ORDER — POTASSIUM CHLORIDE 7.45 MG/ML
10 INJECTION INTRAVENOUS PRN
Status: DISCONTINUED | OUTPATIENT
Start: 2022-01-01 | End: 2022-01-01

## 2022-01-01 RX ORDER — MORPHINE SULFATE 4 MG/ML
8 INJECTION, SOLUTION INTRAMUSCULAR; INTRAVENOUS ONCE
Status: COMPLETED | OUTPATIENT
Start: 2022-01-01 | End: 2022-01-01

## 2022-01-01 RX ORDER — HYDROCODONE BITARTRATE AND ACETAMINOPHEN 5; 325 MG/1; MG/1
1 TABLET ORAL EVERY 6 HOURS PRN
Qty: 12 TABLET | Refills: 0 | Status: SHIPPED | OUTPATIENT
Start: 2022-01-01 | End: 2022-01-15

## 2022-01-01 RX ORDER — DRONABINOL 2.5 MG/1
2.5 CAPSULE ORAL
Status: DISCONTINUED | OUTPATIENT
Start: 2022-01-01 | End: 2022-01-01 | Stop reason: HOSPADM

## 2022-01-01 RX ORDER — HEPARIN SODIUM 10000 [USP'U]/ML
5000 INJECTION, SOLUTION INTRAVENOUS; SUBCUTANEOUS EVERY 8 HOURS
Status: DISCONTINUED | OUTPATIENT
Start: 2022-01-01 | End: 2022-01-01 | Stop reason: HOSPADM

## 2022-01-01 RX ORDER — 0.9 % SODIUM CHLORIDE 0.9 %
1000 INTRAVENOUS SOLUTION INTRAVENOUS ONCE
Status: COMPLETED | OUTPATIENT
Start: 2022-01-01 | End: 2022-01-01

## 2022-01-01 RX ORDER — SODIUM CHLORIDE 9 MG/ML
INJECTION, SOLUTION INTRAVENOUS EVERY 24 HOURS
Status: DISCONTINUED | OUTPATIENT
Start: 2022-01-01 | End: 2022-01-01 | Stop reason: HOSPADM

## 2022-01-01 RX ORDER — CEFDINIR 300 MG/1
300 CAPSULE ORAL 2 TIMES DAILY
Qty: 14 CAPSULE | Refills: 0 | Status: SHIPPED | OUTPATIENT
Start: 2022-01-01 | End: 2022-01-18

## 2022-01-01 RX ORDER — IPRATROPIUM BROMIDE AND ALBUTEROL SULFATE 2.5; .5 MG/3ML; MG/3ML
3 SOLUTION RESPIRATORY (INHALATION)
Qty: 360 ML | DISCHARGE
Start: 2022-01-01

## 2022-01-01 RX ORDER — ACETAMINOPHEN 325 MG/1
650 TABLET ORAL EVERY 6 HOURS PRN
Status: DISCONTINUED | OUTPATIENT
Start: 2022-01-01 | End: 2022-01-01 | Stop reason: HOSPADM

## 2022-01-01 RX ORDER — SODIUM CHLORIDE 0.9 % (FLUSH) 0.9 %
10 SYRINGE (ML) INJECTION PRN
Status: DISCONTINUED | OUTPATIENT
Start: 2022-01-01 | End: 2022-01-01 | Stop reason: HOSPADM

## 2022-01-01 RX ORDER — LACTULOSE 10 G/15ML
10 SOLUTION ORAL 2 TIMES DAILY
Status: DISCONTINUED | OUTPATIENT
Start: 2022-01-01 | End: 2022-01-01 | Stop reason: HOSPADM

## 2022-01-01 RX ORDER — DILTIAZEM HYDROCHLORIDE 120 MG/1
120 CAPSULE, COATED, EXTENDED RELEASE ORAL DAILY
Status: DISCONTINUED | OUTPATIENT
Start: 2022-01-01 | End: 2022-01-01 | Stop reason: HOSPADM

## 2022-01-01 RX ORDER — ONDANSETRON 4 MG/1
4 TABLET, ORALLY DISINTEGRATING ORAL EVERY 8 HOURS PRN
Status: DISCONTINUED | OUTPATIENT
Start: 2022-01-01 | End: 2022-01-01 | Stop reason: HOSPADM

## 2022-01-01 RX ORDER — IPRATROPIUM BROMIDE AND ALBUTEROL SULFATE 2.5; .5 MG/3ML; MG/3ML
3 SOLUTION RESPIRATORY (INHALATION)
Status: DISCONTINUED | OUTPATIENT
Start: 2022-01-01 | End: 2022-01-01 | Stop reason: HOSPADM

## 2022-01-01 RX ORDER — POTASSIUM CHLORIDE 20 MEQ/1
40 TABLET, EXTENDED RELEASE ORAL PRN
Status: DISCONTINUED | OUTPATIENT
Start: 2022-01-01 | End: 2022-01-01

## 2022-01-01 RX ORDER — MORPHINE SULFATE 4 MG/ML
4 INJECTION, SOLUTION INTRAMUSCULAR; INTRAVENOUS ONCE
Status: COMPLETED | OUTPATIENT
Start: 2022-01-01 | End: 2022-01-01

## 2022-01-01 RX ORDER — ACETAMINOPHEN 650 MG/1
650 SUPPOSITORY RECTAL EVERY 6 HOURS PRN
Status: DISCONTINUED | OUTPATIENT
Start: 2022-01-01 | End: 2022-01-01 | Stop reason: HOSPADM

## 2022-01-01 RX ORDER — ONDANSETRON 2 MG/ML
4 INJECTION INTRAMUSCULAR; INTRAVENOUS EVERY 6 HOURS PRN
Status: DISCONTINUED | OUTPATIENT
Start: 2022-01-01 | End: 2022-01-01 | Stop reason: HOSPADM

## 2022-01-01 RX ORDER — CLOTRIMAZOLE AND BETAMETHASONE DIPROPIONATE 10; .5 MG/ML; MG/ML
LOTION TOPICAL 2 TIMES DAILY
Status: DISCONTINUED | OUTPATIENT
Start: 2022-01-01 | End: 2022-01-01 | Stop reason: HOSPADM

## 2022-01-01 RX ORDER — SODIUM CHLORIDE 0.9 % (FLUSH) 0.9 %
10 SYRINGE (ML) INJECTION EVERY 12 HOURS SCHEDULED
Status: DISCONTINUED | OUTPATIENT
Start: 2022-01-01 | End: 2022-01-01 | Stop reason: HOSPADM

## 2022-01-01 RX ADMIN — BUMETANIDE 1 MG: 1 TABLET ORAL at 09:22

## 2022-01-01 RX ADMIN — METOPROLOL SUCCINATE 100 MG: 100 TABLET, EXTENDED RELEASE ORAL at 20:24

## 2022-01-01 RX ADMIN — DILTIAZEM HYDROCHLORIDE 120 MG: 120 CAPSULE, COATED, EXTENDED RELEASE ORAL at 09:35

## 2022-01-01 RX ADMIN — METOPROLOL SUCCINATE 100 MG: 100 TABLET, EXTENDED RELEASE ORAL at 10:26

## 2022-01-01 RX ADMIN — METOPROLOL SUCCINATE 100 MG: 100 TABLET, EXTENDED RELEASE ORAL at 09:08

## 2022-01-01 RX ADMIN — LACTULOSE 10 G: 20 SOLUTION ORAL at 09:08

## 2022-01-01 RX ADMIN — LACTULOSE 10 G: 20 SOLUTION ORAL at 00:10

## 2022-01-01 RX ADMIN — LACTULOSE 10 G: 20 SOLUTION ORAL at 10:12

## 2022-01-01 RX ADMIN — IPRATROPIUM BROMIDE AND ALBUTEROL SULFATE 1 AMPULE: .5; 2.5 SOLUTION RESPIRATORY (INHALATION) at 10:39

## 2022-01-01 RX ADMIN — IPRATROPIUM BROMIDE AND ALBUTEROL SULFATE 3 ML: .5; 2.5 SOLUTION RESPIRATORY (INHALATION) at 12:10

## 2022-01-01 RX ADMIN — IPRATROPIUM BROMIDE AND ALBUTEROL SULFATE 1 AMPULE: .5; 2.5 SOLUTION RESPIRATORY (INHALATION) at 10:44

## 2022-01-01 RX ADMIN — ATORVASTATIN CALCIUM 10 MG: 10 TABLET, FILM COATED ORAL at 09:16

## 2022-01-01 RX ADMIN — IPRATROPIUM BROMIDE AND ALBUTEROL SULFATE 1 AMPULE: .5; 2.5 SOLUTION RESPIRATORY (INHALATION) at 16:37

## 2022-01-01 RX ADMIN — Medication 10 ML: at 21:20

## 2022-01-01 RX ADMIN — APIXABAN 2.5 MG: 5 TABLET, FILM COATED ORAL at 09:16

## 2022-01-01 RX ADMIN — Medication 10 ML: at 08:50

## 2022-01-01 RX ADMIN — METOPROLOL SUCCINATE 100 MG: 100 TABLET, EXTENDED RELEASE ORAL at 09:46

## 2022-01-01 RX ADMIN — DOXYCYCLINE 100 MG: 100 INJECTION, POWDER, LYOPHILIZED, FOR SOLUTION INTRAVENOUS at 23:30

## 2022-01-01 RX ADMIN — POTASSIUM CHLORIDE 40 MEQ: 1500 TABLET, EXTENDED RELEASE ORAL at 17:39

## 2022-01-01 RX ADMIN — METOPROLOL SUCCINATE 100 MG: 100 TABLET, EXTENDED RELEASE ORAL at 20:53

## 2022-01-01 RX ADMIN — DILTIAZEM HYDROCHLORIDE 120 MG: 120 CAPSULE, COATED, EXTENDED RELEASE ORAL at 09:16

## 2022-01-01 RX ADMIN — OXYCODONE 5 MG: 5 TABLET ORAL at 10:26

## 2022-01-01 RX ADMIN — MORPHINE SULFATE 8 MG: 4 INJECTION, SOLUTION INTRAMUSCULAR; INTRAVENOUS at 00:18

## 2022-01-01 RX ADMIN — PETROLATUM: 420 OINTMENT TOPICAL at 00:47

## 2022-01-01 RX ADMIN — SODIUM CHLORIDE: 9 INJECTION, SOLUTION INTRAVENOUS at 17:40

## 2022-01-01 RX ADMIN — IPRATROPIUM BROMIDE AND ALBUTEROL SULFATE 1 AMPULE: .5; 2.5 SOLUTION RESPIRATORY (INHALATION) at 20:05

## 2022-01-01 RX ADMIN — METOPROLOL SUCCINATE 100 MG: 100 TABLET, EXTENDED RELEASE ORAL at 23:39

## 2022-01-01 RX ADMIN — CLOTRIMAZOLE AND BETAMETHASONE DIPROPIONATE: 10; .5 LOTION TOPICAL at 23:37

## 2022-01-01 RX ADMIN — CEFEPIME HYDROCHLORIDE 2000 MG: 2 INJECTION, POWDER, FOR SOLUTION INTRAVENOUS at 13:31

## 2022-01-01 RX ADMIN — IPRATROPIUM BROMIDE AND ALBUTEROL SULFATE 1 AMPULE: .5; 2.5 SOLUTION RESPIRATORY (INHALATION) at 16:16

## 2022-01-01 RX ADMIN — ATORVASTATIN CALCIUM 10 MG: 10 TABLET, FILM COATED ORAL at 10:25

## 2022-01-01 RX ADMIN — CEFEPIME HYDROCHLORIDE 2000 MG: 2 INJECTION, POWDER, FOR SOLUTION INTRAVENOUS at 13:34

## 2022-01-01 RX ADMIN — DILTIAZEM HYDROCHLORIDE 120 MG: 120 CAPSULE, COATED, EXTENDED RELEASE ORAL at 08:49

## 2022-01-01 RX ADMIN — IPRATROPIUM BROMIDE AND ALBUTEROL SULFATE 3 ML: .5; 2.5 SOLUTION RESPIRATORY (INHALATION) at 13:37

## 2022-01-01 RX ADMIN — HEPARIN SODIUM 5000 UNITS: 10000 INJECTION INTRAVENOUS; SUBCUTANEOUS at 00:10

## 2022-01-01 RX ADMIN — LACTULOSE 10 G: 20 SOLUTION ORAL at 09:35

## 2022-01-01 RX ADMIN — DILTIAZEM HYDROCHLORIDE 120 MG: 120 CAPSULE, COATED, EXTENDED RELEASE ORAL at 08:38

## 2022-01-01 RX ADMIN — METOPROLOL SUCCINATE 100 MG: 100 TABLET, EXTENDED RELEASE ORAL at 09:16

## 2022-01-01 RX ADMIN — IPRATROPIUM BROMIDE AND ALBUTEROL SULFATE 1 AMPULE: .5; 2.5 SOLUTION RESPIRATORY (INHALATION) at 20:11

## 2022-01-01 RX ADMIN — APIXABAN 2.5 MG: 5 TABLET, FILM COATED ORAL at 21:00

## 2022-01-01 RX ADMIN — PETROLATUM: 420 OINTMENT TOPICAL at 09:08

## 2022-01-01 RX ADMIN — ATORVASTATIN CALCIUM 10 MG: 10 TABLET, FILM COATED ORAL at 08:37

## 2022-01-01 RX ADMIN — Medication 10 ML: at 09:15

## 2022-01-01 RX ADMIN — CLOTRIMAZOLE AND BETAMETHASONE DIPROPIONATE: 10; .5 LOTION TOPICAL at 10:12

## 2022-01-01 RX ADMIN — ISOSORBIDE MONONITRATE 60 MG: 60 TABLET ORAL at 10:25

## 2022-01-01 RX ADMIN — Medication 10 ML: at 09:24

## 2022-01-01 RX ADMIN — ISOSORBIDE MONONITRATE 60 MG: 60 TABLET ORAL at 08:37

## 2022-01-01 RX ADMIN — OXYCODONE 5 MG: 5 TABLET ORAL at 20:22

## 2022-01-01 RX ADMIN — Medication 10 ML: at 12:21

## 2022-01-01 RX ADMIN — IOPAMIDOL 75 ML: 755 INJECTION, SOLUTION INTRAVENOUS at 20:31

## 2022-01-01 RX ADMIN — Medication 10 ML: at 20:54

## 2022-01-01 RX ADMIN — DILTIAZEM HYDROCHLORIDE 120 MG: 120 CAPSULE, COATED, EXTENDED RELEASE ORAL at 10:25

## 2022-01-01 RX ADMIN — ISOSORBIDE MONONITRATE 60 MG: 60 TABLET ORAL at 09:16

## 2022-01-01 RX ADMIN — SENNOSIDES 8.6 MG: 8.6 TABLET, COATED ORAL at 09:16

## 2022-01-01 RX ADMIN — METOPROLOL SUCCINATE 100 MG: 100 TABLET, EXTENDED RELEASE ORAL at 09:22

## 2022-01-01 RX ADMIN — Medication 10 ML: at 09:09

## 2022-01-01 RX ADMIN — HEPARIN SODIUM 5000 UNITS: 10000 INJECTION INTRAVENOUS; SUBCUTANEOUS at 13:43

## 2022-01-01 RX ADMIN — ISOSORBIDE MONONITRATE 60 MG: 60 TABLET ORAL at 09:22

## 2022-01-01 RX ADMIN — Medication 10 ML: at 20:53

## 2022-01-01 RX ADMIN — SODIUM CHLORIDE: 9 INJECTION, SOLUTION INTRAVENOUS at 17:39

## 2022-01-01 RX ADMIN — METOPROLOL SUCCINATE 100 MG: 100 TABLET, EXTENDED RELEASE ORAL at 08:50

## 2022-01-01 RX ADMIN — SODIUM CHLORIDE 1000 ML: 9 INJECTION, SOLUTION INTRAVENOUS at 22:55

## 2022-01-01 RX ADMIN — PETROLATUM: 420 OINTMENT TOPICAL at 20:59

## 2022-01-01 RX ADMIN — APIXABAN 2.5 MG: 5 TABLET, FILM COATED ORAL at 20:22

## 2022-01-01 RX ADMIN — METOPROLOL SUCCINATE 100 MG: 100 TABLET, EXTENDED RELEASE ORAL at 20:51

## 2022-01-01 RX ADMIN — HEPARIN SODIUM 5000 UNITS: 10000 INJECTION INTRAVENOUS; SUBCUTANEOUS at 13:35

## 2022-01-01 RX ADMIN — IPRATROPIUM BROMIDE AND ALBUTEROL SULFATE 3 ML: .5; 2.5 SOLUTION RESPIRATORY (INHALATION) at 08:39

## 2022-01-01 RX ADMIN — IPRATROPIUM BROMIDE AND ALBUTEROL SULFATE 1 AMPULE: .5; 2.5 SOLUTION RESPIRATORY (INHALATION) at 20:03

## 2022-01-01 RX ADMIN — IPRATROPIUM BROMIDE AND ALBUTEROL SULFATE 3 ML: .5; 2.5 SOLUTION RESPIRATORY (INHALATION) at 16:09

## 2022-01-01 RX ADMIN — LACTULOSE 10 G: 20 SOLUTION ORAL at 20:53

## 2022-01-01 RX ADMIN — IPRATROPIUM BROMIDE AND ALBUTEROL SULFATE 3 ML: .5; 2.5 SOLUTION RESPIRATORY (INHALATION) at 15:58

## 2022-01-01 RX ADMIN — Medication 10 ML: at 23:41

## 2022-01-01 RX ADMIN — APIXABAN 2.5 MG: 5 TABLET, FILM COATED ORAL at 09:46

## 2022-01-01 RX ADMIN — Medication 10 ML: at 01:41

## 2022-01-01 RX ADMIN — CEFEPIME HYDROCHLORIDE 2000 MG: 2 INJECTION, POWDER, FOR SOLUTION INTRAVENOUS at 13:43

## 2022-01-01 RX ADMIN — DRONABINOL 2.5 MG: 2.5 CAPSULE ORAL at 11:58

## 2022-01-01 RX ADMIN — METOPROLOL SUCCINATE 100 MG: 100 TABLET, EXTENDED RELEASE ORAL at 08:37

## 2022-01-01 RX ADMIN — ISOSORBIDE MONONITRATE 60 MG: 60 TABLET ORAL at 09:49

## 2022-01-01 RX ADMIN — IPRATROPIUM BROMIDE AND ALBUTEROL SULFATE 1 AMPULE: .5; 2.5 SOLUTION RESPIRATORY (INHALATION) at 17:13

## 2022-01-01 RX ADMIN — SODIUM CHLORIDE: 9 INJECTION, SOLUTION INTRAVENOUS at 17:45

## 2022-01-01 RX ADMIN — POTASSIUM CHLORIDE 40 MEQ: 1500 TABLET, EXTENDED RELEASE ORAL at 08:50

## 2022-01-01 RX ADMIN — LACTULOSE 10 G: 20 SOLUTION ORAL at 09:45

## 2022-01-01 RX ADMIN — Medication 10 ML: at 20:22

## 2022-01-01 RX ADMIN — MORPHINE SULFATE 4 MG: 4 INJECTION, SOLUTION INTRAMUSCULAR; INTRAVENOUS at 23:12

## 2022-01-01 RX ADMIN — HEPARIN SODIUM 5000 UNITS: 10000 INJECTION INTRAVENOUS; SUBCUTANEOUS at 05:27

## 2022-01-01 RX ADMIN — IPRATROPIUM BROMIDE AND ALBUTEROL SULFATE 1 AMPULE: .5; 2.5 SOLUTION RESPIRATORY (INHALATION) at 11:12

## 2022-01-01 RX ADMIN — Medication 10 ML: at 10:13

## 2022-01-01 RX ADMIN — METOPROLOL SUCCINATE 100 MG: 100 TABLET, EXTENDED RELEASE ORAL at 20:58

## 2022-01-01 RX ADMIN — IPRATROPIUM BROMIDE AND ALBUTEROL SULFATE 3 ML: .5; 2.5 SOLUTION RESPIRATORY (INHALATION) at 10:21

## 2022-01-01 RX ADMIN — HEPARIN SODIUM 5000 UNITS: 10000 INJECTION INTRAVENOUS; SUBCUTANEOUS at 06:47

## 2022-01-01 RX ADMIN — PETROLATUM: 420 OINTMENT TOPICAL at 17:53

## 2022-01-01 RX ADMIN — BUMETANIDE 1 MG: 1 TABLET ORAL at 08:38

## 2022-01-01 RX ADMIN — DILTIAZEM HYDROCHLORIDE 120 MG: 120 CAPSULE, COATED, EXTENDED RELEASE ORAL at 09:49

## 2022-01-01 RX ADMIN — IPRATROPIUM BROMIDE AND ALBUTEROL SULFATE 3 ML: .5; 2.5 SOLUTION RESPIRATORY (INHALATION) at 10:46

## 2022-01-01 RX ADMIN — APIXABAN 2.5 MG: 5 TABLET, FILM COATED ORAL at 20:53

## 2022-01-01 RX ADMIN — HEPARIN SODIUM 5000 UNITS: 10000 INJECTION INTRAVENOUS; SUBCUTANEOUS at 20:53

## 2022-01-01 RX ADMIN — POTASSIUM CHLORIDE 40 MEQ: 1500 TABLET, EXTENDED RELEASE ORAL at 10:11

## 2022-01-01 RX ADMIN — BUMETANIDE 1 MG: 1 TABLET ORAL at 09:16

## 2022-01-01 RX ADMIN — IPRATROPIUM BROMIDE AND ALBUTEROL SULFATE 3 ML: .5; 2.5 SOLUTION RESPIRATORY (INHALATION) at 16:31

## 2022-01-01 RX ADMIN — Medication 10 ML: at 09:45

## 2022-01-01 RX ADMIN — CEFEPIME HYDROCHLORIDE 2000 MG: 2 INJECTION, POWDER, FOR SOLUTION INTRAVENOUS at 22:55

## 2022-01-01 RX ADMIN — APIXABAN 2.5 MG: 5 TABLET, FILM COATED ORAL at 09:22

## 2022-01-01 RX ADMIN — SODIUM CHLORIDE 1000 ML: 9 INJECTION, SOLUTION INTRAVENOUS at 18:49

## 2022-01-01 RX ADMIN — APIXABAN 2.5 MG: 5 TABLET, FILM COATED ORAL at 20:52

## 2022-01-01 RX ADMIN — IPRATROPIUM BROMIDE AND ALBUTEROL SULFATE 3 ML: .5; 2.5 SOLUTION RESPIRATORY (INHALATION) at 19:45

## 2022-01-01 RX ADMIN — HEPARIN SODIUM 5000 UNITS: 10000 INJECTION INTRAVENOUS; SUBCUTANEOUS at 13:31

## 2022-01-01 RX ADMIN — BUMETANIDE 1 MG: 1 TABLET ORAL at 09:49

## 2022-01-01 RX ADMIN — APIXABAN 2.5 MG: 5 TABLET, FILM COATED ORAL at 10:25

## 2022-01-01 RX ADMIN — HEPARIN SODIUM 5000 UNITS: 10000 INJECTION INTRAVENOUS; SUBCUTANEOUS at 23:01

## 2022-01-01 RX ADMIN — DILTIAZEM HYDROCHLORIDE 120 MG: 120 CAPSULE, COATED, EXTENDED RELEASE ORAL at 09:07

## 2022-01-01 RX ADMIN — APIXABAN 2.5 MG: 5 TABLET, FILM COATED ORAL at 08:37

## 2022-01-01 RX ADMIN — IPRATROPIUM BROMIDE AND ALBUTEROL SULFATE 3 ML: .5; 2.5 SOLUTION RESPIRATORY (INHALATION) at 13:39

## 2022-01-01 RX ADMIN — DILTIAZEM HYDROCHLORIDE 120 MG: 120 CAPSULE, COATED, EXTENDED RELEASE ORAL at 09:22

## 2022-01-01 RX ADMIN — Medication 10 ML: at 10:27

## 2022-01-01 RX ADMIN — IPRATROPIUM BROMIDE AND ALBUTEROL SULFATE 1 AMPULE: .5; 2.5 SOLUTION RESPIRATORY (INHALATION) at 09:08

## 2022-01-01 RX ADMIN — LACTULOSE 10 G: 20 SOLUTION ORAL at 23:39

## 2022-01-01 RX ADMIN — LACTULOSE 10 G: 20 SOLUTION ORAL at 08:50

## 2022-01-01 RX ADMIN — METOPROLOL SUCCINATE 100 MG: 100 TABLET, EXTENDED RELEASE ORAL at 00:11

## 2022-01-01 RX ADMIN — METOPROLOL SUCCINATE 100 MG: 100 TABLET, EXTENDED RELEASE ORAL at 20:22

## 2022-01-01 RX ADMIN — HEPARIN SODIUM 5000 UNITS: 10000 INJECTION INTRAVENOUS; SUBCUTANEOUS at 04:41

## 2022-01-01 RX ADMIN — LACTULOSE 10 G: 20 SOLUTION ORAL at 09:16

## 2022-01-01 ASSESSMENT — PAIN SCALES - GENERAL
PAINLEVEL_OUTOF10: 0
PAINLEVEL_OUTOF10: 0
PAINLEVEL_OUTOF10: 7
PAINLEVEL_OUTOF10: 0
PAINLEVEL_OUTOF10: 0
PAINLEVEL_OUTOF10: 8
PAINLEVEL_OUTOF10: 0
PAINLEVEL_OUTOF10: 7
PAINLEVEL_OUTOF10: 8

## 2022-01-01 ASSESSMENT — ENCOUNTER SYMPTOMS
SORE THROAT: 0
EYE PAIN: 0
DIARRHEA: 0
ABDOMINAL DISTENTION: 1
SHORTNESS OF BREATH: 0
WHEEZING: 0
COUGH: 0
CHEST TIGHTNESS: 0
SHORTNESS OF BREATH: 0
SINUS PRESSURE: 0
ABDOMINAL PAIN: 0
NAUSEA: 0
EYE DISCHARGE: 0
EYE REDNESS: 0
BACK PAIN: 0
VOMITING: 0

## 2022-01-01 ASSESSMENT — PAIN DESCRIPTION - LOCATION: LOCATION: HEAD;SCROTUM

## 2022-01-01 NOTE — PROGRESS NOTES
Pulse ox was 94% on room air at rest.  Ambulated patient on room air. Oxygen saturation was 78% on room air while ambulating. Oxygen applied  Recovery pulse ox was 90% on 3 liters of oxygen while ambulating.

## 2022-01-01 NOTE — PROGRESS NOTES
Physical Therapy  Physical Therapy Initial Assessment     Name: Sachi Randle  : 1935  MRN: [id number]      Date of Service: 2022    Evaluating PT:  Nancy Florez PT, VARGHESE [id number]     Room #:  8402/2454-V  Diagnosis:  Compression fracture of first lumbar vertebra with routine healing [S32.010D]  Nontraumatic compression fracture of L1 vertebra, initial encounter [facility]) [X34.26KN]  Reason for admission: back pain  Precautions:  Falls, TLSO, spinal neutral, L1 compression fx  Procedure/Surgery:  None   Equipment Recommendations:  FWW    SUBJECTIVE:  Pt lives with wife in a 1 story home with 5 DENNIS 2 rails. Pt ambulated with no AD but last week or two uses [employer] PRN PTA. OBJECTIVE:   Initial Evaluation  Date:  Treatment   Short Term/ Long Term   Goals   AM-PAC 6 Clicks 85/19     Was pt agreeable to Eval/treatment? Yes      Does pt have pain?  Back pain with movement     Bed Mobility  Rolling: Deb  Supine to sit: ModA  Sit to supine: ModA  Scooting: Deb  Independent    Transfers Sit to stand: Min<>ModA  Stand to sit: Deb  Stand pivot: NT  Independent    Ambulation    10 feet x2 with [employer] Deb    >100 feet with [employer] Mod I    Stair negotiation: ascended and descended  NT  TBD     LE ROM: WFL    LE Strength:   knee ext: 5/5  ankle DF: 5/5    Balance:   Sitting static: Independent  Sitting dynamic: Supervision  Standing static: Deb  Standing dynamic: Deb      -Pt is A & O x 3  -Sensation:  Pt denies numbness and tingling to extremities  -Edema:  unremarkable     Therapeutic Exercises:  Functional activity     Patient education  Pt educated on safety, sequencing of transfers, and role of PT    Patient response to education:   Pt verbalized understanding Pt demonstrated skill Pt requires further education in this area   Yes  Yes  Yes      ASSESSMENT:  Conditions Requiring Skilled Therapeutic Intervention:  [x]Decreased strength     [x]Decreased ROM  [x]Decreased functional mobility  [x]Decreased balance [x]Decreased endurance   [x]Decreased posture  []Decreased sensation  []Decreased coordination   []Decreased vision  [x]Decreased safety awareness   [x]Increased pain       Comments:  Pt received supine in bed and agreeable to PT session   Pt educated on spinal neutral and use of TLSO with good understanding. Required varying level of assist for all mobility. Pt needing extended time d/t slow pace of movement and expressing back pain and SOB which complicated mobility. Ambulation was slow and shuffled <> bathroom with walker. Pt declined sitting in chair d/t SOB and brace causing pain. Pt with all needs met and call light in reach. Pt would benefit from continued PT POC to address functional deficits described above. Treatment:  Patient practiced and was instructed in the following treatment:     Therapeutic activity  o Patient education provided continuously throughout session for sequencing, safety maintenance, and improving any deficits found during the evaluation. o Bed mobility training - pt given verbal and tactile cues to facilitate proper sequencing and safety during rolling and supine>sit as well as provided with physical assistance to complete task    o Sitting EOB for >5 minutes for upright tolerance, postural awareness and BLE ROM   o STS and pivot transfer training - pt educated on proper hand and foot placement, safety and sequencing, and use of proper technique to safely complete sit<>stand and pivot transfers with hands on assistance to complete task safely   o Gait training- pt was given verbal and tactile cues to facilitate improved speed and posture during ambulation as well as provided with physical assistance. Pt's/ family goals   1. Rehab    Patient and or family understand(s) diagnosis, prognosis, and plan of care. yes    Prognosis is good for reaching above PT goals.     PHYSICAL THERAPY PLAN OF CARE:    PT POC is established based on physician order and patient diagnosis Referring provider/PT Order:  *12/30/21 0045   PT evaluation and treat Start: 12/30/21 0045, End: 12/30/21 0045, ONE TIME, Standing Count: 1 Occurrences, R    Skyla Scott, APRN - CNP    Diagnosis:  Compression fracture of first lumbar vertebra with routine healing [S32.010D]  Nontraumatic compression fracture of L1 vertebra[facility] [S18.77JP]  Specific instructions for next treatment:  Progress ambulation. Sitting oob in chair. Current Treatment Recommendations:   [x] Strengthening to improve independence with functional mobility   [x] ROM to improve independence with functional mobility   [x] Balance Training to improve static/dynamic balance and to reduce fall risk  [x] Endurance Training to improve activity tolerance during functional mobility   [x] Transfer Training to improve safety and independence with all functional transfers   [x] Gait Training to improve gait mechanics, endurance and asses need for appropriate assistive device  [] Stair Training in preparation for safe discharge home and/or into the community   [x] Positioning to prevent skin breakdown and contractures  [x] Safety and Education Training   [] Patient/Caregiver Education   [] HEP  [] Other     PT long term treatment goals are located in above grid    Frequency of treatments: 2-5x/week x 1-2 weeks. Time in  1005  Time out  1035    Total Treatment Time  15 minutes     Evaluation Time includes thorough review of current medical information, gathering information on past medical history/social history and prior level of function, completion of standardized testing/informal observation of tasks, assessment of data and education on plan of care and goals.     CPT codes:  [x] Low Complexity PT evaluation 46590  [] Moderate Complexity PT evaluation 32389  [] High Complexity PT evaluation 17492  [] PT Re-evaluation 47910  [] Gait training 84519 - minutes  [] Manual therapy 02698 - minutes  [x] Therapeutic activities

## 2022-01-01 NOTE — PROGRESS NOTES
Occupational Therapy  OCCUPATIONAL THERAPY INITIAL EVALUATION    DAVIN Edge Curis [id number] [place] [address]      Date:2022                                                Patient Name: Mariel Jose  MRN: [id number]  : 1935  Room: [address] [place] [address]    Evaluating OT: Violet Lynne OTR/L #[id number]     Referring Provider: GRACIELA Lizarraga CNP  Specific Provider Orders/Date: OT eval and treat 21    Diagnosis: Compression fracture of first lumbar vertebra with routine healing [S32.010D]  Nontraumatic compression fracture of L1 vertebra, initial encounter ([facility] [M48.56XA]   Pt admitted to hospital for fatigue, weakness, SOB and back pain - finished chemo x1 week ago. L1 compression fracture     Pertinent Medical History:  has a past medical history of A-fib ([facility] AAA (abdominal aortic aneurysm) ([facility] Cancer ([facility] CHF (congestive heart failure) ([facility] COPD (chronic obstructive pulmonary disease) ([facility] Heart failure ([facility] Hyperlipidemia, and Hypertension.        Precautions:  Fall Risk, spinal precautions, TLSO, O2    Assessment of current deficits    [x] Functional mobility  [x]ADLs  [x] Strength               []Cognition    [x] Functional transfers   [x] IADLs         [x] Safety Awareness   [x]Endurance    [] Fine Coordination              [x] Balance      [] Vision/perception   []Sensation     []Gross Motor Coordination  [] ROM  [] Delirium                   [] Motor Control     OT PLAN OF CARE   OT POC based on physician orders, patient diagnosis and results of clinical assessment    Frequency/Duration 1-3 days/wk for 2 weeks PRN   Specific OT Treatment Interventions to include:   * Instruction/training on adapted ADL techniques and AE recommendations to increase functional independence within precautions       * Training on energy conservation strategies, correct breathing pattern and techniques to improve independence/tolerance for self-care routine  * Functional transfer/mobility training/DME recommendations for increased independence, safety, and fall prevention  * Patient/Family education to increase follow through with safety techniques and functional independence  * Recommendation of environmental modifications for increased safety with functional transfers/mobility and ADLs  * Therapeutic exercise to improve motor endurance, ROM, and functional strength for ADLs/functional transfers  * Therapeutic activities to facilitate/challenge dynamic balance, stand tolerance for increased safety and independence with ADLs  * Therapeutic activities to facilitate gross/fine motor skills for increased independence with ADLs  * Neuro-muscular re-education: facilitation of righting/equilibrium reactions, midline orientation, scapular stability/mobility, normalization of muscle tone, and facilitation of volitional active controled movement    Recommended Adaptive Equipment:  TBD     Home Living: Pt lives with wife in a 1 story home with 5 DENNIS and B hand rails.     Bathroom setup: tub/shower combo    Equipment owned: shower chair, w/w    Prior Level of Function: Independent with ADLs , Independent with IADLs; ambulated without AD (utilizing w/w for past week prn)   Driving: yes   Occupation: retired    Pain Level: Pt denies pain this session    Cognition: A&O: 4/4; Follows 2 step directions   Memory:  good   Sequencing:  good   Problem solving:  fair   Judgement/safety:  fair     Functional Assessment:  AM-PAC Daily Activity Raw Score: 14/24   Initial Eval Status  Date: 1/1/22 Treatment Status  Date: STGs = LTGs  Time frame: 10-14 days   Feeding Independent      Grooming Minimal Assist     Standing grooming tasks   Modified Simpson    UB Dressing Minimal Assist   Gown     Max A  TLSO  Supervision         Min A  TLSO   LB Dressing Dependent  Minimal Assist    Bathing Maximal Assist  Minimal Assist    Toileting Moderate Assist   Minimal Assist    Bed Mobility  Supine to sit: Moderate Assist   Sit to supine: Moderate Assist   Supine to sit: Stand by Assist   Sit to supine: Stand by Assist    Functional Transfers Moderate Assist   Stand by Assist    Functional Mobility Minimal Assist     Ambulated to/from bathroom with w/w  Stand by Assist    Balance Sitting:     Static:  SBA    Dynamic:SBA  Standing: min A     Activity Tolerance F  G   Visual/  Perceptual Glasses: yes  wfl                    Hand Dominance right   Strength ROM Additional Info:    RUE   4/5 wfl good  and wfl FMC/dexterity noted during ADL tasks     LUE 4/5 wfl good  and wfl FMC/dexterity noted during ADL tasks     Hearing: wfl  Sensation:wfl  Tone: wfl  Edema:none noted     Comments: Upon arrival patient supine in bed and agreeable to OT Session. Therapist educated pt on role of OT. At end of session, patient semi-supine in bed with call light and phone within reach, all lines and tubes intact. Overall patient demonstrated decreased independence and safety during completion of ADL/functional transfer/mobility tasks. Pt would benefit from continued skilled OT to increase safety and independence with completion of ADL/IADL tasks for functional independence and quality of life. Treatment: OT treatment provided this date includes: Therapist educated pt regarding role of OT, spinal precautions and TLSO. Therapist facilitated donning/doffing of TLSO, bed mobility utilizing log roll technique, unsupported sitting balance at EOB, functional transfers (various surfaces: bed, toilet, chair), standing balance tasks (addressing posture, weight shifting and reaching) and functional ambulation with w/w (including to/from bathroom and in room; in preparation for item retrieval tasks) - skilled cuing on sequencing, hand placement, body mechanics and safety.   Therapist facilitated self-care retraining: UB/LB self-care tasks (gown, TLSO, socks), toileting task and standing grooming task while educating pt on modified techniques within precautions, posture, safety and energy conservation techniques. Therapist provided handout on precautions / ADLs. Skilled monitoring of HR, O2 sats and pts response to treatment. Rehab Potential: Good for established goals     Patient / Family Goal: return home      Patient and/or family were instructed on functional diagnosis, prognosis/goals and OT plan of care. Demonstrated fair understanding. Eval Complexity: Low    Time In: 1000  Time Out: 1038  Total Treatment Time: 23 minutes    Min Units   OT Eval Low 97165  x  1   OT Eval Medium 03128      OT Eval High 41602      OT Re-Eval [id number]       Therapeutic Ex 38975       Therapeutic Activities 63237  11  1   ADL/Self Care 09715  12  1   Orthotic Management 01364       Manual [id number]     Neuro Re-Ed [id number]       Non-Billable Time          Evaluation Time additionally includes thorough review of current medical information, gathering information on past medical history/social history and prior level of function, interpretation of standardized testing/informal observation of tasks, assessment of data and development of plan of care and goals.           Violet Lynne OTR/L #[id number]

## 2022-01-01 NOTE — PROGRESS NOTES
Department of Neurosurgery  Progress Note    CHIEF COMPLAINT: L1 compression fracture    SUBJECTIVE:  No new complaints overnight     REVIEW OF SYSTEMS :  Constitutional: Negative for chills and fever. Neurological: Negative for dizziness, tremors and speech change.      OBJECTIVE:   VITALS:  /86   Pulse 86   Temp 97.6 °F (36.4 °C) (Temporal)   Resp 19   Ht 5' 9\" (1.753 m)   Wt 147 lb 1.6 oz (66.7 kg)   SpO2 94%   BMI 21.72 kg/m²   PHYSICAL:  Neurologic:  Mental Status Exam:  Level of Alertness:   awake  Orientation:   person, place, time  Motor Exam:  Motor exam is symmetrical 5 out of 5 all extremities bilaterally  Sensory:  Sensory intact    DATA:  CBC:   Lab Results   Component Value Date    WBC 9.8 12/30/2021    RBC 3.84 12/30/2021    HGB 12.1 12/30/2021    HCT 36.2 12/30/2021    MCV 94.3 12/30/2021    MCH 31.5 12/30/2021    MCHC 33.4 12/30/2021    RDW 18.7 12/30/2021     12/30/2021    MPV 10.7 12/30/2021     BMP:    Lab Results   Component Value Date     12/30/2021    K 4.6 12/30/2021     12/30/2021    CO2 25 12/30/2021    BUN 51 12/30/2021    LABALBU 2.9 12/30/2021    CREATININE 1.8 12/30/2021    CALCIUM 8.9 12/30/2021    GFRAA 44 12/30/2021    LABGLOM 36 12/30/2021    GLUCOSE 98 12/30/2021     PT/INR:    Lab Results   Component Value Date    PROTIME 24.2 12/30/2021    INR 2.2 12/30/2021     PTT:    Lab Results   Component Value Date    APTT 30.8 12/30/2021   [APTT}    Current Inpatient Medications  Current Facility-Administered Medications: atorvastatin (LIPITOR) tablet 10 mg, 10 mg, Oral, Every Other Day  bumetanide (BUMEX) tablet 1 mg, 1 mg, Oral, Daily  dilTIAZem (CARDIZEM CD) extended release capsule 120 mg, 120 mg, Oral, Daily  isosorbide mononitrate (IMDUR) extended release tablet 60 mg, 60 mg, Oral, Daily  lactulose (CHRONULAC) 10 GM/15ML solution 10 g, 10 g, Oral, BID  metoprolol succinate (TOPROL XL) extended release tablet 100 mg, 100 mg, Oral, BID  sodium chloride flush 0.9 % injection 5-40 mL, 5-40 mL, IntraVENous, 2 times per day  sodium chloride flush 0.9 % injection 5-40 mL, 5-40 mL, IntraVENous, PRN  0.9 % sodium chloride infusion, 25 mL, IntraVENous, PRN  ondansetron (ZOFRAN-ODT) disintegrating tablet 4 mg, 4 mg, Oral, Q8H PRN **OR** ondansetron (ZOFRAN) injection 4 mg, 4 mg, IntraVENous, Q6H PRN  acetaminophen (TYLENOL) tablet 650 mg, 650 mg, Oral, Q6H PRN **OR** acetaminophen (TYLENOL) suppository 650 mg, 650 mg, Rectal, Q6H PRN  potassium chloride (KLOR-CON M) extended release tablet 40 mEq, 40 mEq, Oral, PRN **OR** potassium bicarb-citric acid (EFFER-K) effervescent tablet 40 mEq, 40 mEq, Oral, PRN **OR** potassium chloride 10 mEq/100 mL IVPB (Peripheral Line), 10 mEq, IntraVENous, PRN  senna (SENOKOT) tablet 8.6 mg, 1 tablet, Oral, Daily PRN  oxyCODONE (ROXICODONE) immediate release tablet 5 mg, 5 mg, Oral, Q6H PRN  ipratropium-albuterol (DUONEB) nebulizer solution 1 ampule, 1 ampule, Inhalation, Q4H WA  apixaban (ELIQUIS) tablet 2.5 mg, 2.5 mg, Oral, BID    ASSESSMENT:   · 82 y/o male with L1 compression fracture. Stable    PLAN:  · No further neurosurgical recommendations at this time  · Upright x-rays stable  · Wear brace when > than 45 degrees  · Follow up in clinic in 1 month with lumbar x-rays.    · Please call with any questions/concerns      Electronically signed by DEBBIE Mccall on 1/1/2022 at 10:28 AM

## 2022-01-01 NOTE — PROGRESS NOTES
Harry Card MD, FACP                   Patient Name: Flor Beatty                   Age:  81 y.o. Gender:   male    CC: Back pain    HPI: History obtained from multiple sources, chart review patient interview. This 25-year-old male with a history of lung cancer and chemotherapy coupled with radiation therapy presents with complaint of low back pain. There is no specific trauma associated with this however he stated he was bending down when he had the onset of pain about a week ago. He actually denies shortness of breath but is significantly dyspneic on speech.     Initial emergency room evaluation:  BUN 35 creatinine 1.9  High-sensitivity troponin was elevated but delta troponin was negative  INR 2.2    Lumbar spine CT:  New compression fracture L1 vertebral body  Moderate spinal stenosis  Increasing abdominal aortic aneurysm to 5.4 cm    Chest x-ray post left thoracotomy  Right suprahilar mass unchanged  Worsening appearance of metastatic lung disease  Vitals appear to be normal  Patient is on 2 L nasal cannula saturating at 99% although dyspneic on speech    12/31  Neurosurgical evaluation:  Pain control and brace  Patient stated his breathing is at baseline  Labs are stable  CKD appears a bit worse    1/1/2022  Patient is stable   his brace Has been placed he says it is extremely tight   He has not had a physical therapy evaluation following the brace  We are awaiting his evaluation in order to see his next venue of care  Labs have not been updated  Vitals are normal and he remains on 2 L nasal cannula saturating at 94%   he denies feeling any shortness of breath and feels that he is at baseline            Past Medical History:   Diagnosis Date    A-fib [facility])     AAA (abdominal aortic aneurysm) ([facility]     measures 5.0 cm    Cancer ([facility] 2005    left lung    CHF (congestive heart failure) (HCC)     COPD (chronic obstructive pulmonary disease) (HCC)     Heart failure (HCC)     Hyperlipidemia     Hypertension        Past Surgical History:   Procedure Laterality Date    BRONCHOSCOPY N/A 9/1/2021    BRONCHOSCOPY W/EBUS FNA performed by Allie Segovia MD at [address] N/A 9/1/2021    BRONCHOSCOPY ADD ON COMPUTER ASSISTED performed by Allie Segovia MD at [address]  9/1/2021    BRONCHOSCOPY/TRANSBRONCHIAL NEEDLE BIOPSY performed by Allie Segovia MD at [address]  9/1/2021    BRONCHOSCOPY ALVEOLAR LAVAGE performed by Allie Segovia MD at [address] TEST N/A 06/16/2021    Lexiscan stress test    COLONOSCOPY      years ago    CT NEEDLE BIOPSY LUNG PERCUTANEOUS  7/1/2021    CT NEEDLE BIOPSY LUNG PERCUTANEOUS 7/1/2021 Tomasita Aschoff, MD [facility]    LUNG CANCER SURGERY  01/01/2005       No Known Allergies    The patient's medical records have been reviewed contingent on availability    Review of Systems:   · General: Some malaise and weakness noted denies fever or chills. · As per HPI      Physical Examination:      Wt Readings from Last 3 Encounters:   01/01/22 147 lb 1.6 oz (66.7 kg)   12/28/21 150 lb (68 kg)   12/15/21 151 lb (68.5 kg)     Temp Readings from Last 3 Encounters:   01/01/22 97.6 °F (36.4 °C) (Temporal)   12/28/21 97 °F (36.1 °C) (Infrared)   12/15/21 97.1 °F (36.2 °C) (Skin)     BP Readings from Last 3 Encounters:   01/01/22 115/86   12/28/21 119/71   12/15/21 (!) 148/68     Pulse Readings from Last 3 Encounters:   01/01/22 86   12/28/21 111   12/15/21 87       General appearance: Frail appearing awake, alert no distress. Less Dyspneic on speech  Skin: Color, texture, turgor normal. No rashes or lesions. Eyes: Conjunctivae/cornea clear. Marvia Lease. Sclera non icteric. Neck:  Symmetric. No adenopathy.    Lungs: Harsh breath sounds bilaterally, no evidence of bronchospasm, no evidence of focal consolidation on examination --- current oxygen saturation on 2 L measured at bedside 95%  Heart: S1 > S2. Rhythm is regular and rate is normal. No gallop rub or murmur. Extremities: No deformities, edema, or skin discoloration. Musculoskeletal: As noted above low back pain reproducible by percussion  Neuro:   · Grossly normal  Mental status: Awake, alert, cognizant of person, place, time. Patient appears capable of directing self care   Mood: Normal and appropriate affect  Gait & balance: not assessed: Assisted     Labs     CBC:   Lab Results   Component Value Date    WBC 9.8 12/30/2021    RBC 3.84 12/30/2021    HGB 12.1 12/30/2021    HCT 36.2 12/30/2021     12/30/2021    MCV 94.3 12/30/2021     BMP:    Lab Results   Component Value Date     12/30/2021    K 4.6 12/30/2021     12/30/2021    CO2 25 12/30/2021    BUN 51 12/30/2021    CREATININE 1.8 12/30/2021    GLUCOSE 98 12/30/2021    CALCIUM 8.9 12/30/2021     Hepatic Function Panel:    Lab Results   Component Value Date    ALKPHOS 88 12/30/2021    AST 21 12/30/2021    ALT 21 12/30/2021    PROT 5.1 12/30/2021    LABALBU 2.9 12/30/2021    BILITOT 1.3 12/30/2021     Magnesium:    Lab Results   Component Value Date    MG 2.1 11/08/2021     Cardiac Enzymes:   Lab Results   Component Value Date    CKTOTAL 70 03/18/2016    CKMB 2.7 03/18/2016    TROPONINI <0.01 03/18/2016     LDH:  No results found for: LDH  PT/INR:    Lab Results   Component Value Date    PROTIME 24.2 12/30/2021    INR 2.2 12/30/2021     BNP: No results for input(s): BNP in the last 72 hours. TSH:   Lab Results   Component Value Date    TSH 3.130 06/16/2021      Cardiac Injury Profile: No results for input(s): CKTOTAL, CKMB, CKMBINDEX, TROPONINI in the last 72 hours.    Lipid Profile:   Lab Results   Component Value Date    TRIG 68 06/25/2021    HDL 38 06/25/2021    LDLCALC 43 06/25/2021    CHOL 95 06/25/2021      Hemoglobin A1C: No components found for: HGBA1C   U/A:   Lab Results   Component Value Date    LEUKOCYTESUR Negative 12/29/2021    PHUR 5.5 12/29/2021    WBCUA 0-1 06/15/2021    RBCUA 0-1 06/15/2021    BACTERIA RARE 06/15/2021    SPECGRAV 1.020 12/29/2021    BLOODU Negative 12/29/2021    GLUCOSEU Negative 12/29/2021         ADMISSION SCHEDULED MEDS:   Current Facility-Administered Medications   Medication Dose Route Frequency Provider Last Rate Last Admin    atorvastatin (LIPITOR) tablet 10 mg  10 mg Oral Every Other Day Elly Ham, APRN - CNP   10 mg at 01/01/22 0916    bumetanide (BUMEX) tablet 1 mg  1 mg Oral Daily Elly Ham, APRN - CNP   1 mg at 01/01/22 0916    dilTIAZem (CARDIZEM CD) extended release capsule 120 mg  120 mg Oral Daily Elly Ham, APRN - CNP   120 mg at 01/01/22 0916    isosorbide mononitrate (IMDUR) extended release tablet 60 mg  60 mg Oral Daily Elly Ham, APRN - CNP   60 mg at 01/01/22 0916    lactulose (CHRONULAC) 10 GM/15ML solution 10 g  10 g Oral BID Elly Ham, APRN - CNP   10 g at 01/01/22 0916    metoprolol succinate (TOPROL XL) extended release tablet 100 mg  100 mg Oral BID Elly Ham, APRN - CNP   100 mg at 01/01/22 0916    sodium chloride flush 0.9 % injection 5-40 mL  5-40 mL IntraVENous 2 times per day Elly Ham, APRN - CNP   10 mL at 01/01/22 0915    sodium chloride flush 0.9 % injection 5-40 mL  5-40 mL IntraVENous PRN Elly Ham, APRN - CNP        0.9 % sodium chloride infusion  25 mL IntraVENous PRN Elly Ham, APRN - CNP        ondansetron (ZOFRAN-ODT) disintegrating tablet 4 mg  4 mg Oral Q8H PRN Elly Ham, APRN - CNP        Or    ondansetron (ZOFRAN) injection 4 mg  4 mg IntraVENous Q6H PRN Elly Ham, APRN - CNP        acetaminophen (TYLENOL) tablet 650 mg  650 mg Oral Q6H PRN Elly Ham, APRN - CNP   650 mg at 12/31/21 2047    Or    acetaminophen (TYLENOL) suppository 650 mg  650 mg Rectal Q6H PRN Elly Trejo, APRN - CNP        potassium chloride (KLOR-CON M) extended release tablet 40 mEq  40 mEq Oral PRN [place] Kaitlin, APRN - CNP        Or    potassium bicarb-citric acid (EFFER-K) effervescent tablet 40 mEq  40 mEq Oral PRN [place] Kaitlin, APRN - CNP        Or    potassium chloride 10 mEq/100 mL IVPB (Peripheral Line)  10 mEq IntraVENous PRN Simon Kaitlin, APRN - CNP        senna (SENOKOT) tablet 8.6 mg  1 tablet Oral Daily PRN [place] Kaitlin, APRN - CNP   8.6 mg at 01/01/22 0916    oxyCODONE (ROXICODONE) immediate release tablet 5 mg  5 mg Oral Q6H PRN Simon Kaitlin, APRN - CNP   5 mg at 12/30/21 2007    ipratropium-albuterol (DUONEB) nebulizer solution 1 ampule  1 ampule Inhalation Q4H WA Sathish Powell MD   1 ampule at 12/31/21 1750    apixaban (ELIQUIS) tablet 2.5 mg  2.5 mg Oral BID Sathish Powell MD   2.5 mg at 01/01/22 5855       Current  Infusions   sodium chloride         Prn Meds  sodium chloride flush, sodium chloride, ondansetron **OR** ondansetron, acetaminophen **OR** acetaminophen, potassium chloride **OR** potassium alternative oral replacement **OR** potassium chloride, senna, oxyCODONE    Radiology Review:  XR LUMBAR SPINE (2-3 VIEWS)   Final Result   Redemonstration of an acute or subacute compression fracture of L1 with   estimated 25% vertebral body height loss. No significant change in alignment. Multilevel spondylosis. Atherosclerosis of an abdominal aortic aneurysm. See recent CT from   12/29/2021 for more accurate assessment of the abdominal aorta and   recommendations. CT CHEST WO CONTRAST   Final Result   1. New multifocal sub solid consolidations bilaterally. Findings favor   multifocal pneumonia, although recurrent metastatic disease is not excluded. Recommend attention on follow-up exam.   2. Background of moderate emphysema.    3. Osteoporotic compression fracture of L1 with estimated 25% vertebral body   height loss is new since June 2021 and otherwise age indeterminate. No   significant bony retropulsion. RECOMMENDATIONS:   Unavailable         CT LUMBAR SPINE WO CONTRAST   Final Result   1. New acute appearing compression fracture of the L1 vertebral body   2. Degenerative spondylolisthesis at L2-L3 causing moderate spinal stenosis   3. Degenerative disc disease at L4-L5, stable   4. Increased size of the abdominal aorta aneurysm, measuring      RECOMMENDATIONS:   Fusiform AAA Size:    Follow-up Recommendation1:      2.6-2.9 cm     Every 5 years 2      3.0-3.4 cm       Every 3 years      3.5-3.9 cm      Every 2 years      4.0-4.4 cm     Every 12 months,  recommend vascular consultation      4.5-5.4 cm     Every 6 months,  recommend vascular consultation      >5.5 cm        Referral to vascular specialist   ______________________________________________________________________________   _______ 1 Based on ACR White paper: IJ am Laura Radioli 2013;10:789-794.  2   For aortas of maximum diameter 2.6--2.9 cm meeting the criteria for AAA (>1.5   x proximal normal segment; no f/u if < 1.5 x proximal normal segment; no f/u   for aortas < 2.6 cm)      Note:  AAA enlargement of >0.5 cm in 6 months or >1 cm in 1 year, recommend   vascular consultation Note: Saccular AAA of any size, recommend vascular   consultation         XR CHEST (2 VW)   Final Result   Left post thoracotomy change with ipsilateral volume loss. Right suprahilar   known mass, similar to prior. Separately, both lungs show worsening   opacities which may reflect worsening metastatic lung disease.                ASSESSMENT:  Active diagnoses treated at this admission:  · Acute L1 fracture  · Low back pain  · Metastatic lung cancer: Recent chemotherapy and radiation therapy completed  · History of COPD  · History of chronic congestive heart failure no evidence of current decompensation  · Atrial fibrillation currently rate controlled telemetry and ECG reviewed  · CKD    Problem list:  Patient Active Problem List   Diagnosis    [facility]    Hyperlipidemia LDL goal <100    Essential hypertension    Congestive heart failure (CHF) (HCC)    Lung mass    Atrial fibrillation (HCC)    Moderate protein-calorie malnutrition (HCC)    Abdominal aortic aneurysm (AAA) without rupture (HCC)    Chronic systolic congestive heart failure (HCC)    Non-small cell cancer of right lung (HCC)    Malignant neoplasm of lung (HCC)    Compression fracture of first lumbar vertebra with routine healing    Abnormal WBC count - Low blast count noted       PLAN:  Anticoagulation he restarted  Continue diuretic therapy reassess metabolic panel-and renal function  Pain control is adequate  Consultation to neurosurgery reviewed awair brace then eval for mobility  Will require DVT prophylaxis  We will order straight aerosol treatments  Administer single dose of steroid in light of his history  In light of his current presentation consider further diuresis elevated BNP may be associated with lung findings  Monitor electrolytes closely monitor hydration closely  Await PT/OT assessment for next venue of care-his discharge is being delayed because there is no follow-up PT evaluation and recommendation    See  Orders  Sylvia Villalba MD, [address]  9:44 AM  1/1/2022

## 2022-01-02 NOTE — PROGRESS NOTES
Linda Brown MD, FACP                   Patient Name: Mindy Medina                   Age:  81 y.o. Gender:   male    CC: Back pain    HPI: History obtained from multiple sources, chart review patient interview. This 81-year-old male with a history of lung cancer and chemotherapy coupled with radiation therapy presents with complaint of low back pain. There is no specific trauma associated with this however he stated he was bending down when he had the onset of pain about a week ago. He actually denies shortness of breath but is significantly dyspneic on speech.     Initial emergency room evaluation:  BUN 35 creatinine 1.9  High-sensitivity troponin was elevated but delta troponin was negative  INR 2.2    Lumbar spine CT:  New compression fracture L1 vertebral body  Moderate spinal stenosis  Increasing abdominal aortic aneurysm to 5.4 cm    Chest x-ray post left thoracotomy  Right suprahilar mass unchanged  Worsening appearance of metastatic lung disease  Vitals appear to be normal  Patient is on 2 L nasal cannula saturating at 99% although dyspneic on speech    12/31  Neurosurgical evaluation:  Pain control and brace  Patient stated his breathing is at baseline  Labs are stable  CKD appears a bit worse    1/1/2022  Patient is stable   his brace Has been placed he says it is extremely tight   He has not had a physical therapy evaluation following the brace  We are awaiting his evaluation in order to see his next venue of care  Labs have not been updated  Vitals are normal and he remains on 2 L nasal cannula saturating at 94%   he denies feeling any shortness of breath and feels that he is at baseline    01/02/2022  Patient states his back feels sore  He has difficulty ambulating  He also requires 2 L nasal cannula  He denies any chest pain or pressure sensation      Past Medical History:   Diagnosis Date    A-fib [facility])     AAA [facility]abdominal aortic aneurysm) ([facility]     measures 5.0 cm    Cancer ([facility] 2005    left lung    CHF (congestive heart failure) ([facility]     COPD (chronic obstructive pulmonary disease) (HCC)     Heart failure ([facility]     Hyperlipidemia     Hypertension        Past Surgical History:   Procedure Laterality Date    BRONCHOSCOPY N/A 9/1/2021    BRONCHOSCOPY W/EBUS FNA performed by Chris Medina MD at [address] [place] [address] N/A 9/1/2021    BRONCHOSCOPY ADD ON COMPUTER ASSISTED performed by Chris Medina MD at [address] [place] [address]  9/1/2021    BRONCHOSCOPY/TRANSBRONCHIAL NEEDLE BIOPSY performed by Chris Medina MD at [address] [place] [address]  9/1/2021    BRONCHOSCOPY ALVEOLAR LAVAGE performed by Chris Medina MD at [address] TEST N/A 06/16/2021    Lexiscan stress test    COLONOSCOPY      years ago    CT NEEDLE BIOPSY LUNG PERCUTANEOUS  7/1/2021    CT NEEDLE BIOPSY LUNG PERCUTANEOUS 7/1/2021 Deirdre Dias MD [facility] CT    LUNG CANCER SURGERY  01/01/2005       No Known Allergies    The patient's medical records have been reviewed contingent on availability    Review of Systems:   · General: Some malaise and weakness noted denies fever or chills. · As per HPI      Physical Examination:      Wt Readings from Last 3 Encounters:   01/02/22 151 lb (68.5 kg)   12/28/21 150 lb (68 kg)   12/15/21 151 lb (68.5 kg)     Temp Readings from Last 3 Encounters:   01/02/22 96.3 °F (35.7 °C) (Temporal)   12/28/21 97 °F (36.1 °C) (Infrared)   12/15/21 97.1 °F (36.2 °C) (Skin)     BP Readings from Last 3 Encounters:   01/02/22 118/78   12/28/21 119/71   12/15/21 (!) 148/68     Pulse Readings from Last 3 Encounters:   01/02/22 74   12/28/21 111   12/15/21 87       General appearance: Frail appearing awake, alert no distress.   Less Dyspneic on speech  Skin: Color, texture, turgor normal. No rashes or lesions. Eyes: Conjunctivae/cornea clear. Felix Eric. Sclera non icteric. Neck:  Symmetric. No adenopathy. Lungs: Breath sounds are harsh but clear, no evidence of focal abnormality, reduced ventilation associated with positioning and some back pain  Heart: S1 > S2. Rhythm is regular and rate is normal. No gallop rub or murmur. Extremities: No deformities, edema, or skin discoloration. Musculoskeletal: As noted above low back pain reproducible by percussion  Neuro:   · Grossly normal  Mental status: Awake, alert, cognizant of person, place, time. Patient appears capable of directing self care   Mood: Normal and appropriate affect  Gait & balance: not assessed: Assisted     Labs     CBC:   Lab Results   Component Value Date    WBC 9.8 12/30/2021    RBC 3.84 12/30/2021    HGB 12.1 12/30/2021    HCT 36.2 12/30/2021     12/30/2021    MCV 94.3 12/30/2021     BMP:    Lab Results   Component Value Date     01/01/2022    K 4.4 01/01/2022    CL 97 01/01/2022    CO2 28 01/01/2022    BUN 67 01/01/2022    CREATININE 2.1 01/01/2022    GLUCOSE 120 01/01/2022    CALCIUM 8.9 01/01/2022     Hepatic Function Panel:    Lab Results   Component Value Date    ALKPHOS 88 12/30/2021    AST 21 12/30/2021    ALT 21 12/30/2021    PROT 5.1 12/30/2021    LABALBU 2.9 12/30/2021    BILITOT 1.3 12/30/2021     Magnesium:    Lab Results   Component Value Date    MG 2.1 11/08/2021     Cardiac Enzymes:   Lab Results   Component Value Date    CKTOTAL 70 03/18/2016    CKMB 2.7 03/18/2016    TROPONINI <0.01 03/18/2016     LDH:  No results found for: LDH  PT/INR:    Lab Results   Component Value Date    PROTIME 24.2 12/30/2021    INR 2.2 12/30/2021     BNP: No results for input(s): BNP in the last 72 hours. TSH:   Lab Results   Component Value Date    TSH 3.130 06/16/2021      Cardiac Injury Profile: No results for input(s): CKTOTAL, CKMB, CKMBINDEX, TROPONINI in the last 72 hours.    Lipid Profile:   Lab Results   Component Value Date    TRIG 68 06/25/2021    HDL 38 06/25/2021    LDLCALC 43 06/25/2021    CHOL 95 06/25/2021      Hemoglobin A1C: No components found for: HGBA1C   U/A:   Lab Results   Component Value Date    LEUKOCYTESUR Negative 12/29/2021    PHUR 5.5 12/29/2021    WBCUA 0-1 06/15/2021    RBCUA 0-1 06/15/2021    BACTERIA RARE 06/15/2021    SPECGRAV 1.020 12/29/2021    BLOODU Negative 12/29/2021    GLUCOSEU Negative 12/29/2021         ADMISSION SCHEDULED MEDS:   Current Facility-Administered Medications   Medication Dose Route Frequency Provider Last Rate Last Admin    atorvastatin (LIPITOR) tablet 10 mg  10 mg Oral Every Other Day GRACIELA Sanchez - CNP   10 mg at 01/01/22 0916    bumetanide (BUMEX) tablet 1 mg  1 mg Oral Daily GRACIELA Sanchez - CNP   1 mg at 01/02/22 9282    dilTIAZem (CARDIZEM CD) extended release capsule 120 mg  120 mg Oral Daily López Infante APRN - CNP   120 mg at 01/02/22 5730    isosorbide mononitrate (IMDUR) extended release tablet 60 mg  60 mg Oral Daily GRACIELA Sanchez - CNP   60 mg at 01/02/22 0922    lactulose (CHRONULAC) 10 GM/15ML solution 10 g  10 g Oral BID López Infante APRN - CNP   10 g at 01/01/22 0916    metoprolol succinate (TOPROL XL) extended release tablet 100 mg  100 mg Oral BID López Infante APRN - CNP   100 mg at 01/02/22 9170    sodium chloride flush 0.9 % injection 5-40 mL  5-40 mL IntraVENous 2 times per day GRACIELA Sanchez - CNP   10 mL at 01/02/22 0924    sodium chloride flush 0.9 % injection 5-40 mL  5-40 mL IntraVENous PRN GRACIELA Sanchez - CNP        0.9 % sodium chloride infusion  25 mL IntraVENous PRN GRACIELA Sanchez - CNP        ondansetron (ZOFRAN-ODT) disintegrating tablet 4 mg  4 mg Oral Q8H PRN López Infante, APRN - CNP        Or    ondansetron (ZOFRAN) injection 4 mg  4 mg IntraVENous Q6H PRN López Infante, APRN - CNP        acetaminophen (TYLENOL) tablet 650 mg  650 mg Oral Q6H PRN López Naresh, APRN emphysema. 3. Osteoporotic compression fracture of L1 with estimated 25% vertebral body   height loss is new since June 2021 and otherwise age indeterminate. No   significant bony retropulsion. RECOMMENDATIONS:   Unavailable         CT LUMBAR SPINE WO CONTRAST   Final Result   1. New acute appearing compression fracture of the L1 vertebral body   2. Degenerative spondylolisthesis at L2-L3 causing moderate spinal stenosis   3. Degenerative disc disease at L4-L5, stable   4. Increased size of the abdominal aorta aneurysm, measuring      RECOMMENDATIONS:   Fusiform AAA Size:    Follow-up Recommendation1:      2.6-2.9 cm     Every 5 years 2      3.0-3.4 cm       Every 3 years      3.5-3.9 cm      Every 2 years      4.0-4.4 cm     Every 12 months,  recommend vascular consultation      4.5-5.4 cm     Every 6 months,  recommend vascular consultation      >5.5 cm        Referral to vascular specialist   ______________________________________________________________________________   _______ 1 Based on ACR White paper: IJ am Laura Radioli 2013;10:[id number].  2   For aortas of maximum diameter 2.6--2.9 cm meeting the criteria for AAA (>1.5   x proximal normal segment; no f/u if < 1.5 x proximal normal segment; no f/u   for aortas < 2.6 cm)      Note:  AAA enlargement of >0.5 cm in 6 months or >1 cm in 1 year, recommend   vascular consultation Note: Saccular AAA of any size, recommend vascular   consultation         XR CHEST (2 VW)   Final Result   Left post thoracotomy change with ipsilateral volume loss. Right suprahilar   known mass, similar to prior. Separately, both lungs show worsening   opacities which may reflect worsening metastatic lung disease.                ASSESSMENT:  Active diagnoses treated at this admission:  · Acute L1 fracture  · Low back pain  · Metastatic lung cancer: Recent chemotherapy and radiation therapy completed  · History of COPD  · History of chronic congestive heart failure no evidence of current decompensation  · Atrial fibrillation currently rate controlled telemetry and ECG reviewed  · CKD    Problem list:  Patient Active Problem List   Diagnosis    [facility]    Hyperlipidemia LDL goal <100    Essential hypertension    Congestive heart failure (CHF) (HCC)    Lung mass    Atrial fibrillation (HCC)    Moderate protein-calorie malnutrition ([facility]    Abdominal aortic aneurysm (AAA) without rupture ([facility]    Chronic systolic congestive heart failure (HCC)    Non-small cell cancer of right lung (HCC)    Malignant neoplasm of lung (HCC)    Compression fracture of first lumbar vertebra with routine healing    Abnormal WBC count - Low blast count noted       PLAN:  Anticoagulation he restarted  Continue diuretic therapy reassess metabolic panel-and renal function  Pain control is adequate  Consultation to neurosurgery reviewed awair brace then eval for mobility  Will require DVT prophylaxis  We will order straight aerosol treatments  Administer single dose of steroid in light of his history  Patient has a history of lung mass and malignant neoplasm of the lung  CT scan reviewed-he completed chemotherapy on 12/14/2021 and radiation therapy on 12/20/2021  Monitor electrolytes closely monitor hydration closely  Reviewed PT and OT evaluations    Will attempt discharge today patient may require oxygen at home  He will require follow-up with oncology as well    See  Orders  Deonte Cantu MD, [address]  9:52 AM  1/2/2022

## 2022-01-03 NOTE — PROGRESS NOTES
Physician Progress Note      Aime Valle  [facility] #:                  [id number]  :                       1935  ADMIT DATE:       2021 7:01 PM  [address] DATE:  RESPONDING  PROVIDER #:        Echo Danielson MD        QUERY TEXT:    Stage of Chronic Kidney Disease: Please provide further specificity, if known. Clinical indicators include: bun, creatinine, ckd, bnp  Options provided:  -- Chronic kidney disease stage 1  -- Chronic kidney disease stage 2  -- Chronic kidney disease stage 3  -- Chronic kidney disease stage 3a  -- Chronic kidney disease stage 3b  -- Chronic kidney disease stage 4  -- Chronic kidney disease stage 5  -- Chronic kidney disease stage 5, requiring dialysis  -- End stage renal disease  -- Other - I will add my own diagnosis  -- Disagree - Not applicable / Not valid  -- Disagree - Clinically Unable to determine / Unknown        PROVIDER RESPONSE TEXT:    The patient has chronic kidney disease stage 3b.       Electronically signed by:  Echo Danielson MD 1/3/2022 10:59 AM

## 2022-01-03 NOTE — PROGRESS NOTES
Attempted to call Dr. Tin Hull regarding patient's continued need for oxygen and need for rehab at discharge. Perfect serve number connected to a pharmacy. Unable to reach Dr. Tin Hull at this time. Will attempt again at a another time.

## 2022-01-03 NOTE — PROGRESS NOTES
Angel Lynch MD, FACP                   Patient Name: Natalia Roberson                   Age:  81 y.o. Gender:   male    CC: Back pain    HPI: History obtained from multiple sources, chart review patient interview. This 44-year-old male with a history of lung cancer and chemotherapy coupled with radiation therapy presents with complaint of low back pain. There is no specific trauma associated with this however he stated he was bending down when he had the onset of pain about a week ago. He actually denies shortness of breath but is significantly dyspneic on speech.     Initial emergency room evaluation:  BUN 35 creatinine 1.9  High-sensitivity troponin was elevated but delta troponin was negative  INR 2.2    Lumbar spine CT:  New compression fracture L1 vertebral body  Moderate spinal stenosis  Increasing abdominal aortic aneurysm to 5.4 cm    Chest x-ray post left thoracotomy  Right suprahilar mass unchanged  Worsening appearance of metastatic lung disease  Vitals appear to be normal  Patient is on 2 L nasal cannula saturating at 99% although dyspneic on speech    12/31  Neurosurgical evaluation:  Pain control and brace  Patient stated his breathing is at baseline  Labs are stable  CKD appears a bit worse    1/1/2022  Patient is stable   his brace Has been placed he says it is extremely tight   He has not had a physical therapy evaluation following the brace  We are awaiting his evaluation in order to see his next venue of care  Labs have not been updated  Vitals are normal and he remains on 2 L nasal cannula saturating at 94%   he denies feeling any shortness of breath and feels that he is at baseline    01/02/2022  Patient states his back feels sore  He has difficulty ambulating  He also requires 2 L nasal cannula  He denies any chest pain or pressure sensation    1/3/2022  Patient feels comfortable  He is still on nasal cannula oxygen  I had requested evaluation for the next venue of care  Oxygen requirement has not been established  We will again request social service today along with PT and OT recommendations so that I may discharge this patient      Past Medical History:   Diagnosis Date    A-fib [facility])     AAA [facility]     measures 5.0 cm    Cancer ([facility] 2005    left lung    CHF (congestive heart failure) [facility]     COPD (chronic obstructive pulmonary disease) [facility]     Heart failure ([facility]     Hyperlipidemia     Hypertension        Past Surgical History:   Procedure Laterality Date    BRONCHOSCOPY N/A 9/1/2021    BRONCHOSCOPY W/EBUS FNA performed by Jignesh Celeste MD at [address] [place] [address] N/A 9/1/2021    BRONCHOSCOPY ADD ON COMPUTER ASSISTED performed by Jignesh Celeste MD at [address] [place] [address]  9/1/2021    BRONCHOSCOPY/TRANSBRONCHIAL NEEDLE BIOPSY performed by Jignesh Celeste MD at [address] [place] [address]  9/1/2021    BRONCHOSCOPY ALVEOLAR LAVAGE performed by Jignesh Celeste MD at [address] TEST N/A 06/16/2021    Lexiscan stress test    COLONOSCOPY      years ago    CT NEEDLE BIOPSY LUNG PERCUTANEOUS  7/1/2021    CT NEEDLE BIOPSY LUNG PERCUTANEOUS 7/1/2021 Rolando Ivan MD [facility] CT    LUNG CANCER SURGERY  01/01/2005       No Known Allergies    The patient's medical records have been reviewed contingent on availability    Review of Systems:   · General: Some malaise and weakness noted denies fever or chills.   · As per HPI      Physical Examination:      Wt Readings from Last 3 Encounters:   01/03/22 148 lb 3.2 oz (67.2 kg)   12/28/21 150 lb (68 kg)   12/15/21 151 lb (68.5 kg)     Temp Readings from Last 3 Encounters:   01/03/22 97.2 °F (36.2 °C) (Temporal)   12/28/21 97 °F (36.1 °C) (Infrared)   12/15/21 97.1 °F (36.2 °C) (Skin)     BP Readings from Last 3 Encounters:   01/03/22 137/89   12/28/21 119/71   12/15/21 (!) 148/68     Pulse Readings from Last 3 Encounters:   01/03/22 92   12/28/21 111   12/15/21 87       General appearance: Frail appearing awake, alert no distress. Less Dyspneic on speech  Skin: Color, texture, turgor normal. No rashes or lesions. Eyes: Conjunctivae/cornea clear. Ness Pal. Sclera non icteric. Neck:  Symmetric. No adenopathy. Lungs: Breath sounds are harsh but clear, no evidence of focal abnormality, reduced ventilation associated with positioning and some back pain  Heart: S1 > S2. Rhythm is regular and rate is normal. No gallop rub or murmur. Extremities: No deformities, edema, or skin discoloration. Musculoskeletal: As noted above low back pain reproducible by percussion  Neuro:   · Grossly normal  Mental status: Awake, alert, cognizant of person, place, time.     Patient appears capable of directing self care   Mood: Normal and appropriate affect  Gait & balance: not assessed: Assisted     Labs     CBC:   Lab Results   Component Value Date    WBC 9.8 12/30/2021    RBC 3.84 12/30/2021    HGB 12.1 12/30/2021    HCT 36.2 12/30/2021     12/30/2021    MCV 94.3 12/30/2021     BMP:    Lab Results   Component Value Date     01/01/2022    K 4.4 01/01/2022    CL 97 01/01/2022    CO2 28 01/01/2022    BUN 67 01/01/2022    CREATININE 2.1 01/01/2022    GLUCOSE 120 01/01/2022    CALCIUM 8.9 01/01/2022     Hepatic Function Panel:    Lab Results   Component Value Date    ALKPHOS 88 12/30/2021    AST 21 12/30/2021    ALT 21 12/30/2021    PROT 5.1 12/30/2021    LABALBU 2.9 12/30/2021    BILITOT 1.3 12/30/2021     Magnesium:    Lab Results   Component Value Date    MG 2.1 11/08/2021     Cardiac Enzymes:   Lab Results   Component Value Date    CKTOTAL 70 03/18/2016    CKMB 2.7 03/18/2016    TROPONINI <0.01 03/18/2016     LDH:  No results found for: LDH  PT/INR:    Lab Results   Component Value Date    PROTIME 24.2 12/30/2021 INR 2.2 12/30/2021     BNP: No results for input(s): BNP in the last 72 hours. TSH:   Lab Results   Component Value Date    TSH 3.130 06/16/2021      Cardiac Injury Profile: No results for input(s): CKTOTAL, CKMB, CKMBINDEX, TROPONINI in the last 72 hours.    Lipid Profile:   Lab Results   Component Value Date    TRIG 68 06/25/2021    HDL 38 06/25/2021    LDLCALC 43 06/25/2021    CHOL 95 06/25/2021      Hemoglobin A1C: No components found for: HGBA1C   U/A:   Lab Results   Component Value Date    LEUKOCYTESUR Negative 12/29/2021    PHUR 5.5 12/29/2021    WBCUA 0-1 06/15/2021    RBCUA 0-1 06/15/2021    BACTERIA RARE 06/15/2021    SPECGRAV 1.020 12/29/2021    BLOODU Negative 12/29/2021    GLUCOSEU Negative 12/29/2021         ADMISSION SCHEDULED MEDS:   Current Facility-Administered Medications   Medication Dose Route Frequency Provider Last Rate Last Admin    atorvastatin (LIPITOR) tablet 10 mg  10 mg Oral Every Other Day [facility], APRN - CNP   10 mg at 01/03/22 1025    bumetanide (BUMEX) tablet 1 mg  1 mg Oral Daily [facility], APRN - CNP   1 mg at 01/02/22 1179    dilTIAZem (CARDIZEM CD) extended release capsule 120 mg  120 mg Oral Daily [facility], APRN - CNP   120 mg at 01/03/22 1025    isosorbide mononitrate (IMDUR) extended release tablet 60 mg  60 mg Oral Daily [facility], APRN - CNP   60 mg at 01/03/22 1025    lactulose (CHRONULAC) 10 GM/15ML solution 10 g  10 g Oral BID [facility], APRN - CNP   10 g at 01/01/22 0916    metoprolol succinate (TOPROL XL) extended release tablet 100 mg  100 mg Oral BID [facility], APRN - CNP   100 mg at 01/03/22 1026    sodium chloride flush 0.9 % injection 5-40 mL  5-40 mL IntraVENous 2 times per day [facility] APRN - CNP   10 mL at 01/03/22 1027    sodium chloride flush 0.9 % injection 5-40 mL  5-40 mL IntraVENous PRN GRACIELA Benton - CNP        0.9 % sodium chloride infusion  25 mL IntraVENous PRN GRACIELA Benton - CNP        ondansetron (ZOFRAN-ODT) disintegrating tablet 4 mg  4 mg Oral Q8H PRN [place], APRN - CNP        Or    ondansetron (ZOFRAN) injection 4 mg  4 mg IntraVENous Q6H PRN [place], APRN - CNP        acetaminophen (TYLENOL) tablet 650 mg  650 mg Oral Q6H PRN [place], APRN - CNP   650 mg at 12/31/21 2047    Or    acetaminophen (TYLENOL) suppository 650 mg  650 mg Rectal Q6H PRN [place], APRN - CNP        potassium chloride (KLOR-CON M) extended release tablet 40 mEq  40 mEq Oral PRN [place], APRN - CNP        Or    potassium bicarb-citric acid (EFFER-K) effervescent tablet 40 mEq  40 mEq Oral PRN [place], APRN - CNP        Or    potassium chloride 10 mEq/100 mL IVPB (Peripheral Line)  10 mEq IntraVENous PRN [place], APRN - CNP        senna (SENOKOT) tablet 8.6 mg  1 tablet Oral Daily PRN Yen Iskrista, APRN - CNP   8.6 mg at 01/01/22 0916    oxyCODONE (ROXICODONE) immediate release tablet 5 mg  5 mg Oral Q6H PRN Yen Iskrista, APRN - CNP   5 mg at 01/03/22 1026    ipratropium-albuterol (DUONEB) nebulizer solution 1 ampule  1 ampule Inhalation Q4H WA Ankur Randle MD   1 ampule at 01/03/22 0908    apixaban (ELIQUIS) tablet 2.5 mg  2.5 mg Oral BID Ankur Randle MD   2.5 mg at 01/03/22 1025       Current  Infusions   sodium chloride         Prn Meds  sodium chloride flush, sodium chloride, ondansetron **OR** ondansetron, acetaminophen **OR** acetaminophen, potassium chloride **OR** potassium alternative oral replacement **OR** potassium chloride, senna, oxyCODONE    Radiology Review:  XR LUMBAR SPINE (2-3 VIEWS)   Final Result   Redemonstration of an acute or subacute compression fracture of L1 with   estimated 25% vertebral body height loss. No significant change in alignment. Multilevel spondylosis. Atherosclerosis of an abdominal aortic aneurysm.   See recent CT from   12/29/2021 for more accurate assessment of the abdominal aorta and   recommendations. CT CHEST WO CONTRAST   Final Result   1. New multifocal sub solid consolidations bilaterally. Findings favor   multifocal pneumonia, although recurrent metastatic disease is not excluded. Recommend attention on follow-up exam.   2. Background of moderate emphysema. 3. Osteoporotic compression fracture of L1 with estimated 25% vertebral body   height loss is new since June 2021 and otherwise age indeterminate. No   significant bony retropulsion. RECOMMENDATIONS:   Unavailable         CT LUMBAR SPINE WO CONTRAST   Final Result   1. New acute appearing compression fracture of the L1 vertebral body   2. Degenerative spondylolisthesis at L2-L3 causing moderate spinal stenosis   3. Degenerative disc disease at L4-L5, stable   4. Increased size of the abdominal aorta aneurysm, measuring      RECOMMENDATIONS:   Fusiform AAA Size:    Follow-up Recommendation1:      2.6-2.9 cm     Every 5 years 2      3.0-3.4 cm       Every 3 years      3.5-3.9 cm      Every 2 years      4.0-4.4 cm     Every 12 months,  recommend vascular consultation      4.5-5.4 cm     Every 6 months,  recommend vascular consultation      >5.5 cm        Referral to vascular specialist   ______________________________________________________________________________   _______ 1 Based on ACR White paper: IJ am Laura Radioli 2013;10:[id number].  2   For aortas of maximum diameter 2.6--2.9 cm meeting the criteria for AAA (>1.5   x proximal normal segment; no f/u if < 1.5 x proximal normal segment; no f/u   for aortas < 2.6 cm)      Note:  AAA enlargement of >0.5 cm in 6 months or >1 cm in 1 year, recommend   vascular consultation Note: Saccular AAA of any size, recommend vascular   consultation         XR CHEST (2 VW)   Final Result   Left post thoracotomy change with ipsilateral volume loss. Right suprahilar   known mass, similar to prior.   Separately, both lungs show worsening   opacities which may reflect worsening metastatic lung disease.                ASSESSMENT:  Active diagnoses treated at this admission:  · Acute L1 fracture  · Low back pain  · Metastatic lung cancer: Recent chemotherapy and radiation therapy completed  · History of COPD  · History of chronic congestive heart failure no evidence of current decompensation  · Atrial fibrillation currently rate controlled telemetry and ECG reviewed  · CKD    Problem list:  Patient Active Problem List   Diagnosis    COPD (chronic obstructive pulmonary disease) (HCC)    Hyperlipidemia LDL goal <100    Essential hypertension    Congestive heart failure (CHF) (HCC)    Lung mass    Atrial fibrillation (HCC)    Moderate protein-calorie malnutrition (HCC)    Abdominal aortic aneurysm (AAA) without rupture (HCC)    Chronic systolic congestive heart failure (HCC)    Non-small cell cancer of right lung (HCC)    Malignant neoplasm of lung (HCC)    Compression fracture of first lumbar vertebra with routine healing    Abnormal WBC count - Low blast count noted       PLAN:  Anticoagulation he restarted  Continue diuretic therapy reassess metabolic panel-and renal function  Pain control is adequate  Consultation to neurosurgery reviewed awair brace then eval for mobility  Will require DVT prophylaxis  We will order straight aerosol treatments  Administer single dose of steroid in light of his history  Patient has a history of lung mass and malignant neoplasm of the lung  CT scan reviewed-he completed chemotherapy on 12/14/2021 and radiation therapy on 12/20/2021  Monitor electrolytes closely monitor hydration closely  Reviewed PT and OT evaluations    Will attempt discharge today patient may require oxygen at home-I did not receive a response from staff yesterday will inquire again today about discharge    He will require follow-up with oncology as well    See  Shanta Brown MD, FACP  11:12 AM  1/3/2022

## 2022-01-03 NOTE — CARE COORDINATION
Wife states she spoke with someone in family and they now want [facility]. Referral to [facility], await acceptance.

## 2022-01-03 NOTE — CONSULTS
Noted consult for poor PO intake. Nutrition assessment completed w/ Ensure ordered BID. See RD progress note 12/31 for full detail. Will increase ONS and continue to follow as scheduled.  Thank you  Electronically signed by Deanna Zayas MS, RD, LD on 1/3/2022 at 2:39 PM

## 2022-01-03 NOTE — PLAN OF CARE
Problem: Pain:  Goal: Pain level will decrease  Description: Pain level will decrease  Outcome: Met This Shift  Goal: Control of acute pain  Description: Control of acute pain  Outcome: Met This Shift  Goal: Control of chronic pain  Description: Control of chronic pain  Outcome: Met This Shift     Problem: OXYGENATION/RESPIRATORY FUNCTION  Goal: Patient will maintain patent airway  Outcome: Met This Shift     Problem: HEMODYNAMIC STATUS  Goal: Patient has stable vital signs and fluid balance  Outcome: Met This Shift

## 2022-01-03 NOTE — CARE COORDINATION
SW met with patient and his wife who is at bedside. We discussed therapy evals and need for oxygen now. Offered [facility] at discharge and both in agreement. They think they want [facility], but have to call to speak with a niece first for sure. Wife called while SW in room but had to leave message. They will let SW know for sure, tentative referral to [facility] at [address] [place] [address].

## 2022-01-03 NOTE — PROGRESS NOTES
Physical Therapy  Physical Therapy      Name: Mohsen Trujillo  : 1935  MRN: [id number]      Date of Service: 1/3/2022    Evaluating PT:  Rose Wagner, PT, DPT [id number]     Room #:  0041/8323-X  Diagnosis:  Compression fracture of first lumbar vertebra with routine healing [S32.010D]  Nontraumatic compression fracture of L1 vertebra, initial encounter [facility]) [X22.43PU]  Reason for admission: back pain  Precautions:  Falls, TLSO, spinal neutral, L1 compression fx  Procedure/Surgery:  None   Equipment Recommendations:  FWW    SUBJECTIVE:  Pt lives with wife in a 1 story home with 5 DENNIS 2 rails. Pt ambulated with no AD but last week or two uses [employer] PRN PTA. OBJECTIVE:   Initial Evaluation  Date:  Treatment 1/3/22   Short Term/ Long Term   Goals   AM-PAC 6 Clicks 47/51 91/03    Was pt agreeable to Eval/treatment? Yes  yes    Does pt have pain?  Back pain with movement Back pain    Bed Mobility  Rolling: Tye  Supine to sit: ModA  Sit to supine: ModA  Scooting: Tye Rolling Tye  Supine to sit MaxA  Sit to supine ModA  Scooting ModA Independent    Transfers Sit to stand: Min<>ModA  Stand to sit: Tye  Stand pivot: NT Sit to stand Tye  Stand to sit Tye  Stand pivot ModA with ww Independent    Ambulation    10 feet x2 with [employer] Tye   8 feet x2 reps with ww Tye >100 feet with [employer] Mod I    Stair negotiation: ascended and descended  NT N/t TBD     LE ROM: WFL    LE Strength:   knee ext: 5/5  ankle DF: 5/5    Balance:   Sitting static: Independent  Sitting dynamic: Supervision  Standing static: Tye  Standing dynamic: Tye      -Pt is A & O x 3  -Sensation:  Pt denies numbness and tingling to extremities  -Edema:  unremarkable     Therapeutic Exercises:  Functional activity     Patient education  Pt educated on safety, sequencing of transfers, and role of PT    Patient response to education:   Pt verbalized understanding Pt demonstrated skill Pt requires further education in this area   Yes  Yes  Yes ASSESSMENT:  Conditions Requiring Skilled Therapeutic Intervention:  [x]Decreased strength     [x]Decreased ROM  [x]Decreased functional mobility  [x]Decreased balance   [x]Decreased endurance   [x]Decreased posture  []Decreased sensation  []Decreased coordination   []Decreased vision  [x]Decreased safety awareness   [x]Increased pain       Comments:  Pt received supine in bed and agreeable to PT session   Reviewed precautions with pt. Pt rolled L and R with assistance to michele TLSO. Pt on 3 liters and 97% at rest. Pt sat EOB. Pt amb into bathroom and assisted onto commode. Pt monitored on commode. Pt amb with ww back to bed due to pt fatigues quickly. Pt's O2 decreased to 83% on 3 liters. Pt monitored and cued for proper breathing technique. Several minutes required for pt's O2 to improve to 90%. Nurse notified. Pt with HOB elevated and pt remained in TLSO. Pt given call light. Pt with all needs met and call light in reach. Pt would benefit from continued PT POC to address functional deficits described above. Treatment:  Patient practiced and was instructed in the following treatment:     Therapeutic activity  o Patient education provided continuously throughout session for sequencing, safety maintenance, and improving any deficits found during the evaluation.   o Bed mobility training - pt given verbal and tactile cues to facilitate proper sequencing and safety during rolling and supine>sit as well as provided with physical assistance to complete task    o Sitting EOB for >5 minutes for upright tolerance, postural awareness and BLE ROM   o STS and pivot transfer training - pt educated on proper hand and foot placement, safety and sequencing, and use of proper technique to safely complete sit<>stand and pivot transfers with hands on assistance to complete task safely   o Gait training- pt was given verbal and tactile cues to facilitate improved speed and posture during ambulation as well as provided with physical assistance. Pt's/ family goals   1. Rehab    Patient and or family understand(s) diagnosis, prognosis, and plan of care. yes    Prognosis is good for reaching above PT goals. PHYSICAL THERAPY PLAN OF CARE:    PT POC is established based on physician order and patient diagnosis     Referring provider/PT Order:  *12/30/21 0045   PT evaluation and treat Start: 12/30/21 0045, End: 12/30/21 0045, ONE TIME, Standing Count: 1 Occurrences, R    Skyla Scott, APRN - CNP    Diagnosis:  Compression fracture of first lumbar vertebra with routine healing [S32.010D]  [facility]ontraumatic [facility] of L1 vertebra[facility] [P12.92VY]  Specific instructions for next treatment:  Progress ambulation. Sitting oob in chair. Current Treatment Recommendations:   [x] Strengthening to improve independence with functional mobility   [x] ROM to improve independence with functional mobility   [x] Balance Training to improve static/dynamic balance and to reduce fall risk  [x] Endurance Training to improve activity tolerance during functional mobility   [x] Transfer Training to improve safety and independence with all functional transfers   [x] Gait Training to improve gait mechanics, endurance and asses need for appropriate assistive device  [] Stair Training in preparation for safe discharge home and/or into the community   [x] Positioning to prevent skin breakdown and contractures  [x] Safety and Education Training   [] Patient/Caregiver Education   [] HEP  [] Other     PT long term treatment goals are located in above grid    Frequency of treatments: 2-5x/week x 1-2 weeks.     Time in  1500  Time out  1530    Total Treatment Time  30 minutes     Evaluation Time includes thorough review of current medical information, gathering information on past medical history/social history and prior level of function, completion of standardized testing/informal observation of tasks, assessment of data and education on plan of care and goals.     CPT codes:  [] Low Complexity PT evaluation 67970  [] Moderate Complexity PT evaluation 12896  [] High Complexity PT evaluation 78556  [] PT Re-evaluation 76430  [] Gait training 96858 - minutes  [] Manual therapy 37249 - minutes  [x] Therapeutic activities 39369 30 minutes  [] Therapeutic exercises 89851 - minutes  [] Neuromuscular reeducation 06822 - minutes     Mendel Ursula [id number]

## 2022-01-03 NOTE — PROGRESS NOTES
Occupational Therapy  OT BEDSIDE TREATMENT NOTE   [facility] [place], [facility] [address]  [address] [place] [address]       Date:1/3/2022  Patient Name: Flor Beatty  MRN: [id number]  : 1935  Room: [address] [place] [address]     Per OT Eval:    Evaluating OT: Natasha Healy OTR/L #[id number]      Referring Provider: GRACIELA Townsend CNP  Specific Provider Orders/Date: OT eval and treat 21     Diagnosis: Compression fracture of first lumbar vertebra with routine healing [S32.010D]  Nontraumatic compression fracture of L1 vertebra, [facility] [M48.56XA]   Pt admitted to hospital for fatigue, weakness, SOB and back pain - finished chemo x1 week ago. L1 compression fracture      Pertinent Medical History:  has a past medical history of A-fib ([facility] AAA (abdominal aortic aneurysm) ([facility] Cancer ([facility] CHF (congestive heart failure) ([facility] COPD (chronic obstructive pulmonary disease) ([facility] Heart failure ([facility] Hyperlipidemia, and Hypertension.         Precautions:  Fall Risk, spinal precautions, TLSO, O2     Assessment of current deficits    [x]? Functional mobility          [x]? ADLs           [x]? Strength                  []?Cognition    [x]? Functional transfers        [x]? IADLs         [x]? Safety Awareness   [x]? Endurance    []? Fine Coordination                        [x]? Balance      []? Vision/perception   []? Sensation      []? Gross Motor Coordination            []? ROM           []?  Delirium                   []? Motor Control      OT PLAN OF CARE   OT POC based on physician orders, patient diagnosis and results of clinical assessment     Frequency/Duration 1-3 days/wk for 2 weeks PRN   Specific OT Treatment Interventions to include:   * Instruction/training on adapted ADL techniques and AE recommendations to increase functional independence within precautions       * Training on energy conservation strategies, correct breathing pattern and techniques to improve independence/tolerance for self-care routine  * Functional transfer/mobility training/DME recommendations for increased independence, safety, and fall prevention  * Patient/Family education to increase follow through with safety techniques and functional independence  * Recommendation of environmental modifications for increased safety with functional transfers/mobility and ADLs  * Therapeutic exercise to improve motor endurance, ROM, and functional strength for ADLs/functional transfers  * Therapeutic activities to facilitate/challenge dynamic balance, stand tolerance for increased safety and independence with ADLs  * Therapeutic activities to facilitate gross/fine motor skills for increased independence with ADLs  * Neuro-muscular re-education: facilitation of righting/equilibrium reactions, midline orientation, scapular stability/mobility, normalization of muscle tone, and facilitation of volitional active controled movement     Recommended Adaptive Equipment:  TBD      Home Living: Pt lives with wife in a 1 story home with 5 DENNIS and B hand rails.     Bathroom setup: tub/shower combo    Equipment owned: shower chair, w/w     Prior Level of Function: Independent with ADLs , Independent with IADLs; ambulated without AD (utilizing w/w for past week prn)   Driving: yes   Occupation: retired     Pain Level: back pain noted with movement, nsg informed & re-enforced pain management strategies      Cognition: A&O: 4/4; Follows 2 step directions, pleasant & cooperative             Memory: fair/fair- decreased recall of spinal precautions              Sequencing: good             Problem solving:  fair             Judgement/safety:  fair-                Functional Assessment:  AM-PAC Daily Activity Raw Score: 13/24    Initial Eval Status  Date: 1/1/22 Treatment Status  Date:  1/3/21 STGs = LTGs  Time frame: 10-14 days   Feeding Independent   SBA  Poor intake noted     Grooming Minimal Assist      Standing grooming tasks  Min A  Seated, unable to tolerate standing at sink due SOB & fatigue  Modified [place]    UB Dressing Minimal Assist   Gown      Max A  TLSO  Mod A  Gown, bed level    Max A  TLSO brace bed level Supervision            Min A  TLSO   LB Dressing Dependent  Dep  Doff/michele brief bed level Minimal Assist    Bathing Maximal Assist  Max A Minimal Assist    Toileting Moderate Assist   Dep  Pt attempting to use urinal bed level, but having difficulty managing urinal, brief wet with urine, assist with hygiene, also used commode for BM but not results Minimal Assist    Bed Mobility  Supine to sit: Moderate Assist   Sit to supine: Moderate Assist   Max A  Supine < > sit  Supine to sit: Stand by Assist   Sit to supine: Stand by Assist    Functional Transfers Moderate Assist   Mod A  Cues for hand placement & technique    Stand by Assist    Functional Mobility Minimal Assist      Ambulated to/from bathroom with w/w  Min-Mod A  With walker, increased assist as pt was fatigued after using bathroom  Stand by Assist    Balance Sitting:     Static:  SBA    Dynamic:SBA  Standing: min A Sitting: CGA/Min A  Standing: Min-Mod A  With walker       Activity Tolerance F Fair-  Increased fatigue today & SOB  97% on 3L @ rest  After mobility dropped 83% unable to recover on 3L & completing PLB techniques, increased slowly to 7L before pt able to recover, 5 minutes, nsg informed & requested to leave pt on 7L, until she assesses  G   Visual/  Perceptual Glasses: yes  wfl                       Education:  Pt was educated through out treatment regarding proper technique & safety with bed mobility, functional transfers & mobility, maintaining spinal precautions, placement of TLSO brace & ADL compensatory strategies to ease tasks, improve safety & prevent falls to return home safely. Comments: Upon arrival pt was in bed & agreeable for therapy.  At end of session pt was returned to bed due to SOB, unable to tolerate sitting up in chair this date, nephew arrived to visit, all lines and tubes intact, call light within reach. Bed alarm set, nsg informed regarding O2 stats & SOB. Pt reports much improved at end of session but pt was fatigued. · Pt has made Poor progress towards set goals. · Continue with current plan of care      Treatment Time In: 3:00            Treatment Time Out: 3:30          Treatment Charges: Mins Units   Ther Ex  91009     Manual Therapy 57881     Thera Activities 21173 15 1   ADL/Home Mgt 88225 15 1   Neuro Re-ed 71766     Group Therapy      Orthotic manage/training  81558     Non-Billable Time     Total Timed Treatment 30 2       [address], [address] [place] [address]

## 2022-01-04 PROBLEM — M54.9 INTRACTABLE BACK PAIN: Status: ACTIVE | Noted: 2022-01-01

## 2022-01-04 NOTE — CARE COORDINATION
Patient to discharge to [facility]. Have called and arranged transport via [facility] for 7p. Have notified patient and wife, Ana Rosa Matute. HENS done, Envelope and ambulance form on soft chart.

## 2022-01-04 NOTE — DISCHARGE INSTR - COC
Continuity of Care Form    Patient Name: Jenise Saavedra   :  1935  MRN:  [id number]    Admit date:  2021  Discharge date:  22    Code Status Order: Full Code   Advance Directives:      Admitting Physician:  No admitting provider for patient encounter.   PCP: Ethan Bueno MD    Discharging Nurse: [facility] Unit/Room#: 3153/1968-Z  Discharging Unit Phone Number: [phone]    Emergency Contact:   Extended Emergency Contact Information  Primary Emergency Contact: Ashlyn Barlow  Address: [address] [place] [address]           Víctor Ortiz [address] [place] [address] Phone: [phone]  Relation: Spouse  Preferred language: English    Past Surgical History:  Past Surgical History:   Procedure Laterality Date    BRONCHOSCOPY N/A 2021    BRONCHOSCOPY W/EBUS FNA performed by David Cuello MD at [facility] N/A 2021    BRONCHOSCOPY ADD ON COMPUTER ASSISTED performed by David Cuello MD at [address] [place] [address]  2021    BRONCHOSCOPY/TRANSBRONCHIAL NEEDLE BIOPSY performed by David Cuello MD at [address] [place] [address]  2021    BRONCHOSCOPY ALVEOLAR LAVAGE performed by David Cuello MD at [address] [place] [address] [address] TEST N/A 2021    [facility] stress test    COLONOSCOPY      years ago    CT NEEDLE BIOPSY LUNG PERCUTANEOUS  2021    CT NEEDLE BIOPSY LUNG PERCUTANEOUS 2021 Madelyn Aguilar MD [facility] CT    LUNG CANCER SURGERY  2005       Immunization History:   Immunization History   Administered Date(s) Administered    COVID-19, Pfizer, PF, 30mcg/0.3mL 2021, 2021       Active Problems:  Patient Active Problem List   Diagnosis Code    COPD (chronic obstructive pulmonary disease) ([facility] J44.9    Hyperlipidemia LDL goal <100 E78.5    Essential hypertension I10    Congestive heart failure (CHF) (HCC) I50.9    Lung mass R91.8    Atrial fibrillation (HCC) I48.91    Moderate protein-calorie malnutrition ([facility] E44.0    Abdominal aortic aneurysm (AAA) without rupture (HCC) I71.4    Chronic systolic congestive heart failure (HCC) I50.22    Non-small cell cancer of right lung (HCC) C34.91    Malignant neoplasm of lung (HCC) C34.90    Compression fracture of first lumbar vertebra with routine healing S32.010D    Abnormal WBC count - Low blast count noted D72.9    Intractable back pain M54.9       Isolation/Infection:   Isolation            No Isolation          Patient Infection Status       Infection Onset Added Last Indicated Last Indicated By Review Planned Expiration Resolved Resolved By    None active    Resolved    COVID-19 (Rule Out) 12/29/21 12/29/21 12/29/21 COVID-19, Rapid (Ordered)   12/29/21 Rule-Out Test Resulted            Nurse Assessment:  Last Vital Signs: BP (!) 145/88   Pulse 85   Temp 98 °F (36.7 °C)   Resp 20   Ht 5' 9\" (1.753 m)   Wt 144 lb 3.2 oz (65.4 kg)   SpO2 96%   BMI 21.29 kg/m²     Last documented pain score (0-10 scale): Pain Level: 0  Last Weight:   Wt Readings from Last 1 Encounters:   01/04/22 144 lb 3.2 oz (65.4 kg)     Mental Status:  oriented and alert    IV Access:  - None    Nursing Mobility/ADLs:  Walking   Assisted  Transfer  Assisted  Bathing  Assisted  Dressing  Assisted  Toileting  Assisted  Feeding  Independent  Med Admin  Independent  Med Delivery   whole    Wound Care Documentation and Therapy:        Elimination:  Continence: Bowel: Yes  Bladder: Yes  Urinary Catheter: None   Colostomy/Ileostomy/Ileal Conduit: No       Date of Last BM: 1/5/22    Intake/Output Summary (Last 24 hours) at 1/4/2022 1335  Last data filed at 1/4/2022 1015  Gross per 24 hour   Intake 300 ml   Output --   Net 300 ml     I/O last 3 completed shifts: In: 120 [P.O.:120]  Out: -     Safety Concerns:      At Risk for Falls    Impairments/Disabilities:      None    Nutrition Therapy:  Current Nutrition Therapy:   - Oral Diet:  General and Low Sodium (2gm)    Routes of Feeding: Oral  Liquids: No Restrictions  Daily Fluid Restriction: no  Last Modified Barium Swallow with Video (Video Swallowing Test): not done    Treatments at the Time of Hospital Discharge:   Respiratory Treatments: ***  Oxygen Therapy:  is on oxygen at 5 L/min per nasal cannula. Ventilator:    - No ventilator support    Rehab Therapies: Physical Therapy and Occupational Therapy  Weight Bearing Status/Restrictions: No weight bearing restirctions  Other Medical Equipment (for information only, NOT a DME order):  braces TLSO  Other Treatments: ***    Patient's personal belongings (please select all that are sent with patient):  Glasses    RN SIGNATURE:  Electronically signed by Roshni Madison RN on 1/5/22 at 8:57 AM EST    CASE MANAGEMENT/SOCIAL WORK SECTION    Inpatient Status Date: ***    Readmission Risk Assessment Score:  Readmission Risk              Risk of Unplanned Readmission:  19           Discharging to Facility/ Agency   Name:   Address:  Phone:  Fax:    Dialysis Facility (if applicable)   Name:  Address:  Dialysis Schedule:  Phone:  Fax:    / signature: {Esignature:[id number]}    PHYSICIAN SECTION    Prognosis: {Prognosis:3188307463}    Condition at Discharge: [address] [place] [address] Patient Condition:[id number]}    Rehab Potential (if transferring to Rehab): {Prognosis:8117221313}    Recommended Labs or Other Treatments After Discharge: ***    Physician Certification: I certify the above information and transfer of Pati Bhagat  is necessary for the continuing treatment of the diagnosis listed and that he requires {Admit to Appropriate Level of Care:21011} for {GREATER/LESS:240705431} 30 days.      Update Admission H&P: {P DME Changes in YKNY:048441278}    PHYSICIAN SIGNATURE:  Electronically signed by Hipolito Billy MD on 1/4/22 at 1:35 PM EST

## 2022-01-04 NOTE — PROGRESS NOTES
Occupational Therapy  OT BEDSIDE TREATMENT NOTE   [facility] [address] [address]       Date:2022  Patient Name: India Plunkett  MRN: [id number]  : 1935  Room: [facility]     Per OT Eval:    Evaluating OT: Sharon Cee OTR/L #[id number]      Referring Provider: GRACIELA Benton CNP  Specific Provider Orders/Date: OT eval and treat 21     Diagnosis: Compression fracture of first lumbar vertebra with routine healing [S32.010D]  Nontraumatic compression fracture of L1 vertebra, [facility] [M48.56XA]   Pt admitted to hospital for fatigue, weakness, SOB and back pain - finished chemo x1 week ago. L1 compression fracture      Pertinent Medical History:  has a past medical history of A-fib ([facility] AAA (abdominal aortic aneurysm) ([facility] Cancer ([facility] CHF (congestive heart failure) ([facility] COPD (chronic obstructive pulmonary disease) ([facility] Heart failure ([facility] Hyperlipidemia, and Hypertension.         Precautions:  Fall Risk, spinal precautions, TLSO, O2     Assessment of current deficits    [x]? Functional mobility          [x]? ADLs           [x]? Strength                  []?Cognition    [x]? Functional transfers        [x]? IADLs         [x]? Safety Awareness   [x]? Endurance    []? Fine Coordination                        [x]? Balance      []? Vision/perception   []? Sensation      []? Gross Motor Coordination            []? ROM           []?  Delirium                   []? Motor Control      OT PLAN OF CARE   OT POC based on physician orders, patient diagnosis and results of clinical assessment     Frequency/Duration 1-3 days/wk for 2 weeks PRN   Specific OT Treatment Interventions to include:   * Instruction/training on adapted ADL techniques and AE recommendations to increase functional independence within precautions       * Training on energy conservation strategies, correct breathing pattern and techniques to improve independence/tolerance for self-care routine  * Functional transfer/mobility training/DME recommendations for increased independence, safety, and fall prevention  * Patient/Family education to increase follow through with safety techniques and functional independence  * Recommendation of environmental modifications for increased safety with functional transfers/mobility and ADLs  * Therapeutic exercise to improve motor endurance, ROM, and functional strength for ADLs/functional transfers  * Therapeutic activities to facilitate/challenge dynamic balance, stand tolerance for increased safety and independence with ADLs  * Therapeutic activities to facilitate gross/fine motor skills for increased independence with ADLs  * Neuro-muscular re-education: facilitation of righting/equilibrium reactions, midline orientation, scapular stability/mobility, normalization of muscle tone, and facilitation of volitional active controled movement     Recommended Adaptive Equipment:  TBD      Home Living: Pt lives with wife in a 1 story home with 5 DENNIS and B hand rails.     Bathroom setup: tub/shower combo    Equipment owned: shower chair, w/w     Prior Level of Function: Independent with ADLs , Independent with IADLs; ambulated without AD (utilizing w/w for past week prn)   Driving: yes   Occupation: retired     Pain Level: back pain noted with movement, nsg informed & re-enforced pain management strategies      Cognition: A&O: 4/4; Follows 2 step directions, pleasant & cooperative             Memory: fair/fair- decreased recall of spinal precautions              Sequencing: good             Problem solving:  fair             Judgement/safety:  fair-                Functional Assessment:  AM-PAC Daily Activity Raw Score: 12/24    Initial Eval Status  Date: 1/1/22 Treatment Status  Date:  1/4/22 STGs = LTGs  Time frame: 10-14 days   Feeding Independent   SBA  Poor intake noted     Grooming Minimal Assist      Standing grooming tasks  Min A  Seated, unable to tolerate standing or complete mobility into bathroom due to c/o pain & SOB with TLSO brace in place Modified [place]    UB Dressing Minimal Assist   Gown      Max A  TLSO  Mod A  Gown, bed level    Max A  TLSO brace bed level Supervision            Min A  TLSO   LB Dressing Dependent  Dep  Doff/michele brief bed level Minimal Assist    Bathing Maximal Assist  Max A  simulated Minimal Assist    Toileting Moderate Assist   Dep  Incontinent of urine, poor control of bladder, wears brief   Changed soiled brief  Minimal Assist    Bed Mobility  Supine to sit: Moderate Assist   Sit to supine: Moderate Assist  Rolling: Mod A   Max A  Supine < > sit    Supine to sit: Stand by Assist   Sit to supine: Stand by Assist    Functional Transfers Moderate Assist   Mod A  Cues for hand placement & technique    Stand by Assist    Functional Mobility Minimal Assist      Ambulated to/from bathroom with w/w  Mod A  Hand held assist only taking a few steps to chair & then back to bed, pt declined to walk further due to pain with wearing brace & SOB  Stand by Assist    Balance Sitting:     Static:  SBA    Dynamic:SBA  Standing: min A Sitting: CGA/Min A  Standing: Mod A  With walker       Activity Tolerance F Fair-  5L O2 @ rest 96%  Dropped 93% after exertion, recovered 96%  SOB with exertion  G   Visual/  Perceptual Glasses: yes  wfl                       Education:  Pt was educated through out treatment regarding proper technique & safety with bed mobility, functional transfers & mobility, maintaining spinal precautions, placement of TLSO brace & ADL compensatory strategies to ease tasks, improve safety & prevent falls to return home safely. Educated pt neuro  recommendation of wearing brace when OOB with pt reporting it causes him \"more pain than good\" .  Brace demonstrates good positioning but pt unable to tolerate \"I can't breath with that on\", no matter what adjustment was made from this therapist, therefore brace was removed once returned to bed. Comments: Upon arrival pt was in bed & agreeable for therapy. At end of session pt was returned to bed due to SOB, unable to tolerate sitting up in chair this date, wife arrived to visit, all lines and tubes intact, call light within reach. Bed alarm set. · Pt has made Slow progress towards set goals. · Continue with current plan of care      Treatment Time In: 10:45           Treatment Time Out: 11:10          Treatment Charges: Mins Units   Ther Ex  71344     Manual Therapy [phone]     Thera Activities 15051 10 1   ADL/Home Mgt 04977 15 1   Neuro Re-ed 30830     Group Therapy      Orthotic manage/training  34042     Non-Billable Time     Total Timed Treatment 25 2       Cari GOMEZ  [address] [place] [address], [address] [place] [address]

## 2022-01-04 NOTE — CARE COORDINATION
Note from Dr Chris Phillips reviewed that patient is medically stable for discharge and awaiting placement. AS STATED in previous SW note, patient is accepted to [facility] and no precert is needed, need a covid test prior to discharge. SW left VM message for Dr Chris Phillips per Perfect [facility] regarding possible discharge, awaiting response.

## 2022-01-04 NOTE — PROGRESS NOTES
Sindhu Matias MD, FACP                   Patient Name: Damian Victor                   Age:  81 y.o. Gender:   male    CC: Back pain    HPI: History obtained from multiple sources, chart review patient interview. This 52-year-old male with a history of lung cancer and chemotherapy coupled with radiation therapy presents with complaint of low back pain. There is no specific trauma associated with this however he stated he was bending down when he had the onset of pain about a week ago. He actually denies shortness of breath but is significantly dyspneic on speech.     Initial emergency room evaluation:  BUN 35 creatinine 1.9  High-sensitivity troponin was elevated but delta troponin was negative  INR 2.2    Lumbar spine CT:  New compression fracture L1 vertebral body  Moderate spinal stenosis  Increasing abdominal aortic aneurysm to 5.4 cm    Chest x-ray post left thoracotomy  Right suprahilar mass unchanged  Worsening appearance of metastatic lung disease  Vitals appear to be normal  Patient is on 2 L nasal cannula saturating at 99% although dyspneic on speech    12/31  Neurosurgical evaluation:  Pain control and brace  Patient stated his breathing is at baseline  Labs are stable  CKD appears a bit worse    1/1/2022  Patient is stable   his brace Has been placed he says it is extremely tight   He has not had a physical therapy evaluation following the brace  We are awaiting his evaluation in order to see his next venue of care  Labs have not been updated  Vitals are normal and he remains on 2 L nasal cannula saturating at 94%   he denies feeling any shortness of breath and feels that he is at baseline    01/02/2022  Patient states his back feels sore  He has difficulty ambulating  He also requires 2 L nasal cannula  He denies any chest pain or pressure sensation    1/3/2022  Patient feels comfortable  He is still on nasal cannula oxygen  I had requested evaluation for the next venue of care  Oxygen requirement has not been established  We will again request social service today along with PT and OT recommendations so that I may discharge this patient    1/4/2022  Patient is awake alert cognizant  He feels well  He is still on nasal cannula oxygen  Social service is planning location for discharge  He is medically stable      Past Medical History:   Diagnosis Date    A-fib [facility])     AAA [facility]abdominal [facility]) [facility]     measures 5.0 cm    Cancer ([facility] 2005    left lung    CHF (congestive heart failure) ([facility]     COPD (chronic obstructive pulmonary disease) ([facility]     Heart failure ([facility]     Hyperlipidemia     Hypertension        Past Surgical History:   Procedure Laterality Date    BRONCHOSCOPY N/A 9/1/2021    BRONCHOSCOPY W/EBUS FNA performed by David Cuello MD at [address] N/A 9/1/2021    BRONCHOSCOPY ADD ON COMPUTER ASSISTED performed by David Cuello MD at [address]  9/1/2021    BRONCHOSCOPY/TRANSBRONCHIAL NEEDLE BIOPSY performed by David Cuello MD at [address]  9/1/2021    [address] performed by David Cuello MD at [address] TEST N/A 06/16/2021    Lexiscan stress test    COLONOSCOPY      years ago    CT NEEDLE BIOPSY LUNG PERCUTANEOUS  7/1/2021    CT NEEDLE BIOPSY LUNG PERCUTANEOUS 7/1/2021 Madelyn Aguilar MD [facility] CT    LUNG CANCER SURGERY  01/01/2005       No Known Allergies    The patient's medical records have been reviewed contingent on availability    Review of Systems:   · General: Some malaise and weakness noted denies fever or chills.   · As per HPI      Physical Examination:      Wt Readings from Last 3 Encounters:   01/04/22 144 lb 3.2 oz (65.4 kg)   12/28/21 150 lb (68 kg)   12/15/21 151 lb (68.5 kg)     Temp Readings from Last 3 Encounters:   01/03/22 97.3 °F (36.3 °C) (Temporal)   12/28/21 97 °F (36.1 °C) (Infrared)   12/15/21 97.1 °F (36.2 °C) (Skin)     BP Readings from Last 3 Encounters:   01/03/22 (!) 142/86   12/28/21 119/71   12/15/21 (!) 148/68     Pulse Readings from Last 3 Encounters:   01/04/22 74   12/28/21 111   12/15/21 87       General appearance: Frail appearing awake, alert no distress. Less Dyspneic on speech  Skin: Color, texture, turgor normal. No rashes or lesions. Eyes: Conjunctivae/cornea clear. Arelis Risen. Sclera non icteric. Neck:  Symmetric. No adenopathy. Lungs: Breath sounds are harsh but clear, no evidence of focal abnormality, reduced ventilation associated with positioning and some back pain  Heart: S1 > S2. Rhythm is regular and rate is normal. No gallop rub or murmur. Extremities: No deformities, edema, or skin discoloration. Musculoskeletal: As noted above low back pain reproducible by percussion  Neuro:   · Grossly normal  Mental status: Awake, alert, cognizant of person, place, time.     Patient appears capable of directing self care   Mood: Normal and appropriate affect  Gait & balance: not assessed: Assisted     Labs     CBC:   Lab Results   Component Value Date    WBC 9.8 12/30/2021    RBC 3.84 12/30/2021    HGB 12.1 12/30/2021    HCT 36.2 12/30/2021     12/30/2021    MCV 94.3 12/30/2021     BMP:    Lab Results   Component Value Date     01/01/2022    K 4.4 01/01/2022    CL 97 01/01/2022    CO2 28 01/01/2022    BUN 67 01/01/2022    CREATININE 2.1 01/01/2022    GLUCOSE 120 01/01/2022    CALCIUM 8.9 01/01/2022     Hepatic Function Panel:    Lab Results   Component Value Date    ALKPHOS 88 12/30/2021    AST 21 12/30/2021    ALT 21 12/30/2021    PROT 5.1 12/30/2021    LABALBU 2.9 12/30/2021    BILITOT 1.3 12/30/2021     Magnesium:    Lab Results   Component Value Date    MG 2.1 11/08/2021     Cardiac Enzymes:   Lab Results   Component Value Date    CKTOTAL 79 03/18/2016    CKMB 2.7 03/18/2016    TROPONINI <0.01 03/18/2016     LDH:  No results found for: LDH  PT/INR:    Lab Results   Component Value Date    PROTIME 24.2 12/30/2021    INR 2.2 12/30/2021     BNP: No results for input(s): BNP in the last 72 hours. TSH:   Lab Results   Component Value Date    TSH 3.130 06/16/2021      Cardiac Injury Profile: No results for input(s): CKTOTAL, CKMB, CKMBINDEX, TROPONINI in the last 72 hours.    Lipid Profile:   Lab Results   Component Value Date    TRIG 68 06/25/2021    HDL 38 06/25/2021    LDLCALC 43 06/25/2021    CHOL 95 06/25/2021      Hemoglobin A1C: No components found for: HGBA1C   U/A:   Lab Results   Component Value Date    LEUKOCYTESUR Negative 12/29/2021    PHUR 5.5 12/29/2021    WBCUA 0-1 06/15/2021    RBCUA 0-1 06/15/2021    BACTERIA RARE 06/15/2021    SPECGRAV 1.020 12/29/2021    BLOODU Negative 12/29/2021    GLUCOSEU Negative 12/29/2021         ADMISSION SCHEDULED MEDS:   Current Facility-Administered Medications   Medication Dose Route Frequency Provider Last Rate Last Admin    atorvastatin (LIPITOR) tablet 10 mg  10 mg Oral Every Other Day Gloria Forget, APRN - CNP   10 mg at 01/03/22 1025    bumetanide (BUMEX) tablet 1 mg  1 mg Oral Daily Gloria Forget, APRN - CNP   1 mg at 01/04/22 0949    dilTIAZem (CARDIZEM CD) extended release capsule 120 mg  120 mg Oral Daily Gloria Forget, APRN - CNP   120 mg at 01/04/22 0949    isosorbide mononitrate (IMDUR) extended release tablet 60 mg  60 mg Oral Daily Gloria Forget, APRN - CNP   60 mg at 01/04/22 0949    lactulose (CHRONULAC) 10 GM/15ML solution 10 g  10 g Oral BID Gloria Forget, APRN - CNP   10 g at 01/04/22 0945    metoprolol succinate (TOPROL XL) extended release tablet 100 mg  100 mg Oral BID Gloria Forget, APRN - CNP   100 mg at 01/04/22 0946    sodium chloride flush 0.9 % injection 5-40 mL  5-40 mL IntraVENous 2 times per day GRACIELA Rodríguez CNP   10 mL at 01/04/22 0945    sodium chloride flush 0.9 % injection 5-40 mL  5-40 mL IntraVENous PRN Josemanuel Haring, APRN - CNP        0.9 % sodium chloride infusion  25 mL IntraVENous PRN Josemanuel Haring, APRN - CNP        ondansetron (ZOFRAN-ODT) disintegrating tablet 4 mg  4 mg Oral Q8H PRN Josemanuel Haring, APRN - CNP        Or    ondansetron (ZOFRAN) injection 4 mg  4 mg IntraVENous Q6H PRN Josemanuel Haring, APRN - CNP        acetaminophen (TYLENOL) tablet 650 mg  650 mg Oral Q6H PRN Josemanuel Haring, APRN - CNP   650 mg at 12/31/21 2047    Or    acetaminophen (TYLENOL) suppository 650 mg  650 mg Rectal Q6H PRN Josemanuel Haring, APRN - CNP        potassium chloride (KLOR-CON M) extended release tablet 40 mEq  40 mEq Oral PRN Josemanuel Haring, APRN - CNP        Or    potassium bicarb-citric acid (EFFER-K) effervescent tablet 40 mEq  40 mEq Oral PRN Josemanuel Haring, APRN - CNP        Or    potassium chloride 10 mEq/100 mL IVPB (Peripheral Line)  10 mEq IntraVENous PRN Josemanuel Haring, APRN - CNP        senna (SENOKOT) tablet 8.6 mg  1 tablet Oral Daily PRN Josemanuel Haring, APRN - CNP   8.6 mg at 01/01/22 0916    oxyCODONE (ROXICODONE) immediate release tablet 5 mg  5 mg Oral Q6H PRN Josemanuel Haring, APRN - CNP   5 mg at 01/03/22 1026    ipratropium-albuterol (DUONEB) nebulizer solution 1 ampule  1 ampule Inhalation Q4H WA Janet Ribeiro MD   1 ampule at 01/03/22 2003    apixaban (ELIQUIS) tablet 2.5 mg  2.5 mg Oral BID Janet Ribeiro MD   2.5 mg at 01/04/22 9390       Current  Infusions   sodium chloride         Prn Meds  sodium chloride flush, sodium chloride, ondansetron **OR** ondansetron, acetaminophen **OR** acetaminophen, potassium chloride **OR** potassium alternative oral replacement **OR** potassium chloride, senna, oxyCODONE    Radiology Review:  XR LUMBAR SPINE (2-3 VIEWS)   Final Result   Redemonstration of an acute or subacute compression fracture of L1 with   estimated 25% vertebral body height loss.   No significant change in alignment. Multilevel spondylosis. Atherosclerosis of an abdominal aortic aneurysm. See recent CT from   12/29/2021 for more accurate assessment of the abdominal aorta and   recommendations. CT CHEST WO CONTRAST   Final Result   1. New multifocal sub solid consolidations bilaterally. Findings favor   multifocal pneumonia, although recurrent metastatic disease is not excluded. Recommend attention on follow-up exam.   2. Background of moderate emphysema. 3. Osteoporotic compression fracture of L1 with estimated 25% vertebral body   height loss is new since June 2021 and otherwise age indeterminate. No   significant bony retropulsion. RECOMMENDATIONS:   Unavailable         CT LUMBAR SPINE WO CONTRAST   Final Result   1. New acute appearing compression fracture of the L1 vertebral body   2. Degenerative spondylolisthesis at L2-L3 causing moderate spinal stenosis   3. Degenerative disc disease at L4-L5, stable   4.  Increased size of the abdominal aorta aneurysm, measuring      RECOMMENDATIONS:   Fusiform AAA Size:    Follow-up Recommendation1:      2.6-2.9 cm     Every 5 years 2      3.0-3.4 cm       Every 3 years      3.5-3.9 cm      Every 2 years      4.0-4.4 cm     Every 12 months,  recommend vascular consultation      4.5-5.4 cm     Every 6 months,  recommend vascular consultation      >5.5 cm        Referral to vascular specialist   ______________________________________________________________________________   _______ 1 Based on ACR White paper: IJ am Laura Radioli 2013;10:789-794.  2   For aortas of maximum diameter 2.6--2.9 cm meeting the criteria for AAA (>1.5   x proximal normal segment; no f/u if < 1.5 x proximal normal segment; no f/u   for aortas < 2.6 cm)      Note:  AAA enlargement of >0.5 cm in 6 months or >1 cm in 1 year, recommend   vascular consultation Note: Saccular AAA of any size, recommend vascular   consultation         XR CHEST (2 VW)   Final Result   Left post thoracotomy change with ipsilateral volume loss. Right suprahilar   known mass, similar to prior. Separately, both lungs show worsening   opacities which may reflect worsening metastatic lung disease.                ASSESSMENT:  Active diagnoses treated at this admission:  · Acute L1 fracture  · Low back pain  · Metastatic lung cancer: Recent chemotherapy and radiation therapy completed  · History of COPD  · History of chronic congestive heart failure no evidence of current decompensation  · Atrial fibrillation currently rate controlled telemetry and ECG reviewed  · CKD    Problem list:  Patient Active Problem List   Diagnosis    COPD (chronic obstructive pulmonary disease) (HCC)    Hyperlipidemia LDL goal <100    Essential hypertension    Congestive heart failure (CHF) (HCC)    Lung mass    Atrial fibrillation (HCC)    Moderate protein-calorie malnutrition (HCC)    Abdominal aortic aneurysm (AAA) without rupture (HCC)    Chronic systolic congestive heart failure (HCC)    Non-small cell cancer of right lung (HCC)    Malignant neoplasm of lung (HCC)    Compression fracture of first lumbar vertebra with routine healing    Abnormal WBC count - Low blast count noted       PLAN:  Anticoagulation he restarted  Continue diuretic therapy reassess metabolic panel-and renal function  Pain control is adequate  Consultation to neurosurgery reviewed awair brace then eval for mobility  Will require DVT prophylaxis  We will order straight aerosol treatments  Administer single dose of steroid in light of his history  Patient has a history of lung mass and malignant neoplasm of the lung  CT scan reviewed-he completed chemotherapy on 12/14/2021 and radiation therapy on 12/20/2021  Monitor electrolytes closely monitor hydration closely  Reviewed PT and OT evaluations    We will attempt to discharge the patient again today we are awaiting placement  He is clinically stable  We will update his labs while we are waiting for placement  Discussed again with wife    He will require follow-up with oncology as well    See  Orders  Kolton Coffman MD, FACP  10:34 AM  1/4/2022

## 2022-01-04 NOTE — PROGRESS NOTES
Physical Therapy  Physical Therapy      Name: Kyle Sanches  : 1935  MRN: [id number]      Date of Service: 2022    Evaluating PT:  Sadia Little PT, DPT [id number]     Room #:  1424/4973-N  Diagnosis:  Compression fracture of first lumbar vertebra with routine healing [S32.010D]  Nontraumatic compression fracture of L1 vertebra, initial encounter [facility]) [B96.12XW]  Reason for admission: back pain  Precautions:  Falls, TLSO, spinal neutral, L1 compression fx  Procedure/Surgery:  None   Equipment Recommendations:  FWW    SUBJECTIVE:  Pt lives with wife in a 1 story home with 5 DENNIS 2 rails. Pt ambulated with no AD but last week or two uses [employer] PRN PTA. OBJECTIVE:   Initial Evaluation  Date:  Treatment Date: 22   Short Term/ Long Term   Goals   AM-PAC 6 Clicks 66/50 40/88    Was pt agreeable to Eval/treatment? Yes  Yes     Does pt have pain? Back pain with movement Back pain with brace donned; no pain at rest    Bed Mobility  Rolling: Deb  Supine to sit: ModA  Sit to supine: ModA  Scooting: Deb Rolling: Min A  Supine to sit: Max A  Sit to supine: Max A  Scooting: Mod A to EOB Independent    Transfers Sit to stand: Min<>ModA  Stand to sit: Deb  Stand pivot: NT Sit to stand: Min A  Stand to sit: Min A  Stand pivot: Mod A with [id number] assist Independent    Ambulation    10 feet x2 with [employer] Deb   NT >100 feet with [employer] Mod I    Stair negotiation: ascended and descended  NT NT TBD     Pt is A & O x: 4 to person, place, month/year, and situation. Sensation: Pt denies numbness and tingling of extremities. Edema: Unremarkable. Patient education  Pt educated on PT role in acute care setting. Patient response to education:   Pt verbalized understanding Pt demonstrated skill Pt requires further education in this area   Yes NA No     ASSESSMENT:    Comments:    Pt was in bed upon room entry, agreeable to PT treatment.  Pt reported no pain at rest. Pt was assisted with rolling to L and R as he was assisted with donning of brace. Pt reports abdominal pain with brace donned but was agreeable to attempt activity. Pt used log roll technique during supine to sit transfer. Heavy assistance required for both legs and trunk mobility. Pt has fair sitting balance at EOB. Pt complained of increased pain while sitting EOB but was agreeable to stand and pivot to chair. Pt was assisted to chair and was seated. Pt reported more abdominal/brace pain and could not tolerate sitting in chair. Pt requested to return to bed. Pt was assisted back to bed and brace was removed. Pain improved once brace was doffed. Pt was positioned comfortably in bed with pillows. O2 sat was 93-96% on 5 L O2/min throughout session. Wife arrived. All questions and concerns were addressed. Pt was left in bed with all needs met at conclusion of session. Treatment:  Patient practiced and was instructed in the following treatment:     Therapeutic activities:  o Bed mobility: Pt was cued for log roll technique during bed mobility transfers. o Transfers: Pt was cued for hand placement during sit <> stand transfers. Pt completed multiple transfers from surfaces of varying heights (EOB x1, chair x1). o Vitals and symptoms were closely monitored throughout session. o Education: Pt was educated on importance of wearing brace at all times when OOB. PLAN:    Patient is making slow progress towards established goals. Will continue with current POC.       Time in: 1045  Time out: 1110    Total Treatment Time 25 minutes     CPT codes:  [] Gait training 41212 0 minutes  [] Manual therapy 83407 0 minutes  [x] Therapeutic activities 49147 25 minutes  [] Therapeutic exercises 81972 0 minutes  [] Neuromuscular reeducation 94803 0 minutes      Ashli Stafford PT, DPT   [id number]

## 2022-01-04 NOTE — DISCHARGE SUMMARY
Discharge Summary    Jenise Saavedra  :  1935  MRN:  [id number]    Admit date:  2021  Discharge date:  2022 1:36 PM    Admitting Physician:  No admitting provider for patient encounter.     Discharge Diagnoses:    Patient Active Problem List    Diagnosis Date Noted    Intractable back pain 2022    Abnormal WBC count - Low blast count noted 2021    Compression fracture of first lumbar vertebra with routine healing 2021    Malignant neoplasm of lung (HCC)     Non-small cell cancer of right lung ([address] 10/15/2021    Chronic systolic congestive heart failure ([address] 2021    Abdominal aortic aneurysm (AAA) without rupture ([address]     Lung mass 2021    Atrial fibrillation ([address] 2021    Moderate protein-calorie malnutrition (HCC) 2021    Congestive heart failure (CHF) ([address] 06/15/2021    COPD (chronic obstructive pulmonary disease) ([address] 2016    Hyperlipidemia LDL goal <100 2016    Essential hypertension 2016       Past Medical Hx :   Past Medical History:   Diagnosis Date    [facility]-fib [facility])     AAA (abdominal aortic aneurysm) ([address]     measures 5.0 cm    Cancer ([address]     left lung    CHF (congestive heart failure) ([address]     COPD (chronic obstructive pulmonary disease) ([address]     Heart failure ([address]     Hyperlipidemia     Hypertension        Past Surgical Hx :   Past Surgical History:   Procedure Laterality Date    BRONCHOSCOPY N/A 2021    BRONCHOSCOPY W/EBUS FNA performed by David Cuello MD at [place] [address] N/A 2021    BRONCHOSCOPY ADD ON COMPUTER ASSISTED performed by David Cuello MD at [place] [address]  2021    BRONCHOSCOPY/TRANSBRONCHIAL NEEDLE BIOPSY performed by David Cuello MD at [place] [address]  2021    BRONCHOSCOPY ALVEOLAR LAVAGE performed by David Cuello MD at [address] TEST N/A 2021    Lexiscan stress test  COLONOSCOPY      years ago    CT NEEDLE BIOPSY LUNG PERCUTANEOUS  7/1/2021    CT NEEDLE BIOPSY LUNG PERCUTANEOUS 7/1/2021 Julien Reyes MD [facility] CT    LUNG CANCER SURGERY  01/01/2005       Admission Condition:  fair    Discharged Condition:  fair    Labs:  CBC:   Lab Results   Component Value Date    WBC 15.1 01/04/2022    RBC 4.16 01/04/2022    HGB 13.3 01/04/2022    HCT 41.9 01/04/2022    .7 01/04/2022    MCH 32.0 01/04/2022    MCHC 31.7 01/04/2022    RDW 20.3 01/04/2022     01/04/2022    MPV 10.8 01/04/2022     CMP:    Lab Results   Component Value Date     01/01/2022    K 4.4 01/01/2022    CL 97 01/01/2022    CO2 28 01/01/2022    BUN 67 01/01/2022    CREATININE 2.1 01/01/2022    GFRAA 36 01/01/2022    LABGLOM 30 01/01/2022    GLUCOSE 120 01/01/2022    PROT 5.1 12/30/2021    LABALBU 2.9 12/30/2021    CALCIUM 8.9 01/01/2022    BILITOT 1.3 12/30/2021    ALKPHOS 88 12/30/2021    AST 21 12/30/2021    ALT 21 12/30/2021     BMP:    Lab Results   Component Value Date     01/01/2022    K 4.4 01/01/2022    CL 97 01/01/2022    CO2 28 01/01/2022    BUN 67 01/01/2022    LABALBU 2.9 12/30/2021    CREATININE 2.1 01/01/2022    CALCIUM 8.9 01/01/2022    GFRAA 36 01/01/2022    LABGLOM 30 01/01/2022    GLUCOSE 120 01/01/2022        Radiology Results: XR CHEST (2 VW)    Result Date: 12/29/2021  EXAMINATION: TWO XRAY VIEWS OF THE CHEST 12/29/2021 3:55 pm COMPARISON: Chest x-ray 11/20/2021, PET-CT 09/20/2021 and CT chest 08/24/2021 HISTORY: ORDERING SYSTEM PROVIDED HISTORY: Shortness of breath TECHNOLOGIST PROVIDED HISTORY: Reason for exam:->Shortness of breath What reading provider will be dictating this exam?->CRC FINDINGS: Known malignancy and with a persistent right suprahilar ill-defined lesion. Ill-defined parenchymal or pleural based densities at the mid and lower right lung and which are nonspecific. Left post thoracotomy change with ipsilateral volume loss.   A partially obscured 3 cm nodule at the left lung base may be present. The heart appears unchanged in size. No pneumothorax. Left post thoracotomy change with ipsilateral volume loss. Right suprahilar known mass, similar to prior. Separately, both lungs show worsening opacities which may reflect worsening metastatic lung disease. CT CHEST WO CONTRAST    Result Date: 12/30/2021  EXAMINATION: CT OF THE CHEST WITHOUT CONTRAST 12/30/2021 7:18 am TECHNIQUE: CT of the chest was performed without the administration of intravenous contrast. Multiplanar reformatted images are provided for review. Dose modulation, iterative reconstruction, and/or weight based adjustment of the mA/kV was utilized to reduce the radiation dose to as low as reasonably achievable. COMPARISON: June 16, 2021 HISTORY: ORDERING SYSTEM PROVIDED HISTORY: worsening metastatic lung disease? TECHNOLOGIST PROVIDED HISTORY: Reason for exam:->worsening metastatic lung disease? What reading provider will be dictating this exam?->CRC FINDINGS: Mediastinum: The heart is enlarged. No significant change in appearance of the aorta with atherosclerosis. No pericardial effusion. Few subcentimeter mediastinal lymph nodes are unchanged. Lungs/pleura: New multifocal sub solid consolidations in the upper lobes and right lower lobe. Findings favor multifocal pneumonia, although new foci of metastatic disease not excluded. The largest sub solid area measures 2.8 x 2.8 cm in the right upper lobe. Small right pleural effusion with mild bibasilar atelectasis. No pneumothorax. Moderate emphysema. Upper Abdomen: Stable low attenuating right adrenal adenoma. Stable perinephric stranding in the upper abdomen. Soft Tissues/Bones: Soft tissue thickening and edema with a subcutaneous fluid collection over the left flank, similar to prior study. Correlate for any history of trauma. No new subtle compression fracture of L1, new since June 16 and otherwise age indeterminate.   Diminished bony mineralization with spondylosis of the spine. 1. New multifocal sub solid consolidations bilaterally. Findings favor multifocal pneumonia, although recurrent metastatic disease is not excluded. Recommend attention on follow-up exam. 2. Background of moderate emphysema. 3. Osteoporotic compression fracture of L1 with estimated 25% vertebral body height loss is new since June 2021 and otherwise age indeterminate. No significant bony retropulsion. RECOMMENDATIONS: Unavailable     CT LUMBAR SPINE WO CONTRAST    Result Date: 12/29/2021  EXAMINATION: CT OF THE LUMBAR SPINE WITHOUT CONTRAST  12/29/2021 TECHNIQUE: CT of the lumbar spine was performed without the administration of intravenous contrast. Multiplanar reformatted images are provided for review. Adjustment of mA and/or kV according to patient size was utilized. Dose modulation, iterative reconstruction, and/or weight based adjustment of the mA/kV was utilized to reduce the radiation dose to as low as reasonably achievable. COMPARISON: 07/15/2021 HISTORY: ORDERING SYSTEM PROVIDED HISTORY: back pain, no trauma TECHNOLOGIST PROVIDED HISTORY: Reason for exam:->back pain, no trauma Decision Support Exception - unselect if not a suspected or confirmed emergency medical condition->Emergency Medical Condition (MA) What reading provider will be dictating this exam?->CRC FINDINGS: BONES/ALIGNMENT: The study reveals a 3 mm anterolisthesis of L2 on L3. There is associated disc height loss and vacuum phenomena compatible with degenerative disc disease. There is central disc bulging at this region. Due to the associated hypertrophy of the of the ligamentum flavum there is moderate spinal stenosis. Correlation with CT with myelography or MRI through this region may be helpful to determine the extent of narrowing. There is a new compression deformity involving the superior endplate of L1.  This appears acute and correlation with fixed back pain with movement may be helpful to determine the significance of this finding. There is disc height loss at the L4-L5 and L5-S1 level with that appears stable. No osseous destructive lesion is seen. Donzell Pyo SOFT TISSUES/RETROPERITONEUM: No paraspinal mass is seen. The abdominal aorta reveals fusiform aneurysmal change. There is increased dilation of the distal abdominal aorta measuring 5.4 cm in transverse diameter as compared with 5.0  mm on the study of 07/15/2021     1. New acute appearing compression fracture of the L1 vertebral body 2. Degenerative spondylolisthesis at L2-L3 causing moderate spinal stenosis 3. Degenerative disc disease at L4-L5, stable 4. Increased size of the abdominal aorta aneurysm, measuring RECOMMENDATIONS: Fusiform AAA Size:    Follow-up Recommendation1: 2.6-2.9 cm     Every 5 years 2 3.0-3.4 cm       Every 3 years 3.5-3.9 cm      Every 2 years 4.0-4.4 cm     Every 12 months,  recommend vascular consultation 4.5-5.4 cm     Every 6 months,  recommend vascular consultation >5.5 cm        Referral to vascular specialist ______________________________________________________________________________ _______ 1 Based on ACR White paper: IJ am Laura Radioli 0580;91:[id number].  2 For aortas of maximum diameter 2.6--2.9 cm meeting the criteria for AAA (>1.5 x proximal normal segment; no f/u if < 1.5 x proximal normal segment; no f/u for aortas < 2.6 cm) Note:  AAA enlargement of >0.5 cm in 6 months or >1 cm in 1 year, recommend vascular consultation Note: Saccular AAA of any size, recommend vascular consultation       Hospital Course:  History obtained from multiple sources, chart review patient interview. This 55-year-old male with a history of lung cancer and chemotherapy coupled with radiation therapy presents with complaint of low back pain. There is no specific trauma associated with this however he stated he was bending down when he had the onset of pain about a week ago.   He actually denies shortness of breath but is significantly dyspneic on speech.     Initial emergency room evaluation:  BUN 35 creatinine 1.9  High-sensitivity troponin was elevated but delta troponin was negative  INR 2.2     Lumbar spine CT:  New compression fracture L1 vertebral body  Moderate spinal stenosis  Increasing abdominal aortic aneurysm to 5.4 cm     Chest x-ray post left thoracotomy  Right suprahilar mass unchanged  Worsening appearance of metastatic lung disease  Vitals appear to be normal  Patient is on 2 L nasal cannula saturating at 99% although dyspneic on speech     12/31  Neurosurgical evaluation:  Pain control and brace  Patient stated his breathing is at baseline  Labs are stable  CKD appears a bit worse     1/1/2022  Patient is stable   his brace Has been placed he says it is extremely tight   He has not had a physical therapy evaluation following the brace  We are awaiting his evaluation in order to see his next venue of care  Labs have not been updated  Vitals are normal and he remains on 2 L nasal cannula saturating at 94%   he denies feeling any shortness of breath and feels that he is at baseline     01/02/2022  Patient states his back feels sore  He has difficulty ambulating  He also requires 2 L nasal cannula  He denies any chest pain or pressure sensation     1/3/2022  Patient feels comfortable  He is still on nasal cannula oxygen  I had requested evaluation for the next venue of care  Oxygen requirement has not been established  We will again request social service today along with PT and OT recommendations so that I may discharge this patient     1/4/2022  Patient is awake alert cognizant  He feels well  He is still on nasal cannula oxygen  Social service is planning location for discharge  He is medically stable     Discharge Medications:      Medication List      START taking these medications    ipratropium-albuterol 0.5-2.5 (3) MG/3ML Soln nebulizer solution  Commonly known as: DUONEB  Inhale 3 mLs into the lungs every 4 hours (while awake)  Replaces: albuterol-ipratropium  MCG/ACT Aers inhaler        CHANGE how you take these medications    apixaban 2.5 MG Tabs tablet  Commonly known as: ELIQUIS  Take 1 tablet by mouth 2 times daily  What changed:   · medication strength  · how much to take        CONTINUE taking these medications    bumetanide 1 MG tablet  Commonly known as: BUMEX  Take 1 tablet by mouth daily     dilTIAZem 120 MG extended release capsule  Commonly known as: CARDIZEM CD  Take 1 capsule by mouth daily     docusate sodium 100 MG capsule  Commonly known as: Colace  Take 1 capsule by mouth 2 times daily as needed for Constipation     isosorbide mononitrate 30 MG extended release tablet  Commonly known as: IMDUR  Take 2 tablets by mouth daily     lactulose 10 g packet  Commonly known as: CEPHULAC     metoprolol succinate 100 MG extended release tablet  Commonly known as: TOPROL XL  Take 1 tablet by mouth 2 times daily     ondansetron 4 MG disintegrating tablet  Commonly known as: ZOFRAN-ODT  Take 1 tablet by mouth every 8 hours as needed for Nausea or Vomiting     Procto-Med HC 2.5 % Crea rectal cream  Generic drug: hydrocortisone     Vitamin D3 50 MCG (2000 UT) Caps     zinc oxide 40 % Pste paste  Commonly known as: Desitin Maximum Strength  Apply a pea size amount to the anal area as needed for irritation for up to 10 days        STOP taking these medications    albuterol-ipratropium  MCG/ACT Aers inhaler  Commonly known as: COMBIVENT RESPIMAT  Replaced by: ipratropium-albuterol 0.5-2.5 (3) MG/3ML Soln nebulizer solution     atorvastatin 10 MG tablet  Commonly known as: LIPITOR     prochlorperazine 10 MG tablet  Commonly known as: COMPAZINE           Where to Get Your Medications      Information about where to get these medications is not yet available    Ask your nurse or doctor about these medications  · apixaban 2.5 MG Tabs tablet  · ipratropium-albuterol 0.5-2.5 (3) MG/3ML Soln nebulizer solution Discharge Exam:  General appearance: Frail appearing awake, alert no distress. Less Dyspneic on speech  Skin: Color, texture, turgor normal. No rashes or lesions. Eyes: Conjunctivae/cornea clear. Enolia Wolfgang. Sclera non icteric. Neck:  Symmetric. No adenopathy. Lungs: Breath sounds are harsh but clear, no evidence of focal abnormality, reduced ventilation associated with positioning and some back pain  Heart: S1 > S2. Rhythm is regular and rate is normal. No gallop rub or murmur. Extremities: No deformities, edema, or skin discoloration. Musculoskeletal: As noted above low back pain reproducible by percussion  Neuro:   · Grossly normal  Mental status: Awake, alert, cognizant of person, place, time.     Patient appears capable of directing self care   Mood: Normal and appropriate affect  Gait & balance: not assessed: Assisted      Disposition: SNF    Patient Instructions:   REFER TO AVR or JUWAN document    Signed:  Wero Chung  [facility]  American Board of Geriatric Medicine  1/4/2022, 1:36 PM

## 2022-01-05 NOTE — CARE COORDINATION
SW notified late last evening that [address] did not have staff for patient to go last evening, Rishabh Ford had spoken with CM manager Pike Solders and requested discharge be held, Floor cancelled transport. Have arranged transport this am for 10am through [facility]. Have notified wife, Alexandre North and Marylee Gamma from [place].

## 2022-01-07 PROBLEM — J18.9 PNEUMONIA: Status: ACTIVE | Noted: 2022-01-01

## 2022-01-07 PROBLEM — R55 SYNCOPE: Status: ACTIVE | Noted: 2022-01-01

## 2022-01-07 NOTE — LETTER
[address] [place] [address] Dr Department Medicaid  CERTIFICATION OF NECESSITY  FOR NON-EMERGENCY TRANSPORTATION   BY GROUND AMBULANCE      Individual Information   1. Name:  2. [address] [place] [address] Dr Medicaid Billing Number:    3. Address:      Transportation Provider Information   4. Provider Name:    5. [address] [place] [address] Dr Medicaid Provider Number:  National Provider Identifier (NPI):      Certification  7. Criteria:  During transport, this individual requires:  [] Medical treatment or continuous     supervision by an EMT. [] The administration or regulation of oxygen by another person. [] Supervised protective restraint. 8. Period Beginning Date:    5. Length  [] Not more than ___ day(s)  [] One Year     Additional Information Relevant to Certification   10. Comments or Explanations, If Necessary or Appropriate            Certifying Practitioner Information   11. Name of Practitioner:    [address] [place] [address] Dr Medicaid Provider Number, If Applicable:  [address] Provider Identifier (NPI):      Signature Information   14. Date of Signature:  13. Name of Person Signin.  Signature and Professional Designation:                                      Discharge Planner     GO [id number]  Rev. 2015

## 2022-01-07 NOTE — LETTER
[place] Department Medicaid  CERTIFICATION OF NECESSITY  FOR NON-EMERGENCY TRANSPORTATION   BY GROUND AMBULANCE      Individual Information   1. Name: Elan Bland 2. [place] Medicaid Billing Number:    3. Address: [place] [address]  [address] [facility]      Transportation Provider Information   4. Provider Name:    5. [place] Medicaid Provider Number:  National Provider Identifier (NPI):      Certification  7. Criteria:  During transport, this individual requires:  [x] Medical treatment or continuous     supervision by an EMT. [x] The administration or regulation of oxygen by another person. [] Supervised protective restraint. 8. Period Beginning Date: 01/ ____/22   9. Length  [x] Not more than 1 day  [] One Year     Additional Information Relevant to Certification   10. 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 non-small cell cancer of right lung with mets     Certifying Practitioner Information   11. Name of Practitioner: DR Kyle Obrien MD   12. [place] Medicaid Provider Number, If Applicable:  [address] Provider Identifier (NPI):      Signature Information   14. Date of Signature: 1/8/22 15. Name of Person Signing: Mayo CHAMBERS   16. Signature and Professional Designation: Angie CHAMBERS discharge planner     [facility] [id number]  Rev. 7/2015                        [address] Encounter Date/Time: 1/7/2022 [place] Account: [de-identified]    MRN: [id number]    Patient: Elan Bland    Contact Serial #: [id number]      ENCOUNTER          Patient Class: I Private Enc? No Unit RM BD: R [address] Service:  INM   Encounter DX: Syncope and collapse [B3*   ADM Provider: Valeria Tovar MD   Procedure:     ATT Provider: Kyle Obrien MD   REF Provider:        Admission DX: Syncope and collapse, Pneumonia, Acute urinary retention, Acute on chronic respiratory failure with hypoxia ([facility] Pneumonia due to infectious organism, unspecified laterality, unspecified part of lung, Abdominal aortic aneurysm (AAA) without rupture ([facility] and DX codes: R55, J18.9, R33.8, J96.21, J18.9, I71.4      PATIENT                 Name: Ronald Page : 1935 (86 yrs)   Address: [address] [place] [address] Sex: Male   [facility] [address]         Marital Status:    Employer: RETIRED         [employer]: None   Primary Care Provider: María Vivas MD         Primary Phone: [phone]   EMERGENCY CONTACT   Contact Name Legal Guardian? Relationship to Patient Home Phone Work Phone   1. Ashlyn Barlow  2. Yonatan Sanchez      Spouse  Niece/Nephew [phone]                 GUARANTOR  Guarantor: Ronald Page     : 1935   Address: [place] [address] Sex: Male   [facility] [address]     Relation to Patient: Self       Home Phone: [phone]   Guarantor ID: [id number]       Work Phone:     Guarantor Employer: RETIRED         Status: RETIRED      COVERAGE        PRIMARY INSURANCE   Payor: MEDICARE Plan: MEDICARE PART A AND B   Payor Address: [facility] [address],  [facility] [address], [facility] [address]       Group Number:   Insurance Type: INDEMNITY   Subscriber Name: Sarabjit Amador : 1935   Subscriber ID: [id number] Shaina Escalante. Rel. to Sub: Self   SECONDARY INSURANCE   Payor: BCBS Plan: ANTHEM MEDICARE SUPP   Payor Address:   [id number], [address] [place] [address] Drive          Group Number: [id number] Insurance Type: INDEMNITY   Subscriber Name: Sarabjit Corderoo : 1935   Subscriber ID: [id number] Shaina Escalante.  Rel. to Sub: SELF           CSN: [id number]

## 2022-01-07 NOTE — ED NOTES
Bed: 02  Expected date:   Expected time:   Means of arrival:   Comments:  ems     Jake Elder RN  01/07/22 [id number]

## 2022-01-07 NOTE — ED NOTES
Was called into room by nurse pt return from ct and unable to get pulse ox or blood pressure, pt not really responsive. Nurse and dr. Bobby Sanchez to room ekg completed, abgs completed. Pt placed on monitor.      Alondra Blackwood RN  01/07/22 [id number]

## 2022-01-07 NOTE — ED PROVIDER NOTES
ED  Provider Note  Admit Date/RoomTime: 1/7/2022  4:48 PM  ED Room: 02/02     HPI:   Elise Cole is a 81 y.o. male presenting to the ED for syncope, beginning hours ago. History comes primarily from the patient and Family. Patient Active Problem List:     COPD (chronic obstructive pulmonary disease) ([facility] [address]     Hyperlipidemia LDL goal <100     Essential hypertension     Congestive heart failure (CHF) (HCC)     Lung mass     Atrial fibrillation (HCC)     Moderate protein-calorie malnutrition (HCC)     Abdominal aortic aneurysm (AAA) without rupture (HCC)     Chronic systolic congestive heart failure (HCC)     Non-small cell cancer of right lung (HCC)     Malignant neoplasm of lung (HCC)     Compression fracture of first lumbar vertebra with routine healing     Abnormal WBC count - Low blast count noted     Intractable back pain  . The complaint has been persistent, moderate in severity, improved by nothing and worsened by nothing. Associated symptoms include none. Teena Fenton has a history of lung cancer for which she is receiving chemotherapy and radiation. He has a recent vertebral compression fracture which was assessed by neurosurgery and was deemed to be nonoperative. The patient is undergoing rehabilitation for this compression fracture at a skilled nursing facility. Over the course the last several weeks he has newly developed a requirement for supplemental oxygen, and his oral intake has progressively decreased in both food and fluid. Today he stood up to go to the restroom, and became lightheaded and collapsed, falling in the bathroom. For this reason the patient was transported to [facility] emergency department for further evaluation and treatment. On arrival, the patient was assessed with history, physical exam, imaging studies, vital signs.   Vital signs were notable on arrival for a low normal blood pressure with preserved MAP and the patient was afebrile. Review of Systems   Constitutional: Positive for activity change and fatigue. Negative for chills and fever. HENT: Negative for ear pain, sinus pressure and sore throat. Eyes: Negative for pain, discharge and redness. Respiratory: Negative for cough, shortness of breath and wheezing. Cardiovascular: Negative for chest pain. Gastrointestinal: Positive for abdominal distention. Negative for abdominal pain, diarrhea, nausea and vomiting. Genitourinary: Negative for dysuria and frequency. Musculoskeletal: Negative for arthralgias and back pain. Skin: Negative for rash and wound. Neurological: Negative for weakness and headaches. Hematological: Negative for adenopathy. All other systems reviewed and are negative. Physical Exam  Constitutional:       General: He is not in acute distress. Appearance: He is well-developed. He is not diaphoretic. HENT:      Head: Normocephalic and atraumatic. Mouth/Throat:      Dentition: Abnormal dentition. Eyes:      Pupils: Pupils are equal, round, and reactive to light. Neck:      Vascular: No JVD. Trachea: No tracheal deviation. Cardiovascular:      Rate and Rhythm: Regular rhythm. Heart sounds: No murmur heard. No friction rub. No gallop. Pulmonary:      Effort: Pulmonary effort is normal. No respiratory distress. Breath sounds: Normal breath sounds. No stridor. No wheezing or rales. Chest:      Chest wall: No tenderness. Abdominal:      General: Bowel sounds are normal. There is no distension. Palpations: Abdomen is soft. Tenderness: There is no abdominal tenderness. There is no guarding. Genitourinary:     Comments: Significantly distended bladder easily visible through the patient suprapubic abdominal space. This was decompressed with a Alejandra catheter  Musculoskeletal:         General: Normal range of motion. Cervical back: Normal range of motion.    Skin:     General: Skin is warm and dry. Capillary Refill: Capillary refill takes less than 2 seconds. Neurological:      Mental Status: He is alert. Cranial Nerves: No cranial nerve deficit. Psychiatric:         Behavior: Behavior normal.          Procedures     MDM  Number of Diagnoses or Management Options  Diagnosis management comments: Emergency department evaluation was notable for findings of pneumonia, urinary retention and hypoxic respiratory failure requiring increasing supplemental oxygen in a patient with lung cancer who had a syncopal event at his rehabilitation facility. Course was complicated by the fact that the patient was found to have a increase in the size of his abdominal aortic aneurysm and there was some initial concern that he may be having a syncopal event secondary to a leak related to this aneurysm. Imaging studies revealed that this was not the case. Patient was discussed with vascular surgery who deemed that the patient was not a candidate for intervention at this point time. This information was relayed to the patient who understood this plan of care and was amenable to the plan. Patient was discussed with the admitting service (Dr. See Gallegos) who concurred with the decision for admission, and have agreed to admit the patient to intermediate. ED Course as of 01/11/22 1610   Fri Jan 07, 2022   1824 Called the patient's bedside out of concern for possible unresponsiveness. It is been difficult to obtain the patient's vital signs since he has been here secondary to poor waveforms for his pulse ox. Blood pressure remained stable. Temperature 1 finally obtained via temperature-sensing Alejandra was 97°. Patient had a large, bulbous suprapubic mass. Fast exam performed which revealed no free fluid in the abdomen. Mass was very distended bladder. This was decompressed with a Alejandra catheter with significant improvement in the patient's discomfort.   Patient remained alert and conversive during this time. [RK]   1622 Patient's CODE STATUS was discussed with wife who was present at the bedside. Patient is a DNR Comfort Care arrest. [RK]   2023 CT abdomen pelvis reveals interval worsening of the patient's known abdominal aortic aneurysm with new development of hematoma around the widest area. Reassessing with CT angiography. [RK]      ED Course User Index  [RK] Sade [address] DO     ED Course as of 01/11/22 1610   Fri Jan 07, 2022   1824 Called the patient's bedside out of concern for possible unresponsiveness. It is been difficult to obtain the patient's vital signs since he has been here secondary to poor waveforms for his pulse ox. Blood pressure remained stable. Temperature 1 finally obtained via temperature-sensing Alejandra was 97°. Patient had a large, bulbous suprapubic mass. Fast exam performed which revealed no free fluid in the abdomen. Mass was very distended bladder. This was decompressed with a Alejandra catheter with significant improvement in the patient's discomfort. Patient remained alert and conversive during this time. [RK]   1252 Patient's CODE STATUS was discussed with wife who was present at the bedside. Patient is a DNR Comfort Care arrest. [RK]   2023 CT abdomen pelvis reveals interval worsening of the patient's known abdominal aortic aneurysm with new development of hematoma around the widest area. Reassessing with CT angiography. [RK]      ED Course User Index  [RK] Yair Rodriguez, DO       --------------------------------------------- PAST HISTORY ---------------------------------------------  Past Medical History:  has a past medical history of A-fib ([facility] [address] AAA (abdominal aortic aneurysm) ([facility] [address] Cancer ([facility] [address] CHF (congestive heart failure) ([facility] [address] COPD (chronic obstructive pulmonary disease) ([facility] [address] Heart failure ([facility] [address] Hyperlipidemia, and Hypertension.     Past Surgical History:  has a past surgical history that includes Lung cancer surgery (01/01/2005); cardiovascular stress test (N/A, 06/16/2021); CT NEEDLE BIOPSY LUNG PERCUTANEOUS (7/1/2021); bronchoscopy (N/A, 9/1/2021); bronchoscopy (N/A, 9/1/2021); bronchoscopy (9/1/2021); bronchoscopy (9/1/2021); and Colonoscopy. Social History:  reports that he quit smoking about 20 years ago. His smoking use included cigarettes. He has a 10.00 pack-year smoking history. He has never used smokeless tobacco. He reports previous alcohol use. He reports that he does not use drugs. Family History: family history includes Breast Cancer in his sister; Cancer in his sister; Heart Disease in his brother. The patients home medications have been reviewed. Allergies: Patient has no known allergies.     -------------------------------------------------- RESULTS -------------------------------------------------    LABS:  Results for orders placed or performed during the hospital encounter of 01/07/22   Culture, Blood 2    Specimen: Blood   Result Value Ref Range    Culture, Blood 2 24 Hours no growth    Culture, Blood 1    Specimen: Blood   Result Value Ref Range    Blood Culture, Routine 24 Hours no growth    COVID-19, Rapid    Specimen: Nasopharyngeal Swab   Result Value Ref Range    SARS-CoV-2, NAAT Not Detected Not Detected   COVID-19, Rapid    Specimen: Nasopharyngeal Swab; Nasal   Result Value Ref Range    SARS-CoV-2, NAAT Not Detected Not Detected   Comprehensive Metabolic Panel   Result Value Ref Range    Sodium 139 132 - 146 mmol/L    Potassium 4.3 3.5 - 5.0 mmol/L    Chloride 95 (L) 98 - 107 mmol/L    CO2 28 22 - 29 mmol/L    Anion Gap 16 7 - 16 mmol/L    Glucose 125 (H) 74 - 99 mg/dL    BUN 62 (H) 6 - 23 mg/dL    CREATININE 1.9 (H) 0.7 - 1.2 mg/dL    GFR Non-African American 34 >=60 mL/min/1.73    GFR African American 41     Calcium 9.0 8.6 - 10.2 mg/dL    Total Protein 5.5 (L) 6.4 - 8.3 g/dL    Albumin 2.7 (L) 3.5 - 5.2 g/dL    Total Bilirubin 3.0 (H) 0.0 - 1.2 mg/dL    Alkaline Phosphatase 120 40 - 129 U/L     (H) 0 - 40 U/L  (H) 0 - 39 U/L   CBC Auto Differential   Result Value Ref Range    WBC 14.0 (H) 4.5 - 11.5 E9/L    RBC 4.57 3.80 - 5.80 E12/L    Hemoglobin 14.7 12.5 - 16.5 g/dL    Hematocrit 46.7 37.0 - 54.0 %    .2 (H) 80.0 - 99.9 fL    MCH 32.2 26.0 - 35.0 pg    MCHC 31.5 (L) 32.0 - 34.5 %    RDW 20.2 (H) 11.5 - 15.0 fL    Platelets 349 895 - 655 E9/L    MPV 11.4 7.0 - 12.0 fL    Neutrophils % 87.0 (H) 43.0 - 80.0 %    Lymphocytes % 5.0 (L) 20.0 - 42.0 %    Monocytes % 7.0 2.0 - 12.0 %    Eosinophils % 0.0 0.0 - 6.0 %    Basophils % 0.0 0.0 - 2.0 %    Neutrophils Absolute 12.32 (H) 1.80 - 7.30 E9/L    Lymphocytes Absolute 0.70 (L) 1.50 - 4.00 E9/L    Monocytes Absolute 0.98 (H) 0.10 - 0.95 E9/L    Eosinophils Absolute 0.00 (L) 0.05 - 0.50 E9/L    Basophils Absolute 0.00 0.00 - 0.20 E9/L    Myelocyte Percent 1.0 0 - 0 %    nRBC 2.0 /100 WBC    Anisocytosis 2+     Polychromasia 1+     Poikilocytes 2+     Schistocytes 1+     Ovalocytes 2+    D-Dimer, Quantitative   Result Value Ref Range    D-Dimer, Quant 1218 ng/mL DDU   Urinalysis with Microscopic   Result Value Ref Range    Color, UA Yellow Straw/Yellow    Clarity, UA Clear Clear    Glucose, Ur Negative Negative mg/dL    Bilirubin Urine Negative Negative    Ketones, Urine Negative Negative mg/dL    Specific Gravity, UA 1.015 1.005 - 1.030    Blood, Urine MODERATE (A) Negative    pH, UA 5.0 5.0 - 9.0    Protein, UA Negative Negative mg/dL    Urobilinogen, Urine 0.2 <2.0 E.U./dL    Nitrite, Urine Negative Negative    Leukocyte Esterase, Urine Negative Negative    WBC, UA NONE 0 - 5 /HPF    RBC, UA 5-10 (A) 0 - 2 /HPF    Bacteria, UA FEW (A) None Seen /HPF   Blood Gas, Arterial   Result Value Ref Range    Date Analyzed [id number]     Time Analyzed 1811     Source: Blood Arterial     pH, Blood Gas 7.467 (H) 7.350 - 7.450    PCO2 35.7 35.0 - 45.0 mmHg    PO2 79.2 75.0 - 100.0 mmHg    HCO3 25.2 22.0 - 26.0 mmol/L    B.E. 1.9 -3.0 - 3.0 mmol/L    O2 Sat 95.6 92.0 - 98.5 %    O2Hb 94.1 94.0 - 97.0 %    COHb 1.3 0.0 - 1.5 %    MetHb 0.3 0.0 - 1.5 %    O2 Content 19.2 mL/dL    HHb 4.3 0.0 - 5.0 %    tHb (est) 14.5 11.5 - 16.5 g/dL    Mode HFNC   8L     Date Of Collection      Time Collected      Pt Temp 37.0 C     ID [id number]     Lab [id number]     Critical(s) Notified .  No Critical Values    SPECIMEN REJECTION   Result Value Ref Range    Rejected Test trp     Reason for Rejection see below    Troponin   Result Value Ref Range    Troponin, High Sensitivity 39 (H) 0 - 11 ng/L   Lipase   Result Value Ref Range    Lipase 11 (L) 13 - 60 U/L   Basic Metabolic Panel w/ Reflex to MG   Result Value Ref Range    Sodium 145 132 - 146 mmol/L    Potassium reflex Magnesium 3.4 (L) 3.5 - 5.0 mmol/L    Chloride 99 98 - 107 mmol/L    CO2 29 22 - 29 mmol/L    Anion Gap 17 (H) 7 - 16 mmol/L    Glucose 105 (H) 74 - 99 mg/dL    BUN 63 (H) 6 - 23 mg/dL    CREATININE 1.8 (H) 0.7 - 1.2 mg/dL    GFR Non-African American 36 >=60 mL/min/1.73    GFR African American 44     Calcium 8.7 8.6 - 10.2 mg/dL   CBC auto differential   Result Value Ref Range    WBC 16.5 (H) 4.5 - 11.5 E9/L    RBC 4.19 3.80 - 5.80 E12/L    Hemoglobin 13.3 12.5 - 16.5 g/dL    Hematocrit 42.4 37.0 - 54.0 %    .2 (H) 80.0 - 99.9 fL    MCH 31.7 26.0 - 35.0 pg    MCHC 31.4 (L) 32.0 - 34.5 %    RDW 20.1 (H) 11.5 - 15.0 fL    Platelets 099 806 - 752 E9/L    MPV 11.4 7.0 - 12.0 fL    Neutrophils % 87.6 (H) 43.0 - 80.0 %    Immature Granulocytes % 3.1 0.0 - 5.0 %    Lymphocytes % 2.3 (L) 20.0 - 42.0 %    Monocytes % 6.8 2.0 - 12.0 %    Eosinophils % 0.0 0.0 - 6.0 %    Basophils % 0.2 0.0 - 2.0 %    Neutrophils Absolute 14.44 (H) 1.80 - 7.30 E9/L    Immature Granulocytes # 0.51 E9/L    Lymphocytes Absolute 0.38 (L) 1.50 - 4.00 E9/L    Monocytes Absolute 1.13 (H) 0.10 - 0.95 E9/L    Eosinophils Absolute 0.00 (L) 0.05 - 0.50 E9/L    Basophils Absolute 0.04 0.00 - 0.20 E9/L    Anisocytosis 1+     Polychromasia 1+     Poikilocytes 1+ Acanthocytes 1+     Brigette Cells 1+     Ovalocytes 1+    Magnesium   Result Value Ref Range    Magnesium 2.3 1.6 - 2.6 mg/dL   CBC   Result Value Ref Range    WBC 15.0 (H) 4.5 - 11.5 E9/L    RBC 4.06 3.80 - 5.80 E12/L    Hemoglobin 13.2 12.5 - 16.5 g/dL    Hematocrit 41.6 37.0 - 54.0 %    .5 (H) 80.0 - 99.9 fL    MCH 32.5 26.0 - 35.0 pg    MCHC 31.7 (L) 32.0 - 34.5 %    RDW 20.4 (H) 11.5 - 15.0 fL    Platelets 303 824 - 325 E9/L    MPV 11.6 7.0 - 12.0 fL   Comprehensive metabolic panel   Result Value Ref Range    Sodium 144 132 - 146 mmol/L    Potassium 3.2 (L) 3.5 - 5.0 mmol/L    Chloride 104 98 - 107 mmol/L    CO2 29 22 - 29 mmol/L    Anion Gap 11 7 - 16 mmol/L    Glucose 125 (H) 74 - 99 mg/dL    BUN 71 (H) 6 - 23 mg/dL    CREATININE 1.9 (H) 0.7 - 1.2 mg/dL    GFR Non-African American 34 >=60 mL/min/1.73    GFR African American 41     Calcium 7.4 (L) 8.6 - 10.2 mg/dL    Total Protein 4.2 (L) 6.4 - 8.3 g/dL    Albumin 2.0 (L) 3.5 - 5.2 g/dL    Total Bilirubin 1.6 (H) 0.0 - 1.2 mg/dL    Alkaline Phosphatase 102 40 - 129 U/L     (H) 0 - 40 U/L     (H) 0 - 39 U/L   CBC   Result Value Ref Range    WBC 18.7 (H) 4.5 - 11.5 E9/L    RBC 4.40 3.80 - 5.80 E12/L    Hemoglobin 14.0 12.5 - 16.5 g/dL    Hematocrit 44.9 37.0 - 54.0 %    .0 (H) 80.0 - 99.9 fL    MCH 31.8 26.0 - 35.0 pg    MCHC 31.2 (L) 32.0 - 34.5 %    RDW 20.4 (H) 11.5 - 15.0 fL    Platelets 264 293 - 260 E9/L    MPV 11.5 7.0 - 12.0 fL   CBC   Result Value Ref Range    WBC 17.9 (H) 4.5 - 11.5 E9/L    RBC 4.20 3.80 - 5.80 E12/L    Hemoglobin 13.5 12.5 - 16.5 g/dL    Hematocrit 43.2 37.0 - 54.0 %    .9 (H) 80.0 - 99.9 fL    MCH 32.1 26.0 - 35.0 pg    MCHC 31.3 (L) 32.0 - 34.5 %    RDW 20.7 (H) 11.5 - 15.0 fL    Platelets 593 206 - 249 E9/L    MPV 11.8 7.0 - 12.0 fL   EKG 12 Lead   Result Value Ref Range    Ventricular Rate 88 BPM    Atrial Rate 115 BPM    QRS Duration 106 ms    Q-T Interval 358 ms    QTc Calculation (Bazett) 433 ms R Axis 31 degrees    T Axis -126 degrees       RADIOLOGY:  CTA CHEST W CONTRAST   Final Result   Addendum 1 of 1   ADDENDUM:   With respect to the enlarging infrarenal aortic aneurysm, recommendations    the   American Journal Radiology enlargement greater > 0.5 cm in 6 months, as is   the case with this patient should be followed up with vascular surgery   consultation. Fusiform AAA Size: Follow-up Recommendation      2.6-2.9 cm Every 5 years      3.0-3.4 cm Every 3 years      3.5-3.9 cm Every 2 years      4.0-4.4 cm Every 12 months, Recommend vascular consultation      4.5-5.4 cm Every 6 months, Recommend vascular consultation >5.5 cm    Referral   to vascular specialist   ___________________________________________________________________________   ___   _______ Based on ACR White paper: J Am Laura Radiol 2013;10:789-794. 2 For   aortas of maximum diameter 2.6--2.9 cm meeting the criteria for AAA (>1.5    x   proximal normal segment; no f/u if 0.5 cm in 6 months or >1 cm in 1 year,   recommend vascular consultation      Note: Saccular AAA of any size, recommend vascular consultation      Note: AAA enlargement of >0.5 cm in 6 months or >1 cm in 1 year, recommend   vascular consultation Note: Saccular AAA of any size, recommend vascular   consultation         Final   CT ANGIOGRAPHY OF THE CHEST, ABDOMEN AND PELVIS:      1. Infrarenal aortic aneurysm measuring 5.5 x 4.9 cm, increased from 5.0 x   4.4 cm proximally 6 months ago. 2. Hyperdensity within the intramural thrombus within the aneurysmal segment   of the abdominal aorta possibly representing intramural hemorrhage. 3. No evidence of aortic dissection or rupture. 4. Prominent stenoses in the common iliac arteries bilaterally. CT OF THE CHEST (NON VASCULAR):      1. Right upper lobe masslike opacity, grossly stable as of 12/30/2021, likely   neoplastic.    2. Since 12/30/2021, there is mild decrease in the scattered ground-glass   opacities in the lungs, which likely represent pneumonia. 3. Right lower lobe persistent infiltrate, likely represent pneumonia. 4. Cardiomegaly with pleural effusions. CT OF THE  ABDOMEN AND the PELVIS (NON VASCULAR):      1. Atrophic right kidney, with diminished enhancement, likely due to severe   stenosis of the right renal artery. 2. Apparent mural thickening in the rectum, as indicated on the prior   unenhanced CT is may be due to adherent stool or neoplasm. Follow-up with   colonoscopy or CT recommended. 3. Mild compression fracture deformity in the L1 vertebral body. RECOMMENDATIONS:   Unavailable            CTA ABDOMEN PELVIS W CONTRAST   Final Result   Addendum 1 of 1   ADDENDUM:   With respect to the enlarging infrarenal aortic aneurysm, recommendations    the   American Journal Radiology enlargement greater > 0.5 cm in 6 months, as is   the case with this patient should be followed up with vascular surgery   consultation. Fusiform AAA Size: Follow-up Recommendation      2.6-2.9 cm Every 5 years      3.0-3.4 cm Every 3 years      3.5-3.9 cm Every 2 years      4.0-4.4 cm Every 12 months, Recommend vascular consultation      4.5-5.4 cm Every 6 months, Recommend vascular consultation >5.5 cm    Referral   to vascular specialist   ___________________________________________________________________________   ___   _______ Based on ACR White paper: J Am Laura Radiol 2013;10:789-794. 2 For   aortas of maximum diameter 2.6--2.9 cm meeting the criteria for AAA (>1.5    x   proximal normal segment; no f/u if 0.5 cm in 6 months or >1 cm in 1 year,   recommend vascular consultation      Note: Saccular AAA of any size, recommend vascular consultation      Note: AAA enlargement of >0.5 cm in 6 months or >1 cm in 1 year, recommend   vascular consultation Note: Saccular AAA of any size, recommend vascular   consultation         Final   CT ANGIOGRAPHY OF THE CHEST, ABDOMEN AND PELVIS:      1.  Infrarenal aortic aneurysm measuring 5.5 x 4.9 cm, increased from 5.0 x   4.4 cm proximally 6 months ago. 2. Hyperdensity within the intramural thrombus within the aneurysmal segment   of the abdominal aorta possibly representing intramural hemorrhage. 3. No evidence of aortic dissection or rupture. 4. Prominent stenoses in the common iliac arteries bilaterally. CT OF THE CHEST (NON VASCULAR):      1. Right upper lobe masslike opacity, grossly stable as of 12/30/2021, likely   neoplastic. 2. Since 12/30/2021, there is mild decrease in the scattered ground-glass   opacities in the lungs, which likely represent pneumonia. 3. Right lower lobe persistent infiltrate, likely represent pneumonia. 4. Cardiomegaly with pleural effusions. CT OF THE  ABDOMEN AND the PELVIS (NON VASCULAR):      1. Atrophic right kidney, with diminished enhancement, likely due to severe   stenosis of the right renal artery. 2. Apparent mural thickening in the rectum, as indicated on the prior   unenhanced CT is may be due to adherent stool or neoplasm. Follow-up with   colonoscopy or CT recommended. 3. Mild compression fracture deformity in the L1 vertebral body. RECOMMENDATIONS:   Unavailable            CT ABDOMEN PELVIS WO CONTRAST Additional Contrast? None   Final Result   1. Compared to 07/05/2021 (approximately 6 months ago), there is INTERVAL   ENLARGEMENT OF THE INFRARENAL AORTIC ANEURYSM FROM 5.0 x 4.4 cm to 5.5 x 4.9   cm. Possible intramural hematoma at level of the enlarging aneurysm. No   current evidence of rupture. Vascular surgery consultation recommended. 2. Questionable mural thickening in the rectum. Follow-up with colonoscopy   or follow-up CT recommended. 3. Mild increase in the perinephric edema. Clinical correlation for medical   renal disease recommended. 4. Right lower lobe infiltrate, likely pneumonia. 5. Cardiomegaly with small bilateral pleural effusions.    6. Mild compression fracture deformity of the L1 vertebral body, which has   developed in the interim since 07/15/2021. RECOMMENDATIONS:   Unavailable         CT HEAD WO CONTRAST   Final Result   1. No acute intracranial abnormality. 2. Chronic small vessel ischemic disease and generalized cerebral volume loss. 3. Pansinusitis. RECOMMENDATIONS:   Unavailable         XR CHEST PORTABLE   Final Result   Bilateral pulmonary infiltrates. Small left pleural effusion. EKG:  Rate 88  Atrial fibrillation  Nonspecific ST depressions noted in V3 V4  Abnormal EKG        ------------------------- NURSING NOTES AND VITALS REVIEWED ---------------------------  Date / Time Roomed:  1/7/2022  4:48 PM  ED Bed Assignment:  [id number]    The nursing notes within the ED encounter and vital signs as below have been reviewed. Patient Vitals for the past 24 hrs:   BP Temp Temp src Pulse Resp SpO2   01/11/22 1339 -- -- -- -- 20 --   01/11/22 1315 -- -- -- -- -- 98 %   01/11/22 1000 (!) 94/56 96.8 °F (36 °C) Axillary 78 20 (!) 86 %   01/11/22 0945 -- -- -- -- -- (!) 72 %   01/10/22 1945 118/79 97.7 °F (36.5 °C) Axillary 75 18 95 %       Oxygen Saturation Interpretation: Normal    ------------------------------------------ PROGRESS NOTES ------------------------------------------  Re-evaluation(s):  Time: 11:30 PM EST  Patients symptoms show no change  Repeat physical examination is improved    Counseling:  I have spoken with the patient and discussed todays results, in addition to providing specific details for the plan of care and counseling regarding the diagnosis and prognosis. Their questions are answered at this time and they are agreeable with the plan of admission.    --------------------------------- ADDITIONAL PROVIDER NOTES ---------------------------------  Consultations:  Time: 11:32 PM EST. Spoke with Dr. Claude Hernandez. Discussed case. They will admit the patient.   This patient's ED course included: a personal history and physicial examination, re-evaluation prior to disposition, multiple bedside re-evaluations, cardiac monitoring and continuous pulse oximetry    This patient has remained hemodynamically stable during their ED course. Diagnosis:  1. Pneumonia due to infectious organism, unspecified laterality, unspecified part of lung    2. Syncope and collapse    3. Acute on chronic respiratory failure with hypoxia (HCC)    4. Abdominal aortic aneurysm (AAA) without rupture ([facility] [address]    5. Acute urinary retention        Disposition:  Patient's disposition: Admit to telemetry  Patient's condition is fair.          Sade [address] 43., DO  Resident  01/07/22 Trever Melvin MD  01/11/22 8724

## 2022-01-08 NOTE — ED NOTES
Report faxed. [facility] at ext [phone] confirmed receipt.  When room clean patient can transport     Lexie Ag [employer]  01/08/22 4825

## 2022-01-08 NOTE — DISCHARGE INSTR - COC
Continuity of Care Form    Patient Name: Dahlia Agosto   :  1935  MRN:  [id number]    Admit date:  2022  Discharge date:  ***    Code Status Order: DNR-CC   Advance Directives:      Admitting Physician:  Marta Vazquez MD  PCP: Fernanda Jc MD    Discharging Nurse: [facility] Unit/Room#: 2262/4070-T  Discharging Unit Phone Number: [phone]    Emergency Contact:   Extended Emergency Contact Information  Primary Emergency Contact: Ashlyn Barlow  Address: [address] [place], [place] [address]nicoleJuni rahmanNYU Langone Orthopedic Hospitalcarolyn [address] [place] [address] Phone: [phone]  Relation: Spouse  Preferred language: English  Secondary Emergency Contact: Naima De La Cruz  Mobile Phone: [phone]  Relation: Niece/Nephew    Past Surgical History:  Past Surgical History:   Procedure Laterality Date    BRONCHOSCOPY N/A 2021    BRONCHOSCOPY W/EBUS FNA performed by Barbara Chavez MD at [address] [place] [address] N/A 2021    BRONCHOSCOPY ADD ON COMPUTER ASSISTED performed by Barbara Chavez MD at [address] [place] [address]  2021    BRONCHOSCOPY/TRANSBRONCHIAL NEEDLE BIOPSY performed by Barbara Chavez MD at [address] [place] [address]  2021    [address] performed by Barbara Chavez MD at [address] [place] [address] [address] TEST N/A 2021    Karis Crape stress test    COLONOSCOPY      years ago    CT NEEDLE BIOPSY LUNG PERCUTANEOUS  2021    CT NEEDLE BIOPSY LUNG PERCUTANEOUS 2021 Flavio Cuello MD [facility] CT    LUNG CANCER SURGERY  2005       Immunization History:   Immunization History   Administered Date(s) Administered    COVID-19, Pfizer, PF, 30mcg/0.3mL 2021, 2021       Active Problems:  Patient Active Problem List   Diagnosis Code    COPD (chronic obstructive pulmonary disease) ([facility] J44.9    Hyperlipidemia LDL goal <100 E78.5    Essential hypertension I10    Congestive heart failure (CHF) (HCC) I50.9    Lung mass R91.8    Atrial fibrillation ([facility] [facility] I48.91    Moderate protein-calorie malnutrition (HCC) E44.0    AAA (abdominal aortic aneurysm) (HCC) I71.4    Chronic systolic congestive heart failure (HCC) I50.22    Non-small cell cancer of right lung (HCC) C34.91    Malignant neoplasm of lung (HCC) C34.90    Compression fracture of first lumbar vertebra with routine healing S32.010D    Abnormal WBC count - Low blast count noted D72.9    Intractable back pain M54.9    Pneumonia J18.9    Syncope R55       Isolation/Infection:   Isolation            No Isolation          Patient Infection Status       Infection Onset Added Last Indicated Last Indicated By Review Planned Expiration Resolved Resolved By    None active    Resolved    COVID-19 (Rule Out) 01/07/22 01/07/22 01/07/22 COVID-19, Rapid (Ordered)   01/07/22 Rule-Out Test Resulted    COVID-19 (Rule Out) 01/04/22 01/04/22 01/04/22 COVID-19, Rapid (Ordered)   01/04/22 Rule-Out Test Resulted    COVID-19 (Rule Out) 12/29/21 12/29/21 12/29/21 COVID-19, Rapid (Ordered)   12/29/21 Rule-Out Test Resulted            Nurse Assessment:  Last Vital Signs: /63   Pulse 83   Temp 97 °F (36.1 °C) (Axillary)   Resp 20   Ht 5' 7\" (1.702 m)   Wt 148 lb (67.1 kg)   SpO2 94%   BMI 23.18 kg/m²     Last documented pain score (0-10 scale): Pain Level: 0  Last Weight:   Wt Readings from Last 1 Encounters:   01/08/22 148 lb (67.1 kg)     Mental Status:  {IP PT MENTAL STATUS:28657}    IV Access:  { JUWAN IV ACCESS:057040261}    Nursing Mobility/ADLs:  Walking   {[facility] DME GIDZ:976014872}  Transfer  {[facility] DME QWJF:987801313}  Bathing  {[facility] DME OZMS:909006901}  Dressing  {[facility] DME BXWL:472679508}  Toileting  {[facility] DME VRWH:976431781}  Feeding  {[facility] DME VBVL:575316961}  Med Admin  {[facility] DME CCUK:989540258}  Med Delivery   { JUWAN MED Delivery:765091839}    Wound Care Documentation and Therapy:  Wound 01/08/22 Coccyx Left (Active)   Dressing/Treatment Open to air 01/08/22 0958   Wound Length (cm) 0.7 cm 01/08/22 0958   Wound Width (cm) 1.7 cm 01/08/22 0958   Wound Depth (cm) 0.1 cm 01/08/22 0958   Wound Surface Area (cm^2) 1.19 cm^2 01/08/22 0958   Wound Volume (cm^3) 0.119 cm^3 01/08/22 0958   Wound Assessment Winkelman/red;Superficial 01/08/22 0958   Drainage Amount None 01/08/22 0958   Arina-wound Assessment Non-blanchable erythema 01/08/22 0958   Number of days: 0       Wound 01/08/22 Coccyx (Active)   Dressing/Treatment Open to air 01/08/22 0958   Wound Length (cm) 5 cm 01/08/22 0958   Wound Width (cm) 5 cm 01/08/22 0958   Wound Surface Area (cm^2) 25 cm^2 01/08/22 0958   Wound Assessment Dry;Non-blanchable erythema 01/08/22 0958   Drainage Amount None 01/08/22 0958   Number of days: 0        Elimination:  Continence: Bowel: {YES / XL:91635}  Bladder: {YES / KP:54877}  Urinary Catheter: {Urinary Catheter:839010168}   Colostomy/Ileostomy/Ileal Conduit: {YES / MB:62840}       Date of Last BM: ***    Intake/Output Summary (Last 24 hours) at 1/8/2022 1426  Last data filed at 1/8/2022 0334  Gross per 24 hour   Intake --   Output 2150 ml   Net -2150 ml     I/O last 3 completed shifts:  In: -   Out: 2150 [Urine:2150]    Safety Concerns:     History of Falls (last 30 days) and At Risk for Falls    Impairments/Disabilities:      None    Nutrition Therapy:  Current Nutrition Therapy:   - Oral Diet:  Bite sized. Honey thick. Routes of Feeding: Oral  Liquids: {Slp liquid thickness:27939}  Daily Fluid Restriction: no  Last Modified Barium Swallow with Video (Video Swallowing Test): not done    Treatments at the Time of Hospital Discharge:   Respiratory Treatments: ***  Oxygen Therapy:  is on oxygen at 11 L/min per nasal cannula.   Ventilator:    None    Rehab Therapies: {THERAPEUTIC INTERVENTION:1763795037}  Weight Bearing Status/Restrictions: No weight bearing restirctions  Other Medical Equipment (for information only, NOT a DME order):  {EQUIPMENT:504919523}  Other Treatments: ***    Patient's personal belongings (please select all that are sent with patient):  None    RN SIGNATURE:  Electronically signed by Jodi Caballero RN on 1/11/22 at 3:51 PM EST    CASE MANAGEMENT/SOCIAL WORK SECTION    Inpatient Status Date: ***    Readmission Risk Assessment Score:  Readmission Risk              Risk of Unplanned Readmission:  20           Discharging to Facility/ Agency   Name: [facility]  Address:  [place] [address]  Phone:  [phone]  Fax:  [phone]    / signature: Electronically signed by Larry Watters on 1/8/22 at 2:26 PM EST    PHYSICIAN SECTION    Prognosis: Poor    Condition at Discharge: Stable    Rehab Potential (if transferring to Rehab): Poor    Recommended Labs or Other Treatments After Discharge:     Physician Certification: I certify the above information and transfer of Jenise Saavedra  is necessary for the continuing treatment of the diagnosis listed and that he requires [place] for greater than 30 days.      Update Admission H&P: No change in H&P    PHYSICIAN SIGNATURE:  Electronically signed by GRACIELA Meredith CNP on 1/11/22 at 1:43 PM EST

## 2022-01-08 NOTE — PROGRESS NOTES
Name: Kyle Sanches  : 1935  MRN: [id number]    Date: 2022    Benefits of immediately proceeding with Radiology exam outweigh the risks and therefore the following is being waived:      [] Pregnancy test    [] Protocol for Iodine allergy    [] MRI questionnaire    [x] Rolando Soriano MD

## 2022-01-08 NOTE — PROGRESS NOTES
Consult deferred as pt is hospice    5.5 cm Assx AAA  · Continue medical management B Blocker when bp better  · Per ER staff patient had some abdominal discomfort but it relieved with garland placements - decompressing his bladder  · They did say he was a bit confused   · No indication for surgical intervention at this time - no signs of rupture, though considering size he does have an indication for repair in the future   · Pt is DNR CC and has terminal illness of lung ca and is now hospice  · Per notes wife would never want to go through a surgical procedure to address this issue  · Please call if needed    Juana Heredia MD

## 2022-01-08 NOTE — CARE COORDINATION
Social Work discharge planning   Pt will need Covid test on day of discharge if goes back to [facility]. Noted HOV following. N17 in [address] and ambulance forms are in folder.  Social Work to follow for support and discharge planning with CM   Electronically signed by Amari Crane on 1/8/2022 at 2:56 PM

## 2022-01-08 NOTE — PROGRESS NOTES
Liaison Informational Visit Note      Referral received from [facility]               Patient Name: Juan C Burns   :  1935  MRN:  [id number]    Admit date:  2022      Hospital Admitting Physician:  Nereida Aguilar MD   PCP:  Kiera Leger MD    Primary Insurance: Payor: Esauvalencia Pandajaspreet /  /  /      Emergency Contact:      Contact/Relation:   /         Phone:         Advance Directive  Advance directives received No  Patient does not have a documented healthcare surrogate  Discussed with: Family member  DPOA-HC Name-Relation:    Phone:       Terminal Diagnosis Lung Ca      Current Hospital Problem List:   Patient Active Problem List   Diagnosis Code    COPD (chronic obstructive pulmonary disease) ([facility] [address] J44.9    Hyperlipidemia LDL goal <100 E78.5    Essential hypertension I10    Congestive heart failure (CHF) ([facility] [address] I50.9    Lung mass R91.8    Atrial fibrillation ([facility] [address] I48.91    Moderate protein-calorie malnutrition ([facility] [facility] E44.0    AAA (abdominal aortic aneurysm) ([facility] [address] I71.4    Chronic systolic congestive heart failure (HCC) I50.22    Non-small cell cancer of right lung ([facility] [address] C34.91    Malignant neoplasm of lung ([facility] [address] [facility] C34.90    Compression fracture of first lumbar vertebra with routine healing S32.010D    Abnormal WBC count - Low blast count noted D72.9    Intractable back pain M54.9    Pneumonia J18.9    Syncope R55       Code Status Order: DNR-CC    Allergies:  Patient has no known allergies. Family Goal: family wants patient to be treated for pneumonia    Meeting held with wife and two nephews    Referral received. Chart reviewed. Met with family outside of room. Received a brief past medical history of patient. Explained hospice philosophy and no longer pursuing any further aggressive treatment. Focusing on comfort care only. Explained hospice benefit and reviewed all levels of care including the hospice house for short term symptom management.  Once symptoms are managed, patient would transfer to a routine level of care either home or ECF with hospice. Explained room and board would be private pay at a facility. Discussed Medicaid. Discussed roles and frequency visits of skilled nursing, personal care team and SW. Wife expressed that she can not care for patient at home. She does not have support 24 hours a day. Wife expressed that she does not feel she can afford to private pay at a facility if patient goes there under hospice care. Discussed Medicaid. Wife is interested to see if patient would qualify for Medicaid. Patient's nephew stated they would like patient to be treated for pneumonia and see if patient can return back to [facility] under rehab. Family is agreeable for hospice to follow while patient is in the hospital. Answered all questions. Emotional support and active listening provided. Brochure given. Updated bedside RN and Arnaldo Colin. HOTV plan:  1. Family would like patient to receive treatment for pneumonia. Patient is unable to return home with wife. No caregivers in the home. Wife does not feel she can afford to private pay for patient to go to a facility under hospice care. She is interested in applying for Medicaid. 2. Hospice is not managing patient's care at this time. 3. Please call [facility] with any questions at [phone]      Discharge Plan:  Discharge Disposition;  Unknown at this time      Electronically signed by Jennifer Conde RN on 1/8/2022 at 11:44 AM

## 2022-01-08 NOTE — ED NOTES
I received this patient at sign out from Dr. Daniel Porter. I have discussed the patient's initial exam, treatment and plan of care with the out going physician. I have introduced my self to the patient / family and have answered their questions to this point. I have examined the patient myself and reviewed ordered tests / medications and  reviewed any available results to this point. If a resident is involved in the Emergency Department care, I have discussed my findings and plan with them as well. Dr. Daniel Porter spoke with vascular surgery initially prior to this CTAs resulting. 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 Will call family to discuss CODE STATUS at this time. The wife states he would never want to go through an aggressive surgery or anything invasive performed. Their goals are comfort at this time. CODE STATUS is changed to DNR CC. Patient will be admitted with hospice and palliative care consults. Wife states this would be best for him and his wishes at this point. 2241: Consulted with hospitalist group, Lorena Jaramillo NP, who accepted for admission.   Patient was admitted to [place] [address] as he is a DNR [address],   01/07/22 2240

## 2022-01-08 NOTE — H&P
History & Physical      PCP: Eddie La MD    Date of Admission: 1/7/2022    Date of Service: Pt seen/examined on 1/8/2022 and is      admitted to Inpatient with expected LOS greater than two midnights due to medical therapy. Chief Complaint:  had concerns including Loss of Consciousness (passe dout in bathroom at [facility] today, hasnt been eatign or drinking for a few days, lethargic ). History Of Present Illness:    Mr. Flor Beatty, a 81y.o. year old male  who  has a past medical history of A-fib ([address] AAA (abdominal aortic aneurysm) ([address] Cancer ([address] CHF (congestive heart failure) ([address] COPD (chronic obstructive pulmonary disease) ([address] Heart failure ([address] Hyperlipidemia, and Hypertension. 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 He was discharged as a DNR CC to the facility for rehab and had what appears to be syncopal episode with hypoxemia. He was brought in.   Worked up for an abdominal aneurysm which is not operative obviously and patient's family is now considering discharge with rehab since they do not have coverage for actual onsite hospice care though they are willing to provide him comfort level of care        Past Medical History:        Diagnosis Date    A-fib [facility])     AAA (abdominal aortic aneurysm) ([address]     measures 5.0 cm    Cancer ([address] 2005    left lung    CHF (congestive heart failure) ([address]     COPD (chronic obstructive pulmonary disease) ([address]     Heart failure ([address]     Hyperlipidemia     Hypertension        Past Surgical History:        Procedure Laterality Date    BRONCHOSCOPY N/A 9/1/2021    BRONCHOSCOPY W/EBUS FNA performed by Sera Colvin MD at [address] [place] [address] N/A 9/1/2021    BRONCHOSCOPY ADD ON COMPUTER ASSISTED performed by Sera Colvin MD at [address] [place] [address]  9/1/2021 BRONCHOSCOPY/TRANSBRONCHIAL NEEDLE BIOPSY performed by Javier Mckeon MD at [address]  9/1/2021    BRONCHOSCOPY ALVEOLAR LAVAGE performed by Javier Mckeon MD at [address] TEST N/A 06/16/2021    Lexiscan stress test    COLONOSCOPY      years ago    CT NEEDLE BIOPSY LUNG PERCUTANEOUS  7/1/2021    CT NEEDLE BIOPSY LUNG PERCUTANEOUS 7/1/2021 Celena Weaver MD [facility] CT    LUNG CANCER SURGERY  01/01/2005       Medications Prior to Admission:      Prior to Admission medications    Medication Sig Start Date End Date Taking?  Authorizing Provider   ipratropium-albuterol (DUONEB) 0.5-2.5 (3) MG/3ML SOLN nebulizer solution Inhale 3 mLs into the lungs every 4 hours (while awake) 1/4/22   Anisha Novoa MD   apixaban Rene Canes) 2.5 MG TABS tablet Take 1 tablet by mouth 2 times daily 1/4/22   Anisha Novoa MD   lactulose (CEPHULAC) 10 g packet Take 10 g by mouth 2 times daily    Historical Provider, MD   docusate sodium (COLACE) 100 MG capsule Take 1 capsule by mouth 2 times daily as needed for Constipation 11/20/21   GRACIELA Ivan CNP   zinc oxide (DESITIN MAXIMUM STRENGTH) 40 % PSTE paste Apply a pea size amount to the anal area as needed for irritation for up to 10 days 11/20/21   GRACIELA Ivan CNP   ondansetron (ZOFRAN-ODT) 4 MG disintegrating tablet Take 1 tablet by mouth every 8 hours as needed for Nausea or Vomiting 11/9/21   Josephine Goncalves MD   isosorbide mononitrate (IMDUR) 30 MG extended release tablet Take 2 tablets by mouth daily 10/22/21   Ashish Bell MD   PROCTO-MED HC 2.5 % CREA rectal cream APPLY TO AFFECTED AREA TWICE A DAY MAXIMUM OF 2 WEEKS 7/6/21   Historical Provider, MD   dilTIAZem (CARDIZEM CD) 120 MG extended release capsule Take 1 capsule by mouth daily 6/28/21   Diamante Pool MD   bumetanide (BUMEX) 1 MG tablet Take 1 tablet by mouth daily 6/25/21   Geni Anderson MD   metoprolol succinate (TOPROL XL) 100 MG extended release tablet Take 1 tablet by mouth 2 times daily 6/18/21   Alice Cunningham MD   Cholecalciferol (VITAMIN D3) 2000 UNITS CAPS Take 2,000 Units by mouth daily     Historical Provider, MD       Allergies:  Patient has no known allergies. Social History:    TOBACCO:   reports that he quit smoking about 20 years ago. His smoking use included cigarettes. He has a 10.00 pack-year smoking history. He has never used smokeless tobacco.  ETOH:   reports previous alcohol use. Family History:    Reviewed in detail and negative for DM, CAD, Cancer, CVA. Positive as follows\"      Problem Relation Age of Onset    Cancer Sister         melanoma    Breast Cancer Sister     Heart Disease Brother        REVIEW OF SYSTEMS:   Pertinent positives as noted in the HPI. All other systems reviewed and negative. Positive for syncope  Positive for failure to thrive  Negative for fevers cough or chill    PHYSICAL EXAM:  /63   Pulse 83   Temp 97 °F (36.1 °C) (Axillary)   Resp 20   Ht 5' 7\" (1.702 m)   Wt 148 lb (67.1 kg)   SpO2 94%   BMI 23.18 kg/m²   General appearance: No apparent distress, appears stated age and cooperative. HEENT: Normal cephalic, atraumatic without obvious deformity. Pupils equal, round, and reactive to light. Extra ocular muscles intact. Conjunctivae/corneas clear. Oral mucosa does appear to be dry  Neck:  Trachea midline. Respiratory: Distant  Cardiovascular: Regular heart rate rhythm  Abdomen: Nontender  Musculoskeletal: Trace edema skin: Normal skin color. No rashes or lesions. Neurologic:  Neurovascularly intact without any focal sensory/motor deficits.   He wakes up he responds a few then falls asleep  Psychiatric: Alert and oriented, thought content appropriate, normal insight    Reviewed EKG and CXR personally      CBC:   Recent Labs     01/07/22  1738 01/08/22  0311   WBC 14.0* 16.5*   RBC 4.57 4.19   HGB 14.7 13.3   HCT 46.7 42.4   .2* 101.2*   RDW 20.2* 20.1*    177     BMP:   Recent Labs     01/07/22  1738 01/08/22  0311    145   K 4.3 3.4*   CL 95* 99   CO2 28 29   BUN 62* 63*   CREATININE 1.9* 1.8*   MG  --  2.3     LFT:  Recent Labs     01/07/22  1738 01/07/22 2022   PROT 5.5*  --    ALKPHOS 120  --    *  --    *  --    BILITOT 3.0*  --    LIPASE  --  11*     CE:  No results for input(s): Arie Wyanet in the last 72 hours. PT/INR: No results for input(s): INR, APTT in the last 72 hours. BNP: No results for input(s): BNP in the last 72 hours. ESR: No results found for: SEDRATE  CRP: No results found for: CRP  D Dimer:   Lab Results   Component Value Date    DDIMER 1218 01/07/2022      Folate and B12: No results found for: XLLETMNB28, No results found for: FOLATE  Lactic Acid: No results found for: LACTA  Thyroid Studies:   Lab Results   Component Value Date    TSH 3.130 06/16/2021       Oupatient labs:  Lab Results   Component Value Date    CHOL 95 06/25/2021    TRIG 68 06/25/2021    HDL 38 06/25/2021    LDLCALC 43 06/25/2021    TSH 3.130 06/16/2021    INR 2.2 12/30/2021       Urinalysis:    Lab Results   Component Value Date    NITRU Negative 01/07/2022    WBCUA NONE 01/07/2022    BACTERIA FEW 01/07/2022    RBCUA 5-10 01/07/2022    BLOODU MODERATE 01/07/2022    SPECGRAV 1.015 01/07/2022    GLUCOSEU Negative 01/07/2022       Imaging:  CT ABDOMEN PELVIS WO CONTRAST Additional Contrast? None    Result Date: 1/7/2022  EXAMINATION: CT OF THE ABDOMEN AND PELVIS WITHOUT CONTRAST 1/7/2022 7:39 pm TECHNIQUE: CT of the abdomen and pelvis was performed without the administration of intravenous contrast. Multiplanar reformatted images are provided for review. Dose modulation, iterative reconstruction, and/or weight based adjustment of the mA/kV was utilized to reduce the radiation dose to as low as reasonably achievable.  COMPARISON: CT of the abdomen and pelvis, 07/15/2021 HISTORY: ORDERING SYSTEM PROVIDED HISTORY: elevated liver enzymes, urinary retention TECHNOLOGIST PROVIDED HISTORY: Reason for exam:->elevated liver enzymes, urinary retention Additional Contrast?->None FINDINGS: Lower Chest: Pulmonary infiltrate in the lower lobe likely represents pneumonia. The heart is enlarged. Small bilateral pleural effusions. Elevated left hemidiaphragm. Organs: Liver: Unremarkable. Gallbladder: Unremarkable. Pancreas: Unremarkable. Spleen:  Unremarkable. Adrenals: Stable small low-density right adrenal mass consistent with adenoma. Kidneys: The right kidney is atrophic. No stone or hydronephrosis. Moderate perinephric edema, mildly increased as of 07/15/2021. GI/Bowel: Severe distal colonic diverticulosis without diverticulitis. Questionable mural thickening versus peripheral liquid stool in the distal colon. Normal appendix. Pelvis: The urinary bladder is decompressed by a  Alejandra catheter. It is therefore not well evaluated. Within this limitation, no gross abnormality is detected. The prostate gland is grossly normal in size. Peritoneum/Retroperitoneum: Aneurysmal dilation of the infrarenal aorta to 5.5 x 4.9 cm. On 07/15/2021, approximately 6 months ago, it measured 5.0 x 4.4 cm. Hyperdensity along margins of the aneurysm may indicate intramural hematoma. Severe calcified atherosclerosis seen in the pelvic arteries, especially the common iliac arteries. No aneurysm in the pelvic arteries. No lymphadenopathy. No free air. Bones/Soft Tissues: Mild age indeterminate compression fracture deformity of the L1 vertebral body which has developed in the interim since 07/15/2021. No lytic or blastic lesion is seen. 1. Compared to 07/05/2021 (approximately 6 months ago), there is INTERVAL ENLARGEMENT OF THE INFRARENAL AORTIC ANEURYSM FROM 5.0 x 4.4 cm to 5.5 x 4.9 cm. Possible intramural hematoma at level of the enlarging aneurysm. No current evidence of rupture. Vascular surgery consultation recommended. 2. Questionable mural thickening in the rectum. Follow-up with colonoscopy or follow-up CT recommended. 3. Mild increase in the perinephric edema. Clinical correlation for medical renal disease recommended. 4. Right lower lobe infiltrate, likely pneumonia. 5. Cardiomegaly with small bilateral pleural effusions. 6. Mild compression fracture deformity of the L1 vertebral body, which has developed in the interim since 07/15/2021. RECOMMENDATIONS: Unavailable     XR CHEST (2 VW)    Result Date: 12/29/2021  EXAMINATION: TWO XRAY VIEWS OF THE CHEST 12/29/2021 3:55 pm COMPARISON: Chest x-ray 11/20/2021, PET-CT 09/20/2021 and CT chest 08/24/2021 HISTORY: ORDERING SYSTEM PROVIDED HISTORY: Shortness of breath TECHNOLOGIST PROVIDED HISTORY: Reason for exam:->Shortness of breath What reading provider will be dictating this exam?->CRC FINDINGS: Known malignancy and with a persistent right suprahilar ill-defined lesion. Ill-defined parenchymal or pleural based densities at the mid and lower right lung and which are nonspecific. Left post thoracotomy change with ipsilateral volume loss. A partially obscured 3 cm nodule at the left lung base may be present. The heart appears unchanged in size. No pneumothorax. Left post thoracotomy change with ipsilateral volume loss. Right suprahilar known mass, similar to prior. Separately, both lungs show worsening opacities which may reflect worsening metastatic lung disease. XR LUMBAR SPINE (2-3 VIEWS)    Result Date: 12/31/2021  EXAMINATION: THREE XRAY VIEWS OF THE LUMBAR SPINE 12/31/2021 1:32 pm COMPARISON: None. HISTORY: ORDERING SYSTEM PROVIDED HISTORY: follow up for L1 compression fracture. TECHNOLOGIST PROVIDED HISTORY: Upright while wearing brace. Reason for exam:->follow up for L1 compression fracture. What reading provider will be dictating this exam?->CRC FINDINGS: Redemonstration of mild vertebral body height loss at L1 estimated to be approximately 25% osteoporotic compression fracture as seen on recent CT.   No significant change in alignment. Otherwise preserved vertebral body heights. No significant listhesis. Marginal osteophytes with facet arthrosis. Atherosclerosis of the abdominal aorta which is aneurysmal measuring up to 3 cm, however this measured up to 5.4 cm previously. Recommend follow-up every 6 months per the American college of radiology white paper guidelines. See previous report from CT done on 12/29/2021. Redemonstration of an acute or subacute compression fracture of L1 with estimated 25% vertebral body height loss. No significant change in alignment. Multilevel spondylosis. Atherosclerosis of an abdominal aortic aneurysm. See recent CT from 12/29/2021 for more accurate assessment of the abdominal aorta and recommendations. CT HEAD WO CONTRAST    Result Date: 1/7/2022  EXAMINATION: CT OF THE HEAD WITHOUT CONTRAST  1/7/2022 5:01 pm TECHNIQUE: CT of the head was performed without the administration of intravenous contrast. Dose modulation, iterative reconstruction, and/or weight based adjustment of the mA/kV was utilized to reduce the radiation dose to as low as reasonably achievable. COMPARISON: None. HISTORY: ORDERING SYSTEM PROVIDED HISTORY: [facility] cancer TECHNOLOGIST PROVIDED HISTORY: Has a \"code stroke\" or \"stroke alert\" been called? ->No Reason for exam:->[facility] cancer Decision Support Exception - unselect if not a suspected or confirmed emergency medical condition->Emergency Medical Condition (MA) FINDINGS: There is patchy hypoattenuation within the white matter of the bilateral cerebral hemispheres. There is no evidence of mass, mass effect, or midline shift. There is enlargement of the ventricles and sulci suggesting generalized cerebral volume loss. No extra-axial fluid collections or acute hemorrhage. The gray-white differentiation appears preserved without evidence of acute cortical ischemia. The calvarium is intact.   There is mucosal thickening and fluid throughout the paranasal sinuses. There are bilateral lens replacements. There are trace bilateral mastoid effusions. 1. No acute intracranial abnormality. 2. Chronic small vessel ischemic disease and generalized cerebral volume loss. 3. Pansinusitis. RECOMMENDATIONS: Unavailable     CT CHEST WO CONTRAST    Result Date: 12/30/2021  EXAMINATION: CT OF THE CHEST WITHOUT CONTRAST 12/30/2021 7:18 am TECHNIQUE: CT of the chest was performed without the administration of intravenous contrast. Multiplanar reformatted images are provided for review. Dose modulation, iterative reconstruction, and/or weight based adjustment of the mA/kV was utilized to reduce the radiation dose to as low as reasonably achievable. COMPARISON: June 16, 2021 HISTORY: ORDERING SYSTEM PROVIDED HISTORY: worsening metastatic lung disease? TECHNOLOGIST PROVIDED HISTORY: Reason for exam:->worsening metastatic lung disease? What reading provider will be dictating this exam?->CRC FINDINGS: Mediastinum: The heart is enlarged. No significant change in appearance of the aorta with atherosclerosis. No pericardial effusion. Few subcentimeter mediastinal lymph nodes are unchanged. Lungs/pleura: New multifocal sub solid consolidations in the upper lobes and right lower lobe. Findings favor multifocal pneumonia, although new foci of metastatic disease not excluded. The largest sub solid area measures 2.8 x 2.8 cm in the right upper lobe. Small right pleural effusion with mild bibasilar atelectasis. No pneumothorax. Moderate emphysema. Upper Abdomen: Stable low attenuating right adrenal adenoma. Stable perinephric stranding in the upper abdomen. Soft Tissues/Bones: Soft tissue thickening and edema with a subcutaneous fluid collection over the left flank, similar to prior study. Correlate for any history of trauma. No new subtle compression fracture of L1, new since June 16 and otherwise age indeterminate.   Diminished bony mineralization with spondylosis of the spine.     1. New multifocal sub solid consolidations bilaterally. Findings favor multifocal pneumonia, although recurrent metastatic disease is not excluded. Recommend attention on follow-up exam. 2. Background of moderate emphysema. 3. Osteoporotic compression fracture of L1 with estimated 25% vertebral body height loss is new since June 2021 and otherwise age indeterminate. No significant bony retropulsion. RECOMMENDATIONS: Unavailable     CT LUMBAR SPINE WO CONTRAST    Result Date: 12/29/2021  EXAMINATION: CT OF THE LUMBAR SPINE WITHOUT CONTRAST  12/29/2021 TECHNIQUE: CT of the lumbar spine was performed without the administration of intravenous contrast. Multiplanar reformatted images are provided for review. Adjustment of mA and/or kV according to patient size was utilized. Dose modulation, iterative reconstruction, and/or weight based adjustment of the mA/kV was utilized to reduce the radiation dose to as low as reasonably achievable. COMPARISON: 07/15/2021 HISTORY: ORDERING SYSTEM PROVIDED HISTORY: back pain, no trauma TECHNOLOGIST PROVIDED HISTORY: Reason for exam:->back pain, no trauma Decision Support Exception - unselect if not a suspected or confirmed emergency medical condition->Emergency Medical Condition (MA) What reading provider will be dictating this exam?->CRC FINDINGS: BONES/ALIGNMENT: The study reveals a 3 mm anterolisthesis of L2 on L3. There is associated disc height loss and vacuum phenomena compatible with degenerative disc disease. There is central disc bulging at this region. Due to the associated hypertrophy of the of the ligamentum flavum there is moderate spinal stenosis. Correlation with CT with myelography or MRI through this region may be helpful to determine the extent of narrowing. There is a new compression deformity involving the superior endplate of L1.  This appears acute and correlation with fixed back pain with movement may be helpful to determine the significance of this finding. There is disc height loss at the L4-L5 and L5-S1 level with that appears stable. No osseous destructive lesion is seen. Trey Hoyles SOFT TISSUES/RETROPERITONEUM: No paraspinal mass is seen. The abdominal aorta reveals fusiform aneurysmal change. There is increased dilation of the distal abdominal aorta measuring 5.4 cm in transverse diameter as compared with 5.0  mm on the study of 07/15/2021     1. New acute appearing compression fracture of the L1 vertebral body 2. Degenerative spondylolisthesis at L2-L3 causing moderate spinal stenosis 3. Degenerative disc disease at L4-L5, stable 4. Increased size of the abdominal aorta aneurysm, measuring RECOMMENDATIONS: Fusiform AAA Size:    Follow-up Recommendation1: 2.6-2.9 cm     Every 5 years 2 3.0-3.4 cm       Every 3 years 3.5-3.9 cm      Every 2 years 4.0-4.4 cm     Every 12 months,  recommend vascular consultation 4.5-5.4 cm     Every 6 months,  recommend vascular consultation >5.5 cm        Referral to vascular specialist ______________________________________________________________________________ _______ 1 Based on ACR White paper: IJ am Laura Radioli [id number].  2 For aortas of maximum diameter 2.6--2.9 cm meeting the criteria for AAA (>1.5 x proximal normal segment; no f/u if < 1.5 x proximal normal segment; no f/u for aortas < 2.6 cm) Note:  AAA enlargement of >0.5 cm in 6 months or >1 cm in 1 year, recommend vascular consultation Note: Saccular AAA of any size, recommend vascular consultation     XR CHEST PORTABLE    Result Date: 1/7/2022  EXAMINATION: ONE XRAY VIEW OF THE CHEST 1/7/2022 4:45 pm COMPARISON: 12/29/2021 HISTORY: ORDERING SYSTEM PROVIDED HISTORY: LOC TECHNOLOGIST PROVIDED HISTORY: Reason for exam:->LOC FINDINGS: Bilateral pulmonary infiltrates. Left pleural effusion. No pneumothorax. Cardiomegaly. The osseous structures are without acute process. Bilateral pulmonary infiltrates. Small left pleural effusion.      CTA CHEST W CONTRAST    Result Date: 1/7/2022  EXAMINATION: CTA OF THE CHEST; CTA OF THE ABDOMEN AND PELVIS WITH CONTRAST 1/7/2022 8:25 pm TECHNIQUE: CTA of the chest was performed after the administration of intravenous contrast.  Multiplanar reformatted images are provided for review. MIP images are provided for review. Dose modulation, iterative reconstruction, and/or weight based adjustment of the mA/kV was utilized to reduce the radiation dose to as low as reasonably achievable.; CTA of the abdomen and pelvis was performed with the administration of intravenous contrast. Multiplanar reformatted images are provided for review. MIP images are provided for review. Dose modulation, iterative reconstruction, and/or weight based adjustment of the mA/kV was utilized to reduce the radiation dose to as low as reasonably achievable. COMPARISON: None. HISTORY: ORDERING SYSTEM PROVIDED HISTORY: AAA TECHNOLOGIST PROVIDED HISTORY: Reason for exam:->AAA FINDINGS: CT ANGIOGRAPHY OF THE CHEST, ABDOMEN AND PELVIS: *Moderate calcified atherosclerosis in the thoracic aorta which is tortuous but not aneurysmal.  No dissection or rupture. *Severe calcified atherosclerosis in the abdominal aorta. *Aneurysmal dilation of the infrarenal aorta to approximately 5.5 x 4.9 cm. Approximately 6 months ago it measured 5.0 x 4.4 cm. *Hyperdensity within intramural thrombus raises the possibility of intramural hematoma. *No evidence of aortic rupture or dissection. *Severe stenosis at the origin of the celiac trunk *Mild stenosis at the origin of the SMA. Prominent stenoses at the origins of both renal arteries. *Prominent calcified atherosclerosis in the common iliac arteries resulting in moderate to severe grade stenoses in the proximal right common iliac artery as well as the distal left common iliac artery. CT OF THE CHEST, ABDOMEN AND PELVIS (NON ANGIOGRAPHIC PORTION OF THE STUDY): MEDIASTINUM: The heart is enlarged. No pericardial effusion.   The main pulmonary artery is top-normal in caliber at 2.9 cm. LUNGS: Masslike nodular opacity in the right upper lobe measures approximately 2.8 x 2.1 cm (axial sequence image 197). The nodular infiltrate previously seen in the anterior aspect of the right lower lobe is currently smaller (axial sequence image 235). Subtle ground-glass opacities previously seen in the left upper lobe have mostly resolved. The central airway is clear. No pneumothorax. Small bilateral pleural effusions. BONES/SOFT TISSUES: The visualized bones are intact without fracture or focal lesion. ORGANS: Liver: Unremarkable. Gallbladder: Unremarkable. Pancreas: Moderately atrophic. Otherwise, unremarkable. Spleen:  Unremarkable. Adrenals: Unremarkable. Kidneys: The right kidney is atrophic and relatively hypoenhancing. Findings may be the result of severe stenosis at the origin of the right renal artery. GI/BOWEL: No bowel wall thickening or obstruction is seen. Prominent distal colonic diverticulosis without diverticulitis. The apparent thickening of the rectum as suggested on the unenhanced CT of the abdomen and pelvis may be due to adherent stool or neoplasm. PERITONEUM/EXTRA PERITONEUM: No lymphadenopathy. No free air or free fluid is seen. PELVIS: The urinary bladder is decompressed by a  Alejandra catheter. It is therefore not well evaluated. Within this limitation, no gross abnormality is detected. The prostate gland is normal in size. BONES/SOFT TISSUES: Mild compression fracture deformity of the L1 vertebral body. CT ANGIOGRAPHY OF THE CHEST, ABDOMEN AND PELVIS: 1. Infrarenal aortic aneurysm measuring 5.5 x 4.9 cm, increased from 5.0 x 4.4 cm proximally 6 months ago. 2. Hyperdensity within the intramural thrombus within the aneurysmal segment of the abdominal aorta possibly representing intramural hemorrhage. 3. No evidence of aortic dissection or rupture. 4. Prominent stenoses in the common iliac arteries bilaterally.  CT OF THE CHEST (NON VASCULAR): 1. Right upper lobe masslike opacity, grossly stable as of 12/30/2021, likely neoplastic. 2. Since 12/30/2021, there is mild decrease in the scattered ground-glass opacities in the lungs, which likely represent pneumonia. 3. Right lower lobe persistent infiltrate, likely represent pneumonia. 4. Cardiomegaly with pleural effusions. CT OF THE  ABDOMEN AND the PELVIS (NON VASCULAR): 1. Atrophic right kidney, with diminished enhancement, likely due to severe stenosis of the right renal artery. 2. Apparent mural thickening in the rectum, as indicated on the prior unenhanced CT is may be due to adherent stool or neoplasm. Follow-up with colonoscopy or CT recommended. 3. Mild compression fracture deformity in the L1 vertebral body. RECOMMENDATIONS: Unavailable     CTA ABDOMEN PELVIS W CONTRAST    Result Date: 1/7/2022  EXAMINATION: CTA OF THE CHEST; CTA OF THE ABDOMEN AND PELVIS WITH CONTRAST 1/7/2022 8:25 pm TECHNIQUE: CTA of the chest was performed after the administration of intravenous contrast.  Multiplanar reformatted images are provided for review. MIP images are provided for review. Dose modulation, iterative reconstruction, and/or weight based adjustment of the mA/kV was utilized to reduce the radiation dose to as low as reasonably achievable.; CTA of the abdomen and pelvis was performed with the administration of intravenous contrast. Multiplanar reformatted images are provided for review. MIP images are provided for review. Dose modulation, iterative reconstruction, and/or weight based adjustment of the mA/kV was utilized to reduce the radiation dose to as low as reasonably achievable. COMPARISON: None. HISTORY: ORDERING SYSTEM PROVIDED HISTORY: AAA TECHNOLOGIST PROVIDED HISTORY: Reason for exam:->AAA FINDINGS: CT ANGIOGRAPHY OF THE CHEST, ABDOMEN AND PELVIS: *Moderate calcified atherosclerosis in the thoracic aorta which is tortuous but not aneurysmal.  No dissection or rupture. adherent stool or neoplasm. PERITONEUM/EXTRA PERITONEUM: No lymphadenopathy. No free air or free fluid is seen. PELVIS: The urinary bladder is decompressed by a  Alejandra catheter. It is therefore not well evaluated. Within this limitation, no gross abnormality is detected. The prostate gland is normal in size. BONES/SOFT TISSUES: Mild compression fracture deformity of the L1 vertebral body. CT ANGIOGRAPHY OF THE CHEST, ABDOMEN AND PELVIS: 1. Infrarenal aortic aneurysm measuring 5.5 x 4.9 cm, increased from 5.0 x 4.4 cm proximally 6 months ago. 2. Hyperdensity within the intramural thrombus within the aneurysmal segment of the abdominal aorta possibly representing intramural hemorrhage. 3. No evidence of aortic dissection or rupture. 4. Prominent stenoses in the common iliac arteries bilaterally. CT OF THE CHEST (NON VASCULAR): 1. Right upper lobe masslike opacity, grossly stable as of 12/30/2021, likely neoplastic. 2. Since 12/30/2021, there is mild decrease in the scattered ground-glass opacities in the lungs, which likely represent pneumonia. 3. Right lower lobe persistent infiltrate, likely represent pneumonia. 4. Cardiomegaly with pleural effusions. CT OF THE  ABDOMEN AND the PELVIS (NON VASCULAR): 1. Atrophic right kidney, with diminished enhancement, likely due to severe stenosis of the right renal artery. 2. Apparent mural thickening in the rectum, as indicated on the prior unenhanced CT is may be due to adherent stool or neoplasm. Follow-up with colonoscopy or CT recommended. 3. Mild compression fracture deformity in the L1 vertebral body. RECOMMENDATIONS: Unavailable       ASSESSMENT:    Principal Problem:    Syncope  Active Problems:    AAA (abdominal aortic aneurysm) (HCC)    Pneumonia  Resolved Problems:    * No resolved hospital problems. *      PLAN:  1. Appreciate input of vascular  2. Long discussion with family at bedside including his wife and his 2 nephews.   3.  Maintain coverage for pneumonic process though he has not really perked up a lot or started to eat  4. Possibly just the likelihood of the processes of cancer gradually taking over  5. Maintain DNR CC with all the medications to the family is able to decide a appropriate discharge plan        Diet: ADULT DIET; Dysphagia - Soft and Bite Sized; Moderately Thick (Honey)  Code Status: DNR-CC  Surrogate decision maker confirmed with patient:   Extended Emergency Contact Information  Primary Emergency Contact: Ashlyn Barlow  Address: [address] [place] [address], [place] [address]  Home Phone: [phone]  Relation: Spouse  Preferred language: English  Secondary Emergency Contact: Nani Ross  Mobile Phone: [phone]  Relation: Niece/Nephew      DVT Prophylaxis: []Lovenox [x]Heparin [x]PCD [] [address] []Encouraged ambulation  Disposition: []Med/Surg [x] Intermediate [] ICU/CCU  Admit status: [] Observation [x] Inpatient     +++++++++++++++++++++++++++++++++++++++++++++++++  [address] [place], [place]  +++++++++++++++++++++++++++++++++++++++++++++++++  NOTE: This report was transcribed using voice recognition software. Every effort was made to ensure accuracy; however, inadvertent computerized transcription errors may be present.

## 2022-01-08 NOTE — PROGRESS NOTES
Answering service for [facility] contacted to confirm consult was received, spoke to [facility] [place] who stated would confirm with RN. GER Abraham also confirmed family choice was [facility].  Electronically signed by Deepali Maldonado RN on 1/8/2022 at 9:57 AM

## 2022-01-08 NOTE — PROGRESS NOTES
Nephew and wife in room, Hospice to call nephews call phone to make arrangements to meet. Wife does no have cell phone available at this time.  Electronically signed by Luiz Jordan RN on 1/8/2022 at 9:22 AM

## 2022-01-09 NOTE — PROGRESS NOTES
Internal Medicine Progress Note      Synopsis: Patient admitted on 1/7/2022  Mr. Simon Madrigal, a 81y.o. year old male who has a past medical history of A-fib ([facility] [address] AAA (abdominal aortic aneurysm) ([facility] [address] Cancer ([facility] [address] CHF (congestive heart failure) ([facility] [address] COPD (chronic obstructive pulmonary disease) ([facility] [address] Heart failure ([address] Hyperlipidemia, and Hypertension. 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 He was discharged as a DNR CC to the facility for rehab and had what appears to be syncopal episode with hypoxemia. He was brought in. Worked up for an abdominal aneurysm which is not operative obviously and patient's family is now considering discharge with rehab since they do not have coverage for actual onsite hospice care though they are willing to provide him comfort level of care     Subjective    Wife at bedside. Patient is a lot more alert today. He is asking for water however he has to have a thickener and all of his liquids  He is definitely more communicative today and has had a few bites of food to eat  Exam:  BP (!) 174/70   Pulse 88   Temp 96.7 °F (35.9 °C) (Axillary)   Resp 20   Ht 5' 7\" (1.702 m)   Wt 148 lb (67.1 kg)   SpO2 95%   BMI 23.18 kg/m²   General appearance: No apparent distress, appears stated age and cooperative. HEENT: Pupils equal, round, and reactive to light. Conjunctivae/corneas clear. Neck:. Trachea midline. Respiratory: Distant and decreased cardiovascular: Regular rate and rhythm with normal S1/S2 without murmurs, rubs or gallops. Abdomen: Soft, non-tender, non-distended with normal bowel sounds. Musculoskeletal: No clubbing, cyanosis or edema bilaterally.  .   Skin:  No rashes    Neurologic: awake, alert and following commands   Though he is weak  Medications:  Reviewed    Infusion Medications    sodium chloride      sodium chloride 12.5 mL/hr at 01/08/22 1739     Scheduled Medications    dilTIAZem  120 mg Oral Daily    ipratropium-albuterol  3 mL Inhalation Q4H WA    lactulose  10 g Oral BID    metoprolol succinate  100 mg Oral BID    sodium chloride flush  10 mL IntraVENous 2 times per day    cefepime  2,000 mg IntraVENous Q24H    heparin (porcine)  5,000 Units SubCUTAneous Q8H     PRN Meds: sodium chloride flush, sodium chloride, ondansetron **OR** ondansetron, magnesium hydroxide, acetaminophen **OR** acetaminophen, white petrolatum    I/O    Intake/Output Summary (Last 24 hours) at 1/9/2022 1205  Last data filed at 1/9/2022 0503  Gross per 24 hour   Intake    Output 600 ml   Net -600 ml       Labs:   Recent Labs     01/07/22 1738 01/08/22 0311   WBC 14.0* 16.5*   HGB 14.7 13.3   HCT 46.7 42.4    177       Recent Labs     01/07/22 1738 01/08/22 0311    145   K 4.3 3.4*   CL 95* 99   CO2 28 29   BUN 62* 63*   CREATININE 1.9* 1.8*   CALCIUM 9.0 8.7       Recent Labs     01/07/22 1738 01/07/22 2022   PROT 5.5*  --    ALKPHOS 120  --    *  --    *  --    BILITOT 3.0*  --    LIPASE  --  11*       No results for input(s): INR in the last 72 hours. No results for input(s): Richa Divine in the last 72 hours. Chronic labs:  Lab Results   Component Value Date    CHOL 95 06/25/2021    TRIG 68 06/25/2021    HDL 38 06/25/2021    LDLCALC 43 06/25/2021    TSH 3.130 06/16/2021    INR 2.2 12/30/2021       Radiology:  CT ABDOMEN PELVIS WO CONTRAST Additional Contrast? None    Result Date: 1/7/2022  1. Compared to 07/05/2021 (approximately 6 months ago), there is INTERVAL ENLARGEMENT OF THE INFRARENAL AORTIC ANEURYSM FROM 5.0 x 4.4 cm to 5.5 x 4.9 cm. Possible intramural hematoma at level of the enlarging aneurysm. No current evidence of rupture. Vascular surgery consultation recommended. 2. Questionable mural thickening in the rectum. Follow-up with colonoscopy or follow-up CT recommended.  3. Mild increase in the perinephric edema. Clinical correlation for medical renal disease recommended. 4. Right lower lobe infiltrate, likely pneumonia. 5. Cardiomegaly with small bilateral pleural effusions. 6. Mild compression fracture deformity of the L1 vertebral body, which has developed in the interim since 07/15/2021. RECOMMENDATIONS: Unavailable     XR CHEST (2 VW)    Result Date: 12/29/2021  Left post thoracotomy change with ipsilateral volume loss. Right suprahilar known mass, similar to prior. Separately, both lungs show worsening opacities which may reflect worsening metastatic lung disease. XR LUMBAR SPINE (2-3 VIEWS)    Result Date: 12/31/2021  Redemonstration of an acute or subacute compression fracture of L1 with estimated 25% vertebral body height loss. No significant change in alignment. Multilevel spondylosis. Atherosclerosis of an abdominal aortic aneurysm. See recent CT from 12/29/2021 for more accurate assessment of the abdominal aorta and recommendations. CT HEAD WO CONTRAST    Result Date: 1/7/2022  1. No acute intracranial abnormality. 2. Chronic small vessel ischemic disease and generalized cerebral volume loss. 3. Pansinusitis. RECOMMENDATIONS: Unavailable     CT CHEST WO CONTRAST    Result Date: 12/30/2021  1. New multifocal sub solid consolidations bilaterally. Findings favor multifocal pneumonia, although recurrent metastatic disease is not excluded. Recommend attention on follow-up exam. 2. Background of moderate emphysema. 3. Osteoporotic compression fracture of L1 with estimated 25% vertebral body height loss is new since June 2021 and otherwise age indeterminate. No significant bony retropulsion. RECOMMENDATIONS: Unavailable     CT LUMBAR SPINE WO CONTRAST    Result Date: 12/29/2021  1. New acute appearing compression fracture of the L1 vertebral body 2. Degenerative spondylolisthesis at L2-L3 causing moderate spinal stenosis 3. Degenerative disc disease at L4-L5, stable 4. Increased size of the abdominal aorta aneurysm, measuring RECOMMENDATIONS: Fusiform AAA Size:    Follow-up Recommendation1: 2.6-2.9 cm     Every 5 years 2 3.0-3.4 cm       Every 3 years 3.5-3.9 cm      Every 2 years 4.0-4.4 cm     Every 12 months,  recommend vascular consultation 4.5-5.4 cm     Every 6 months,  recommend vascular consultation >5.5 cm        Referral to vascular specialist ______________________________________________________________________________ _______ 1 Based on ACR White paper: IJ am Laura Radioli 2013;10:789-792.  2 For aortas of maximum diameter 2.6--2.9 cm meeting the criteria for AAA (>1.5 x proximal normal segment; no f/u if < 1.5 x proximal normal segment; no f/u for aortas < 2.6 cm) Note:  AAA enlargement of >0.5 cm in 6 months or >1 cm in 1 year, recommend vascular consultation Note: Saccular AAA of any size, recommend vascular consultation     XR CHEST PORTABLE    Result Date: 1/7/2022  Bilateral pulmonary infiltrates. Small left pleural effusion. CTA CHEST W CONTRAST    Result Date: 1/7/2022  CT ANGIOGRAPHY OF THE CHEST, ABDOMEN AND PELVIS: 1. Infrarenal aortic aneurysm measuring 5.5 x 4.9 cm, increased from 5.0 x 4.4 cm proximally 6 months ago. 2. Hyperdensity within the intramural thrombus within the aneurysmal segment of the abdominal aorta possibly representing intramural hemorrhage. 3. No evidence of aortic dissection or rupture. 4. Prominent stenoses in the common iliac arteries bilaterally. CT OF THE CHEST (NON VASCULAR): 1. Right upper lobe masslike opacity, grossly stable as of 12/30/2021, likely neoplastic. 2. Since 12/30/2021, there is mild decrease in the scattered ground-glass opacities in the lungs, which likely represent pneumonia. 3. Right lower lobe persistent infiltrate, likely represent pneumonia. 4. Cardiomegaly with pleural effusions. CT OF THE  ABDOMEN AND the PELVIS (NON VASCULAR): 1.  Atrophic right kidney, with diminished enhancement, likely due to severe stenosis of the right renal artery. 2. Apparent mural thickening in the rectum, as indicated on the prior unenhanced CT is may be due to adherent stool or neoplasm. Follow-up with colonoscopy or CT recommended. 3. Mild compression fracture deformity in the L1 vertebral body. RECOMMENDATIONS: Unavailable     CTA ABDOMEN PELVIS W CONTRAST    Result Date: 1/7/2022  CT ANGIOGRAPHY OF THE CHEST, ABDOMEN AND PELVIS: 1. Infrarenal aortic aneurysm measuring 5.5 x 4.9 cm, increased from 5.0 x 4.4 cm proximally 6 months ago. 2. Hyperdensity within the intramural thrombus within the aneurysmal segment of the abdominal aorta possibly representing intramural hemorrhage. 3. No evidence of aortic dissection or rupture. 4. Prominent stenoses in the common iliac arteries bilaterally. CT OF THE CHEST (NON VASCULAR): 1. Right upper lobe masslike opacity, grossly stable as of 12/30/2021, likely neoplastic. 2. Since 12/30/2021, there is mild decrease in the scattered ground-glass opacities in the lungs, which likely represent pneumonia. 3. Right lower lobe persistent infiltrate, likely represent pneumonia. 4. Cardiomegaly with pleural effusions. CT OF THE  ABDOMEN AND the PELVIS (NON VASCULAR): 1. Atrophic right kidney, with diminished enhancement, likely due to severe stenosis of the right renal artery. 2. Apparent mural thickening in the rectum, as indicated on the prior unenhanced CT is may be due to adherent stool or neoplasm. Follow-up with colonoscopy or CT recommended. 3. Mild compression fracture deformity in the L1 vertebral body. RECOMMENDATIONS: Unavailable     ASSESSMENT:    Principal Problem:    Syncope  Active Problems:    AAA (abdominal aortic aneurysm) (HCC)    Pneumonia  Resolved Problems:    * No resolved hospital problems. *  Healthcare acquired pneumonia     PLAN:      1 vitals are stable. And 5 blood pressure slightly high  2. WBCs awaited though I expect that labs look better  3.   Known history of lung cancer  4. No complaints of pain    Discussed the family again at bedside. They would like him discharged back to skilled facility as a [facility] and then depending on his symptoms they will request palliative care  Diet: ADULT DIET; Dysphagia - Soft and Bite Sized; Moderately Thick (Honey)  Code Status: DNR-CC  PT/OT Eval Status:   Can order  DVT Prophylaxis:   In place  Recommended disposition at discharge:    Back to [facility] hopefully by tomorrow  +++++++++++++++++++++++++++++++++++++++++++++++++  Christiana Severin, MD   [facility].  +++++++++++++++++++++++++++++++++++++++++++++++++  NOTE: This report was transcribed using voice recognition software. Every effort was made to ensure accuracy; however, inadvertent computerized transcription errors may be present.

## 2022-01-09 NOTE — CONSULTS
Palliative Medicine  Progress Note    Goals of care in place. Plan is to return to SNF, comfort is goals and transition to hospice will be made late while at facility. Seen by hospice during this encounter. There are no PM needs at this time. PM will now sign off. Rolly OWENS-PORTER  Palliative Medicine    Note: This report was completed using [employer] voiced recognition software. Every effort has been made to ensure accuracy; however, inadvertent computerized transcription errors may be present.

## 2022-01-09 NOTE — PROGRESS NOTES
Follow up visit made to unit. Patient is sitting up in bed, alert and oriented. Met with wife and nephew outside of room. Plan is rehab and will transition into hospice care at a later time. Answered all questions. Will continue to follow until discharge. Updated Bedside RN.

## 2022-01-10 NOTE — PROGRESS NOTES
Follow-up with Case management. Plan is skilled. Case management will call hospice liaison if plan changes.   Electronically signed by Moises Miguel RN on 1/10/2022 at 10:41 AM

## 2022-01-10 NOTE — PATIENT CARE CONFERENCE
[facility] Quality Flow/Interdisciplinary Rounds Progress Note        Quality Flow Rounds held on January 10, 2022    Disciplines Attending:  Bedside Nurse, ,  and Nursing Unit Leadership    Ronald Page was admitted on 1/7/2022  4:48 PM    Anticipated Discharge Date:  Expected Discharge Date: 01/11/22    Disposition:    Josafat Score:  Josafat Scale Score: 15    Readmission Risk              Risk of Unplanned Readmission:  24           Discussed patient goal for the day, patient clinical progression, and barriers to discharge.   The following Goal(s) of the Day/Commitment(s) have been identified:  Diagnostics - Report Results and Labs - Report Results      Claudette Hughes RN  January 10, 2022

## 2022-01-10 NOTE — CARE COORDINATION
Social Work discharge planning   Per Blane Sácnhez with [facility], they CAN accept pt with 11 L continuous 02. They will need to order more DME and can accept pt tomorrow. GRETCHEN discussed with ELIO Bradford.   Electronically signed by Veronica Jackson on 1/10/2022 at 3:53 PM

## 2022-01-10 NOTE — PROGRESS NOTES
Nutrition Assessment     Type and Reason for Visit: Initial,Positive Nutrition Screen    Nutrition Assessment:  Pt to be assessed d/t (+) nutrition screen for wt loss/poor intake on adm. Chart reviewed. H/o Metastatic Lung CA and noted plan for comfort care only at this time. Will sign-off. Please consult if RD needed.     Electronically signed by James Browning RD, LD on 1/10/22 at 3:20 PM EST    Contact: ext [phone]

## 2022-01-10 NOTE — PROGRESS NOTES
Wound / ostomy consulted for this Pt admitted with syncope. History includes: COPD, HLD, HTN, CHF, A-fib, R lung cancer. The Pt has a stage 2 sacral pressure injury. The wound is red in color. He is incontinent. Recommend Aquaphor topically. Appetite is poor. Both dorsal feet have a dry , red rash. Family states he was getting treatment at the nursing home but they do not know what the treatment is. Recommend Lotrisone topically and off load heels. The Pt is on a low air loss bed with SOS precautions. **Informed Consent**    The patient has given verbal consent to have photos taken of sacrum, feet  and inserted into their chart as part of their permanent medical record for purposes of documentation, treatment management and/or medical review. All Images taken on 1/10/22 of patient name: Red Arteaga were transmitted and stored on secured [employer] located within [facility] by a registered [employer] Mobile Application Device.

## 2022-01-10 NOTE — PROGRESS NOTES
Physical Therapy    Facility/Department: [facility] 5W MED SURG     NAME: Mendoza Bhat  : 1935  MRN: [id number]    Date of Service: 1/10/2022    PT consult was received and after reviewing pt's chart it is noted around 22 he was found to have an L1 compression fx and is supposed to wear a TLSO brace. After speaking with pt and wife they report the soft TLSO brace is too big and bulky and causes him pain when he get up with it on and it is too heavy. They also report he came in from [facility] and TLSO brace is still there at [facility]. Will hold PT eval at this time until TLSO brace is either brought in, a different back brace is ordered and brought to his room, or an order is placed stating pt no longer needs to wear a back brace. Will re-attempt PT eval another time. Elodia Paz., P.T.   License Number: [id number]

## 2022-01-10 NOTE — PROGRESS NOTES
Subjective: The patient is awake and alert. Frail appearing  No acute events overnight. Denies chest pain, angina,   11 liters O2    Objective:    /71   Pulse 78   Temp 98.8 °F (37.1 °C) (Axillary)   Resp 18   Ht 5' 7\" (1.702 m)   Wt 148 lb (67.1 kg)   SpO2 94%   BMI 23.18 kg/m²     In: -   Out: 200   In: -   Out: 200 [Urine:200]    General appearance: NAD, conversant, frail appearing  HEENT: AT/NC, MMM  Neck: FROM, supple  Lungs: Crackles, 11 liters O2. CV: RRR, no MRGs  Vasc: Radial pulses 2+  Abdomen: Soft, non-tender; no masses or HSM  Extremities: No peripheral edema or digital cyanosis  Skin: no rash, lesions or ulcers  Psych: Alert and oriented to person, place and time  Neuro: Alert and interactive     Recent Labs     01/08/22  0311 01/09/22  1342 01/10/22  0352   WBC 16.5* 15.0* 18.7*   HGB 13.3 13.2 14.0   HCT 42.4 41.6 44.9    210 228       Recent Labs     01/07/22  1738 01/08/22  0311 01/09/22  1342    145 144   K 4.3 3.4* 3.2*   CL 95* 99 104   CO2 28 29 29   BUN 62* 63* 71*   CREATININE 1.9* 1.8* 1.9*   CALCIUM 9.0 8.7 7.4*       Assessment:    Principal Problem:    Syncope  Active Problems:    AAA (abdominal aortic aneurysm) (HCC)    Pneumonia  Resolved Problems:    * No resolved hospital problems. *      Plan:  81year old male with a significant past medical history admitted to med surg unit with    Aortic Aneurysm  Vascular surgery-Patient is a dnrcc and has terminal lung ca and is followed by hospice. Consult palliative medicine  Patient to discharge to facility tomorrow with 11 liters O2. DVT Prophylaxis   PT/OT  Discharge planning       Leticia Corrigan, APRN - CNP  4:12 PM     Discussed with son at bedside   Add appetite stimulant   Dc plan to facility tomorrow on po omnicef x 7 days if wbc comes down     Above note edited to reflect my thoughts     I personally saw, examined and provided care for the patient.  Radiographs, labs and medication list were reviewed by me independently. The case was discussed in detail and plans for care were established. Review of Alaina OWENS- CNP, documentation was conducted and revisions were made as appropriate directly by me. I agree with the above documented exam, problem list, and plan of care.      Marta Vazquez MD  7:20 PM  1/10/2022    1/10/2022

## 2022-01-11 NOTE — PROGRESS NOTES
Patient refused last dose of IV antibiotic. Patient states that he will pull his IV out himself if we do not do as he says.

## 2022-01-11 NOTE — PATIENT CARE CONFERENCE
P Quality Flow/Interdisciplinary Rounds Progress Note        Quality Flow Rounds held on January 11, 2022    Disciplines Attending:  Bedside Nurse, ,  and Nursing Unit Leadership    Damian Victor was admitted on 1/7/2022  4:48 PM    Anticipated Discharge Date:  Expected Discharge Date: 01/11/22    Disposition:    Josafat Score:  Josafat Scale Score: 15    Readmission Risk              Risk of Unplanned Readmission:  25           Discussed patient goal for the day, patient clinical progression, and barriers to discharge.   The following Goal(s) of the Day/Commitment(s) have been identified:  Labs - Report Results      Saray Vivas RN  January 11, 2022

## 2022-01-11 NOTE — CARE COORDINATION
Social Work discharge planning   Pt returning to [address] [place] [address] skilled at 4p via [facility]. NP Roselyn Plan GER Mckeon, RN Mark Sinha, Buzz Case with snf, and wife at [phone] all aware.   Electronically signed by Duane Richmond on 1/11/2022 at 12:49 PM

## 2022-01-11 NOTE — DISCHARGE SUMMARY
Physician Discharge Summary     Patient ID:  Naveed Montemayor  [id number]  81 y.o.  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 male of Bonny Ramirez MD who  has a past medical history of A-fib ([address] AAA (abdominal aortic aneurysm) ([address] Cancer ([address] CHF (congestive heart failure) ([address] COPD (chronic obstructive pulmonary disease) ([address] Heart failure ([address] Hyperlipidemia, and Hypertension. 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 He was discharged as a DNR CC to the facility for rehab and had what appears to be syncopal episode with hypoxemia. He was brought in. Worked up for an abdominal aneurysm which is not operative obviously and patient's family is now considering discharge with rehab since they do not have coverage for actual onsite hospice care though they are willing to provide him comfort level of care    Aortic Aneurysm  Vascular surgery-Patient is a dnrcc and has terminal lung ca and is followed by hospice.   Consult palliative medicine  Patient to discharge to facility tomorrow with 11 liters O2. Patient to discharge with omnicef for 7 days. Patient discharged to SNF in stable condition with medications listed below. Patient was instructed to follow up with all consults and PCP within the next two week. Recent Labs     01/09/22  1342 01/10/22  0352 01/11/22  0327   WBC 15.0* 18.7* 17.9*   HGB 13.2 14.0 13.5   HCT 41.6 44.9 43.2    228 195       Recent Labs     01/09/22  1342      K 3.2*      CO2 29   BUN 71*   CREATININE 1.9*   CALCIUM 7.4*       CT ABDOMEN PELVIS WO CONTRAST Additional Contrast? None    Result Date: 1/7/2022  EXAMINATION: CT OF THE ABDOMEN AND PELVIS WITHOUT CONTRAST 1/7/2022 7:39 pm TECHNIQUE: CT of the abdomen and pelvis was performed without the administration of intravenous contrast. Multiplanar reformatted images are provided for review. Dose modulation, iterative reconstruction, and/or weight based adjustment of the mA/kV was utilized to reduce the radiation dose to as low as reasonably achievable. COMPARISON: CT of the abdomen and pelvis, 07/15/2021 HISTORY: ORDERING SYSTEM PROVIDED HISTORY: elevated liver enzymes, urinary retention TECHNOLOGIST PROVIDED HISTORY: Reason for exam:->elevated liver enzymes, urinary retention Additional Contrast?->None FINDINGS: Lower Chest: Pulmonary infiltrate in the lower lobe likely represents pneumonia. The heart is enlarged. Small bilateral pleural effusions. Elevated left hemidiaphragm. Organs: Liver: Unremarkable. Gallbladder: Unremarkable. Pancreas: Unremarkable. Spleen:  Unremarkable. Adrenals: Stable small low-density right adrenal mass consistent with adenoma. Kidneys: The right kidney is atrophic. No stone or hydronephrosis. Moderate perinephric edema, mildly increased as of 07/15/2021. GI/Bowel: Severe distal colonic diverticulosis without diverticulitis. Questionable mural thickening versus peripheral liquid stool in the distal colon. Normal appendix. Pelvis:  The urinary bladder is decompressed by a  Alejandra catheter. It is therefore not well evaluated. Within this limitation, no gross abnormality is detected. The prostate gland is grossly normal in size. Peritoneum/Retroperitoneum: Aneurysmal dilation of the infrarenal aorta to 5.5 x 4.9 cm. On 07/15/2021, approximately 6 months ago, it measured 5.0 x 4.4 cm. Hyperdensity along margins of the aneurysm may indicate intramural hematoma. Severe calcified atherosclerosis seen in the pelvic arteries, especially the common iliac arteries. No aneurysm in the pelvic arteries. No lymphadenopathy. No free air. Bones/Soft Tissues: Mild age indeterminate compression fracture deformity of the L1 vertebral body which has developed in the interim since 07/15/2021. No lytic or blastic lesion is seen. 1. Compared to 07/05/2021 (approximately 6 months ago), there is INTERVAL ENLARGEMENT OF THE INFRARENAL AORTIC ANEURYSM FROM 5.0 x 4.4 cm to 5.5 x 4.9 cm. Possible intramural hematoma at level of the enlarging aneurysm. No current evidence of rupture. Vascular surgery consultation recommended. 2. Questionable mural thickening in the rectum. Follow-up with colonoscopy or follow-up CT recommended. 3. Mild increase in the perinephric edema. Clinical correlation for medical renal disease recommended. 4. Right lower lobe infiltrate, likely pneumonia. 5. Cardiomegaly with small bilateral pleural effusions. 6. Mild compression fracture deformity of the L1 vertebral body, which has developed in the interim since 07/15/2021. RECOMMENDATIONS: Unavailable     CT HEAD WO CONTRAST    Result Date: 1/7/2022  EXAMINATION: CT OF THE HEAD WITHOUT CONTRAST  1/7/2022 5:01 pm TECHNIQUE: CT of the head was performed without the administration of intravenous contrast. Dose modulation, iterative reconstruction, and/or weight based adjustment of the mA/kV was utilized to reduce the radiation dose to as low as reasonably achievable. COMPARISON: None.  HISTORY: ORDERING SYSTEM PROVIDED HISTORY: [facility] cancer TECHNOLOGIST PROVIDED HISTORY: Has a \"code stroke\" or \"stroke alert\" been called? ->No Reason for exam:->[facility] cancer Decision Support Exception - unselect if not a suspected or confirmed emergency medical condition->Emergency Medical Condition (MA) FINDINGS: There is patchy hypoattenuation within the white matter of the bilateral cerebral hemispheres. There is no evidence of mass, mass effect, or midline shift. There is enlargement of the ventricles and sulci suggesting generalized cerebral volume loss. No extra-axial fluid collections or acute hemorrhage. The gray-white differentiation appears preserved without evidence of acute cortical ischemia. The calvarium is intact. There is mucosal thickening and fluid throughout the paranasal sinuses. There are bilateral lens replacements. There are trace bilateral mastoid effusions. 1. No acute intracranial abnormality. 2. Chronic small vessel ischemic disease and generalized cerebral volume loss. 3. Pansinusitis. RECOMMENDATIONS: Unavailable     XR CHEST PORTABLE    Result Date: 1/7/2022  EXAMINATION: ONE XRAY VIEW OF THE CHEST 1/7/2022 4:45 pm COMPARISON: 12/29/2021 HISTORY: ORDERING SYSTEM PROVIDED HISTORY: LOC TECHNOLOGIST PROVIDED HISTORY: Reason for exam:->LOC FINDINGS: Bilateral pulmonary infiltrates. Left pleural effusion. No pneumothorax. Cardiomegaly. The osseous structures are without acute process. Bilateral pulmonary infiltrates. Small left pleural effusion. CTA CHEST W CONTRAST    Result Date: 1/7/2022  EXAMINATION: CTA OF THE CHEST; CTA OF THE ABDOMEN AND PELVIS WITH CONTRAST 1/7/2022 8:25 pm TECHNIQUE: CTA of the chest was performed after the administration of intravenous contrast.  Multiplanar reformatted images are provided for review. MIP images are provided for review.  Dose modulation, iterative reconstruction, and/or weight based adjustment of the mA/kV was utilized to reduce the radiation dose to as low as reasonably achievable.; CTA of the abdomen and pelvis was performed with the administration of intravenous contrast. Multiplanar reformatted images are provided for review. MIP images are provided for review. Dose modulation, iterative reconstruction, and/or weight based adjustment of the mA/kV was utilized to reduce the radiation dose to as low as reasonably achievable. COMPARISON: None. HISTORY: ORDERING SYSTEM PROVIDED HISTORY: AAA TECHNOLOGIST PROVIDED HISTORY: Reason for exam:->AAA FINDINGS: CT ANGIOGRAPHY OF THE CHEST, ABDOMEN AND PELVIS: *Moderate calcified atherosclerosis in the thoracic aorta which is tortuous but not aneurysmal.  No dissection or rupture. *Severe calcified atherosclerosis in the abdominal aorta. *Aneurysmal dilation of the infrarenal aorta to approximately 5.5 x 4.9 cm. Approximately 6 months ago it measured 5.0 x 4.4 cm. *Hyperdensity within intramural thrombus raises the possibility of intramural hematoma. *No evidence of aortic rupture or dissection. *Severe stenosis at the origin of the celiac trunk *Mild stenosis at the origin of the SMA. Prominent stenoses at the origins of both renal arteries. *Prominent calcified atherosclerosis in the common iliac arteries resulting in moderate to severe grade stenoses in the proximal right common iliac artery as well as the distal left common iliac artery. CT OF THE CHEST, ABDOMEN AND PELVIS (NON ANGIOGRAPHIC PORTION OF THE STUDY): MEDIASTINUM: The heart is enlarged. No pericardial effusion. The main pulmonary artery is top-normal in caliber at 2.9 cm. LUNGS: Masslike nodular opacity in the right upper lobe measures approximately 2.8 x 2.1 cm (axial sequence image 197). The nodular infiltrate previously seen in the anterior aspect of the right lower lobe is currently smaller (axial sequence image 235). Subtle ground-glass opacities previously seen in the left upper lobe have mostly resolved. The central airway is clear. No pneumothorax. Small bilateral pleural effusions. BONES/SOFT TISSUES: The visualized bones are intact without fracture or focal lesion. ORGANS: Liver: Unremarkable. Gallbladder: Unremarkable. Pancreas: Moderately atrophic. Otherwise, unremarkable. Spleen:  Unremarkable. Adrenals: Unremarkable. Kidneys: The right kidney is atrophic and relatively hypoenhancing. Findings may be the result of severe stenosis at the origin of the right renal artery. GI/BOWEL: No bowel wall thickening or obstruction is seen. Prominent distal colonic diverticulosis without diverticulitis. The apparent thickening of the rectum as suggested on the unenhanced CT of the abdomen and pelvis may be due to adherent stool or neoplasm. PERITONEUM/EXTRA PERITONEUM: No lymphadenopathy. No free air or free fluid is seen. PELVIS: The urinary bladder is decompressed by a  Alejandra catheter. It is therefore not well evaluated. Within this limitation, no gross abnormality is detected. The prostate gland is normal in size. BONES/SOFT TISSUES: Mild compression fracture deformity of the L1 vertebral body. CT ANGIOGRAPHY OF THE CHEST, ABDOMEN AND PELVIS: 1. Infrarenal aortic aneurysm measuring 5.5 x 4.9 cm, increased from 5.0 x 4.4 cm proximally 6 months ago. 2. Hyperdensity within the intramural thrombus within the aneurysmal segment of the abdominal aorta possibly representing intramural hemorrhage. 3. No evidence of aortic dissection or rupture. 4. Prominent stenoses in the common iliac arteries bilaterally. CT OF THE CHEST (NON VASCULAR): 1. Right upper lobe masslike opacity, grossly stable as of 12/30/2021, likely neoplastic. 2. Since 12/30/2021, there is mild decrease in the scattered ground-glass opacities in the lungs, which likely represent pneumonia. 3. Right lower lobe persistent infiltrate, likely represent pneumonia. 4. Cardiomegaly with pleural effusions. CT OF THE  ABDOMEN AND the PELVIS (NON VASCULAR): 1.  Atrophic right kidney, with diminished enhancement, likely due to severe stenosis of the right renal artery. 2. Apparent mural thickening in the rectum, as indicated on the prior unenhanced CT is may be due to adherent stool or neoplasm. Follow-up with colonoscopy or CT recommended. 3. Mild compression fracture deformity in the L1 vertebral body. RECOMMENDATIONS: Unavailable     CTA ABDOMEN PELVIS W CONTRAST    Result Date: 1/7/2022  EXAMINATION: CTA OF THE CHEST; CTA OF THE ABDOMEN AND PELVIS WITH CONTRAST 1/7/2022 8:25 pm TECHNIQUE: CTA of the chest was performed after the administration of intravenous contrast.  Multiplanar reformatted images are provided for review. MIP images are provided for review. Dose modulation, iterative reconstruction, and/or weight based adjustment of the mA/kV was utilized to reduce the radiation dose to as low as reasonably achievable.; CTA of the abdomen and pelvis was performed with the administration of intravenous contrast. Multiplanar reformatted images are provided for review. MIP images are provided for review. Dose modulation, iterative reconstruction, and/or weight based adjustment of the mA/kV was utilized to reduce the radiation dose to as low as reasonably achievable. COMPARISON: None. HISTORY: ORDERING SYSTEM PROVIDED HISTORY: AAA TECHNOLOGIST PROVIDED HISTORY: Reason for exam:->AAA FINDINGS: CT ANGIOGRAPHY OF THE CHEST, ABDOMEN AND PELVIS: *Moderate calcified atherosclerosis in the thoracic aorta which is tortuous but not aneurysmal.  No dissection or rupture. *Severe calcified atherosclerosis in the abdominal aorta. *Aneurysmal dilation of the infrarenal aorta to approximately 5.5 x 4.9 cm. Approximately 6 months ago it measured 5.0 x 4.4 cm. *Hyperdensity within intramural thrombus raises the possibility of intramural hematoma. *No evidence of aortic rupture or dissection. *Severe stenosis at the origin of the celiac trunk *Mild stenosis at the origin of the SMA.   Prominent stenoses at the origins of both renal arteries. *Prominent calcified atherosclerosis in the common iliac arteries resulting in moderate to severe grade stenoses in the proximal right common iliac artery as well as the distal left common iliac artery. CT OF THE CHEST, ABDOMEN AND PELVIS (NON ANGIOGRAPHIC PORTION OF THE STUDY): MEDIASTINUM: The heart is enlarged. No pericardial effusion. The main pulmonary artery is top-normal in caliber at 2.9 cm. LUNGS: Masslike nodular opacity in the right upper lobe measures approximately 2.8 x 2.1 cm (axial sequence image 197). The nodular infiltrate previously seen in the anterior aspect of the right lower lobe is currently smaller (axial sequence image 235). Subtle ground-glass opacities previously seen in the left upper lobe have mostly resolved. The central airway is clear. No pneumothorax. Small bilateral pleural effusions. BONES/SOFT TISSUES: The visualized bones are intact without fracture or focal lesion. ORGANS: Liver: Unremarkable. Gallbladder: Unremarkable. Pancreas: Moderately atrophic. Otherwise, unremarkable. Spleen:  Unremarkable. Adrenals: Unremarkable. Kidneys: The right kidney is atrophic and relatively hypoenhancing. Findings may be the result of severe stenosis at the origin of the right renal artery. GI/BOWEL: No bowel wall thickening or obstruction is seen. Prominent distal colonic diverticulosis without diverticulitis. The apparent thickening of the rectum as suggested on the unenhanced CT of the abdomen and pelvis may be due to adherent stool or neoplasm. PERITONEUM/EXTRA PERITONEUM: No lymphadenopathy. No free air or free fluid is seen. PELVIS: The urinary bladder is decompressed by a  Alejandra catheter. It is therefore not well evaluated. Within this limitation, no gross abnormality is detected. The prostate gland is normal in size. BONES/SOFT TISSUES: Mild compression fracture deformity of the L1 vertebral body.      CT ANGIOGRAPHY OF THE CHEST, ABDOMEN AND PELVIS: 1. Infrarenal aortic aneurysm measuring 5.5 x 4.9 cm, increased from 5.0 x 4.4 cm proximally 6 months ago. 2. Hyperdensity within the intramural thrombus within the aneurysmal segment of the abdominal aorta possibly representing intramural hemorrhage. 3. No evidence of aortic dissection or rupture. 4. Prominent stenoses in the common iliac arteries bilaterally. CT OF THE CHEST (NON VASCULAR): 1. Right upper lobe masslike opacity, grossly stable as of 12/30/2021, likely neoplastic. 2. Since 12/30/2021, there is mild decrease in the scattered ground-glass opacities in the lungs, which likely represent pneumonia. 3. Right lower lobe persistent infiltrate, likely represent pneumonia. 4. Cardiomegaly with pleural effusions. CT OF THE  ABDOMEN AND the PELVIS (NON VASCULAR): 1. Atrophic right kidney, with diminished enhancement, likely due to severe stenosis of the right renal artery. 2. Apparent mural thickening in the rectum, as indicated on the prior unenhanced CT is may be due to adherent stool or neoplasm. Follow-up with colonoscopy or CT recommended. 3. Mild compression fracture deformity in the L1 vertebral body. RECOMMENDATIONS: Unavailable       Discharge Exam:    HEENT: NCAT,  PERRLA, No JVD  Heart:  RRR, no murmurs, gallops, or rubs.   Lungs:  CTA bilaterally, no wheeze, rales or rhonchi  Abd: bowel sounds present, nontender, nondistended, no masses  Extrem:  No clubbing, cyanosis, or edema    Disposition: [facility]     Patient Condition at Discharge: Stable    Patient Instructions:      Medication List        START taking these medications      cefdinir 300 MG capsule  Commonly known as: OMNICEF  Take 1 capsule by mouth 2 times daily for 7 days            CONTINUE taking these medications      apixaban 2.5 MG Tabs tablet  Commonly known as: ELIQUIS  Take 1 tablet by mouth 2 times daily     bumetanide 1 MG tablet  Commonly known as: BUMEX  Take 1 tablet by mouth daily     dilTIAZem 120 MG extended release capsule  Commonly known as: CARDIZEM CD  Take 1 capsule by mouth daily     docusate sodium 100 MG capsule  Commonly known as: Colace  Take 1 capsule by mouth 2 times daily as needed for Constipation     ipratropium-albuterol 0.5-2.5 (3) MG/3ML Soln nebulizer solution  Commonly known as: DUONEB  Inhale 3 mLs into the lungs every 4 hours (while awake)     isosorbide mononitrate 30 MG extended release tablet  Commonly known as: IMDUR  Take 2 tablets by mouth daily     lactulose 10 g packet  Commonly known as: CEPHULAC     metoprolol succinate 100 MG extended release tablet  Commonly known as: TOPROL XL  Take 1 tablet by mouth 2 times daily     ondansetron 4 MG disintegrating tablet  Commonly known as: ZOFRAN-ODT  Take 1 tablet by mouth every 8 hours as needed for Nausea or Vomiting     Procto-Med HC 2.5 % Crea rectal cream  Generic drug: hydrocortisone     Vitamin D3 50 MCG (2000 UT) Caps     zinc oxide 40 % Pste paste  Commonly known as: Desitin Maximum Strength  Apply a pea size amount to the anal area as needed for irritation for up to 10 days               Where to Get Your Medications        Information about where to get these medications is not yet available    Ask your nurse or doctor about these medications  cefdinir 300 MG capsule       Activity: activity as tolerated  Diet: regular diet    Pt has been advised to: Follow-up with Andree Laurent MD in 1 week. Follow-up with consultants as recommended by them    Note that over 30 minutes was spent in preparing discharge papers, discussing discharge with patient, medication review, etc.    Signed:  GRACIELA York CNP  1/11/2022  2:11 PM    Above note edited to reflect my thoughts     I personally saw, examined and provided care for the patient. Radiographs, labs and medication list were reviewed by me independently. The case was discussed in detail and plans for care were established.  Review of Alaina OHARA CNP, documentation was conducted and revisions were made as appropriate directly by me. I agree with the above documented exam, problem list, and plan of care.      Chivo Kovacs MD  9:08 PM  1/11/2022

## 2022-01-12 NOTE — ED NOTES
PT DC'd to nursing home for hospice.   [facility]     Lorena Pappas RN  01/12/22 [id number]

## 2022-01-12 NOTE — ED PROVIDER NOTES
82 yo male presenting from nursing home after he fell out of bed reportedly. He is a DNR-CC. Not currently involved in hospice. Was recently discharged from the hospital where he was found to have a enlarging AAA. Patient wife was spoken with and she agrees that her goals for him are comfort measures only. We will not pursue aggressive treatment or care. He is awake, appears uncomfortable, complaining of pain to his groin. This is a sudden onset fall today from the nursing facility. Family History   Problem Relation Age of Onset    Cancer Sister         melanoma    Breast Cancer Sister     Heart Disease Brother      Past Surgical History:   Procedure Laterality Date    BRONCHOSCOPY N/A 9/1/2021    BRONCHOSCOPY W/EBUS FNA performed by Javier Mckeon MD at [address] N/A 9/1/2021    BRONCHOSCOPY ADD ON COMPUTER ASSISTED performed by Javier Mckeon MD at [address] [facility]  9/1/2021    BRONCHOSCOPY/TRANSBRONCHIAL NEEDLE BIOPSY performed by Javier Mckeon MD at [address] [facility]  9/1/2021    BRONCHOSCOPY ALVEOLAR LAVAGE performed by Javier Mckeon MD at [address] TEST N/A 06/16/2021    Lexiscan stress test    COLONOSCOPY      years ago    CT NEEDLE BIOPSY LUNG PERCUTANEOUS  7/1/2021    CT NEEDLE BIOPSY LUNG PERCUTANEOUS 7/1/2021 Celena Weaver MD [facility]    LUNG CANCER SURGERY  01/01/2005       Review of Systems   Respiratory: Negative for chest tightness and shortness of breath. Genitourinary: Positive for testicular pain. Musculoskeletal:        Right arm pain   All other systems reviewed and are negative. Physical Exam  Constitutional:       General: He is in acute distress. Appearance: He is well-developed. He is ill-appearing. HENT:      Head: Normocephalic and atraumatic. Eyes:      Pupils: Pupils are equal, round, and reactive to light. Neck:      Thyroid: No thyromegaly. Cardiovascular:      Rate and Rhythm: Normal rate and regular rhythm. Pulmonary:      Effort: Respiratory distress present. Breath sounds: Normal breath sounds. No wheezing. Comments: Increased work of breathing, accessory muscle usage, one-word answers only  Abdominal:      General: There is no distension. Palpations: Abdomen is soft. There is no mass. Tenderness: There is no abdominal tenderness. There is no guarding or rebound. Musculoskeletal:         General: No tenderness. Normal range of motion. Cervical back: Normal range of motion and neck supple. Skin:     General: Skin is warm and dry. Findings: No erythema. Neurological:      Mental Status: He is alert. Cranial Nerves: No cranial nerve deficit. Comments: Oriented to person, knows his wife's name, only able to give one-word answers, on 11 L nasal cannula          Procedures     MDM              --------------------------------------------- PAST HISTORY ---------------------------------------------  Past Medical History:  has a past medical history of A-fib ([facility] [address] AAA (abdominal aortic aneurysm) ([facility] [address] Cancer ([facility] [address] CHF (congestive heart failure) ([facility] [address] COPD (chronic obstructive pulmonary disease) ([facility] [address] Heart failure ([facility] [address] Hyperlipidemia, and Hypertension. Past Surgical History:  has a past surgical history that includes Lung cancer surgery (01/01/2005); cardiovascular stress test (N/A, 06/16/2021); CT NEEDLE BIOPSY LUNG PERCUTANEOUS (7/1/2021); bronchoscopy (N/A, 9/1/2021); bronchoscopy (N/A, 9/1/2021); bronchoscopy (9/1/2021); bronchoscopy (9/1/2021); and Colonoscopy. Social History:  reports that he quit smoking about 20 years ago. His smoking use included cigarettes. He has a 10.00 pack-year smoking history. He has never used smokeless tobacco. He reports previous alcohol use. He reports that he does not use drugs.     Family History: family history includes Breast Cancer in his sister; Cancer in his sister; Heart Disease in his brother. The patients home medications have been reviewed. Allergies: Patient has no known allergies. -------------------------------------------------- RESULTS -------------------------------------------------    Lab  No results found for this visit on 01/11/22. Radiology  No orders to display       ------------------------- NURSING NOTES AND VITALS REVIEWED ---------------------------  Date / Time Roomed:  1/11/2022 10:34 PM  ED Bed Assignment:  13/13    The nursing notes within the ED encounter and vital signs as below have been reviewed. No data found. Oxygen Saturation Interpretation: Abnormal      ------------------------------------------ PROGRESS NOTES ------------------------------------------  Re-evaluation(s):  Patients symptoms show no change  Repeat physical examination is not changed      I have spoken with the spouse and nephew and discussed todays results, in addition to providing specific details for the plan of care and counseling regarding the diagnosis and prognosis. Their questions are answered at this time and they are agreeable with the plan.      --------------------------------- ADDITIONAL PROVIDER NOTES ---------------------------------  Spoke with the family extended periods of time. Patient is DNR-cc. Will not pursue aggressive or life-sustaining treatments interventions at this time. Patient will see pain medications, comfort measures only. Clinical Impression  1. Fall at nursing home, initial encounter    2. DNR (do not resuscitate)    3. Non-small cell cancer of right lung [facility])          Disposition  Patient's disposition: Discharge to nursing home  Patient's condition is serious - DNR-CC.             Jorge Celestin, DO  01/13/22 0023

## 2022-01-14 NOTE — PROGRESS NOTES
Physician Progress Note      Talon Giron  [facility] #:                  [id number]  :                       1935  ADMIT DATE:       2022 4:48 PM  [address] [place] DATE:        2022 5:20 PM  RESPONDING  PROVIDER #:        VIRGEN Martinez MD          QUERY TEXT:    Patient admitted with syncope. Noted to have lung CA and increased size of   AAA. If possible, please document in progress notes and discharge summary if   you are evaluating and/or treating any of the following: The medical record reflects the following:  Risk Factors: Lung CA, AAA increased in size  Clinical Indicators: Pt admitted for syncope. Known h/o lung CA with mets and   h/o AAA. CTA of abdomen->\"1. Infrarenal aortic aneurysm measuring 5.5 x 4.9 cm,   increased from 5.0 x 4.4 cm proximally 6 months ago. 2.Hyperdensity within the intramural thrombus within the aneurysmal segment of   the abdominal aorta possibly representing intramural hemorrhage. 3.No evidence of aortic dissection or rupture. Vascular consult->\"5.5 cm Assx AAA. .. No indication for surgical intervention   at this time - no signs of rupture, though considering size he does have an   indication for repair in the future. .. Per notes wife would never want to go   through a surgical procedure to address this issue. \"  Per H&P->\"Possibly just the likelihood of the processes of cancer gradually   taking over. \"  Treatment: DNR CC, antibiotic, palliative care    Thank you,  Ryann Judd RN, CCDS  Clinical Documentation Improvement Specialist  [email or web address]  [phone]  Options provided:  -- Syncope due to AAA  -- Syncope due to Lung CA  -- Other - I will add my own diagnosis  -- Disagree - Not applicable / Not valid  -- Disagree - Clinically unable to determine / Unknown  -- Refer to Clinical Documentation Reviewer    PROVIDER RESPONSE TEXT:    The syncope is due to AAA.     Query created by: Cate Chowdary on 2022 1:33 PM      Electronically signed by:  VIRGEN Ibarra MD 1/14/2022 4:41 PM

## 2022-01-18 ENCOUNTER — HOSPITAL ENCOUNTER (OUTPATIENT)
Dept: INFUSION THERAPY | Age: 87
End: 2022-01-18

## 2022-11-08 NOTE — PROGRESS NOTES
[facility] Quality Flow/Interdisciplinary Rounds Progress Note        Quality Flow Rounds held on June 16, 2021    Disciplines Attending:  Bedside Nurse, ,  and Nursing Unit Leadership    Curits Arizmendi was admitted on 6/15/2021  9:02 AM    Anticipated Discharge Date:  Expected Discharge Date: 06/18/21    Disposition:    Josafat Score:  Josaaft Scale Score: 21    Readmission Risk              Risk of Unplanned Readmission:  12           Discussed patient goal for the day, patient clinical progression, and barriers to discharge.   The following Goal(s) of the Day/Commitment(s) have been identified:  improve respiratory status      Haider June RN  June 16, 2021 intact

## (undated) DEVICE — NEEDLE ASPIR 21GA L700MM US GUID TREAT DST END FOR EFFICIENT

## (undated) DEVICE — SURGICAL PROCEDURE PACK BRONCH

## (undated) DEVICE — SOLUTION IV IRRIG 500ML 0.9% SODIUM CHL 2F7123

## (undated) DEVICE — SINGLE USE SUCTION VALVE MAJ-209: Brand: SINGLE USE SUCTION VALVE (STERILE)

## (undated) DEVICE — Device: Brand: BALLOON

## (undated) DEVICE — Z DUP USE 2381766 KIT PROC W/ 190DEG FIRM TIP EXT WRK CHN LOCATABLE GUID FOR [SDK4190FT] [MEDTRONIC USA INC]

## (undated) DEVICE — SINGLE USE BIOPSY VALVE MAJ-210: Brand: SINGLE USE BIOPSY VALVE (STERILE)

## (undated) DEVICE — NEEDLE CYTO 21GA L137MM DIA1.9MM PULM BRAID TAPR SHTH OPT 5PK

## (undated) DEVICE — ADAPTER TBNG DIA15MM SWVL FBROPT BRONCHSCP TERM 2 AXIS PEEP

## (undated) DEVICE — ADAPTER BRONCHSCP FOR USE W/ OLY EDGE